# Patient Record
Sex: FEMALE | Race: WHITE | Employment: OTHER
[De-identification: names, ages, dates, MRNs, and addresses within clinical notes are randomized per-mention and may not be internally consistent; named-entity substitution may affect disease eponyms.]

---

## 2020-05-11 ENCOUNTER — NURSE TRIAGE (OUTPATIENT)
Dept: OTHER | Facility: CLINIC | Age: 65
End: 2020-05-11

## 2021-01-21 ENCOUNTER — NURSE TRIAGE (OUTPATIENT)
Dept: OTHER | Facility: CLINIC | Age: 66
End: 2021-01-21

## 2021-01-21 NOTE — TELEPHONE ENCOUNTER
cities for community spread in the [address]. Note: Travel becomes less relevant if there is widespread community transmission where the patient lives. no    Protocols used: CORONAVIRUS (COVID-19 ) EXPOSURE-ADULT-[place]    Brief description of triage: pt was exposed to covid positive co-worker who hadn't worn mask while working-- last exposure 2 days ago. 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 Triage indicates for patient to call PCP. Care advice provided, patient verbalizes understanding; denies any other questions or concerns; instructed to call back for any new or worsening symptoms. This triage is a result of a call to [address] [place] [address]. Please do not respond to the triage nurse through this encounter. Any subsequent communication should be directly with the patient.

## 2021-10-21 ENCOUNTER — NURSE TRIAGE (OUTPATIENT)
Dept: OTHER | Facility: CLINIC | Age: 66
End: 2021-10-21

## 2021-10-21 NOTE — TELEPHONE ENCOUNTER
Brief description of triage: is currently on vacation and not feeling good. Took a home covid test from [facility] and it showed positive. Asking if it is accurate. Does have a deep cough and is taking Mucinex. Also has a call in to her PCP. No known covid exposure and has been vaccinated. Triage indicates for patient to call back as needed. Care advice provided, patient verbalizes understanding; denies any other questions or concerns; instructed to call back for any new or worsening symptoms. This triage is a result of a call to [address] [place] [address]. Please do not respond to the triage nurse through this encounter. Any subsequent communication should be directly with the patient.         Reason for Disposition   General information question, no triage required and triager able to answer question    Protocols used: INFORMATION ONLY CALL - NO TRIAGE-ADULT-

## 2021-10-24 ENCOUNTER — NURSE TRIAGE (OUTPATIENT)
Dept: OTHER | Facility: CLINIC | Age: 66
End: 2021-10-24

## 2021-10-24 NOTE — TELEPHONE ENCOUNTER
taste, muscle pain, sore throat; new loss of smell or taste especially support the diagnosis of COVID-19)            Fatigue and cough, body aches a little bit    Protocols used: CORONAVIRUS (COVID-19) DIAGNOSED OR SUSPECTED-ADULT-    Brief description of triage: see above     Triage indicates for patient to see provider within the next 24 hrs for persistent cough with congestion and positive for COVID     Care advice provided, patient verbalizes understanding; denies any other questions or concerns; instructed to call back for any new or worsening symptoms. This triage is a result of a call to [address] [place] [address]. Please do not respond to the triage nurse through this encounter. Any subsequent communication should be directly with the patient.

## 2023-02-06 PROBLEM — E77.8 HYPOPROTEINEMIA (MULTI): Status: ACTIVE | Noted: 2023-02-06

## 2023-02-06 PROBLEM — R71.8 MICROCYTOSIS: Status: ACTIVE | Noted: 2023-02-06

## 2023-02-06 PROBLEM — R51.9 HEADACHE: Status: ACTIVE | Noted: 2023-02-06

## 2023-02-06 PROBLEM — F41.1 ANXIETY STATE: Status: ACTIVE | Noted: 2023-02-06

## 2023-02-06 PROBLEM — M19.071 PRIMARY OSTEOARTHRITIS OF BOTH ANKLES: Status: ACTIVE | Noted: 2023-02-06

## 2023-02-06 PROBLEM — G89.29 CHRONIC RIGHT FLANK PAIN: Status: ACTIVE | Noted: 2023-02-06

## 2023-02-06 PROBLEM — M65.331 TRIGGER MIDDLE FINGER OF RIGHT HAND: Status: ACTIVE | Noted: 2023-02-06

## 2023-02-06 PROBLEM — K52.9 CHRONIC DIARRHEA: Status: ACTIVE | Noted: 2023-02-06

## 2023-02-06 PROBLEM — M19.072 PRIMARY OSTEOARTHRITIS OF BOTH ANKLES: Status: ACTIVE | Noted: 2023-02-06

## 2023-02-06 PROBLEM — R14.0 ABDOMINAL BLOATING: Status: ACTIVE | Noted: 2023-02-06

## 2023-02-06 PROBLEM — R10.9 CHRONIC RIGHT FLANK PAIN: Status: ACTIVE | Noted: 2023-02-06

## 2023-02-06 PROBLEM — M25.641 STIFFNESS OF FINGER JOINT OF RIGHT HAND: Status: ACTIVE | Noted: 2023-02-06

## 2023-02-06 PROBLEM — Z90.49 S/P APPENDECTOMY: Status: ACTIVE | Noted: 2023-02-06

## 2023-02-06 PROBLEM — R20.0 NUMBNESS IN FEET: Status: ACTIVE | Noted: 2023-02-06

## 2023-02-06 PROBLEM — K44.9 HIATAL HERNIA: Status: ACTIVE | Noted: 2023-02-06

## 2023-02-06 PROBLEM — D50.9 IRON DEFICIENCY ANEMIA: Status: ACTIVE | Noted: 2023-02-06

## 2023-02-06 PROBLEM — K76.0 FATTY LIVER: Status: ACTIVE | Noted: 2023-02-06

## 2023-02-06 PROBLEM — K58.0 IRRITABLE BOWEL SYNDROME WITH DIARRHEA: Status: ACTIVE | Noted: 2023-02-06

## 2023-02-06 PROBLEM — I10 BENIGN ESSENTIAL HTN: Status: ACTIVE | Noted: 2023-02-06

## 2023-02-06 PROBLEM — G57.81 OTHER SPECIFIED MONONEUROPATHIES OF RIGHT LOWER LIMB: Status: ACTIVE | Noted: 2023-02-06

## 2023-02-06 PROBLEM — K29.50 CHRONIC GASTRITIS: Status: ACTIVE | Noted: 2023-02-06

## 2023-02-06 PROBLEM — Z86.16 HISTORY OF COVID-19: Status: ACTIVE | Noted: 2023-02-06

## 2023-02-06 PROBLEM — J45.909 ASTHMA (HHS-HCC): Status: ACTIVE | Noted: 2023-02-06

## 2023-02-06 PROBLEM — R60.0 LEG EDEMA: Status: ACTIVE | Noted: 2023-02-06

## 2023-02-06 PROBLEM — E04.1 THYROID NODULE: Status: ACTIVE | Noted: 2023-02-06

## 2023-02-06 PROBLEM — M79.671 PAIN IN BOTH FEET: Status: ACTIVE | Noted: 2023-02-06

## 2023-02-06 PROBLEM — K21.9 GERD (GASTROESOPHAGEAL REFLUX DISEASE): Status: ACTIVE | Noted: 2023-02-06

## 2023-02-06 PROBLEM — K92.1: Status: ACTIVE | Noted: 2023-02-06

## 2023-02-06 PROBLEM — Z98.84 BARIATRIC SURGERY STATUS: Status: ACTIVE | Noted: 2023-02-06

## 2023-02-06 PROBLEM — E74.39 GLUCOSE INTOLERANCE: Status: ACTIVE | Noted: 2023-02-06

## 2023-02-06 PROBLEM — D64.9 ANEMIA: Status: ACTIVE | Noted: 2023-02-06

## 2023-02-06 PROBLEM — R53.83 FATIGUE: Status: ACTIVE | Noted: 2023-02-06

## 2023-02-06 PROBLEM — Z86.39 H/O HYPOGLYCEMIA: Status: ACTIVE | Noted: 2023-02-06

## 2023-02-06 PROBLEM — H04.209 EYE TEARING: Status: ACTIVE | Noted: 2023-02-06

## 2023-02-06 PROBLEM — R63.4 WEIGHT LOSS: Status: ACTIVE | Noted: 2023-02-06

## 2023-02-06 PROBLEM — E78.5 HYPERLIPIDEMIA: Status: ACTIVE | Noted: 2023-02-06

## 2023-02-06 PROBLEM — M79.672 PAIN IN BOTH FEET: Status: ACTIVE | Noted: 2023-02-06

## 2023-02-06 RX ORDER — OMEPRAZOLE 40 MG/1
40 CAPSULE, DELAYED RELEASE ORAL
COMMUNITY
Start: 2021-08-03 | End: 2023-12-07 | Stop reason: SDUPTHER

## 2023-02-06 RX ORDER — FAMOTIDINE 20 MG/1
20 TABLET, FILM COATED ORAL 2 TIMES DAILY PRN
COMMUNITY
Start: 2018-09-05 | End: 2023-12-07 | Stop reason: SDUPTHER

## 2023-02-06 RX ORDER — BLOOD-GLUCOSE METER
EACH MISCELLANEOUS
COMMUNITY
Start: 2019-12-02

## 2023-02-06 RX ORDER — SUCRALFATE 1 G/1
1 TABLET ORAL
COMMUNITY
Start: 2022-09-01 | End: 2023-03-13

## 2023-02-06 RX ORDER — UBIDECARENONE 75 MG
500 CAPSULE ORAL DAILY
COMMUNITY

## 2023-02-06 RX ORDER — METOPROLOL TARTRATE 25 MG/1
25 TABLET, FILM COATED ORAL 2 TIMES DAILY
COMMUNITY
Start: 2018-02-16 | End: 2023-09-18

## 2023-02-06 RX ORDER — FEXOFENADINE HCL 60 MG
30 TABLET ORAL
COMMUNITY
End: 2023-03-13

## 2023-02-06 RX ORDER — MULTIVITAMIN
1 TABLET ORAL DAILY
COMMUNITY

## 2023-02-06 RX ORDER — OLOPATADINE HYDROCHLORIDE 2 MG/ML
1 SOLUTION/ DROPS OPHTHALMIC
COMMUNITY
Start: 2022-02-10 | End: 2023-10-05

## 2023-03-13 ENCOUNTER — OFFICE VISIT (OUTPATIENT)
Dept: PRIMARY CARE | Facility: CLINIC | Age: 68
End: 2023-03-13
Payer: MEDICARE

## 2023-03-13 VITALS
WEIGHT: 176 LBS | DIASTOLIC BLOOD PRESSURE: 76 MMHG | HEART RATE: 70 BPM | OXYGEN SATURATION: 97 % | HEIGHT: 63 IN | SYSTOLIC BLOOD PRESSURE: 118 MMHG | BODY MASS INDEX: 31.18 KG/M2

## 2023-03-13 DIAGNOSIS — R20.0 NUMBNESS AND TINGLING OF BOTH FEET: ICD-10-CM

## 2023-03-13 DIAGNOSIS — K92.1: ICD-10-CM

## 2023-03-13 DIAGNOSIS — R20.2 NUMBNESS AND TINGLING OF BOTH FEET: ICD-10-CM

## 2023-03-13 DIAGNOSIS — K59.00 CONSTIPATION, UNSPECIFIED CONSTIPATION TYPE: ICD-10-CM

## 2023-03-13 DIAGNOSIS — I10 BENIGN ESSENTIAL HTN: ICD-10-CM

## 2023-03-13 DIAGNOSIS — K29.50 OTHER CHRONIC GASTRITIS WITHOUT HEMORRHAGE: ICD-10-CM

## 2023-03-13 DIAGNOSIS — R14.0 ABDOMINAL BLOATING: Primary | ICD-10-CM

## 2023-03-13 DIAGNOSIS — K64.8 INTERNAL HEMORRHOID: ICD-10-CM

## 2023-03-13 DIAGNOSIS — K58.2 IRRITABLE BOWEL SYNDROME WITH BOTH CONSTIPATION AND DIARRHEA: ICD-10-CM

## 2023-03-13 PROBLEM — K52.9 CHRONIC DIARRHEA: Status: RESOLVED | Noted: 2023-02-06 | Resolved: 2023-03-13

## 2023-03-13 PROBLEM — E77.8 HYPOPROTEINEMIA (MULTI): Status: RESOLVED | Noted: 2023-02-06 | Resolved: 2023-03-13

## 2023-03-13 PROBLEM — K58.0 IRRITABLE BOWEL SYNDROME WITH DIARRHEA: Status: RESOLVED | Noted: 2023-02-06 | Resolved: 2023-03-13

## 2023-03-13 PROCEDURE — 99214 OFFICE O/P EST MOD 30 MIN: CPT | Performed by: INTERNAL MEDICINE

## 2023-03-13 PROCEDURE — 3074F SYST BP LT 130 MM HG: CPT | Performed by: INTERNAL MEDICINE

## 2023-03-13 PROCEDURE — 1159F MED LIST DOCD IN RCRD: CPT | Performed by: INTERNAL MEDICINE

## 2023-03-13 PROCEDURE — 1160F RVW MEDS BY RX/DR IN RCRD: CPT | Performed by: INTERNAL MEDICINE

## 2023-03-13 PROCEDURE — 3078F DIAST BP <80 MM HG: CPT | Performed by: INTERNAL MEDICINE

## 2023-03-13 PROCEDURE — 1036F TOBACCO NON-USER: CPT | Performed by: INTERNAL MEDICINE

## 2023-03-13 RX ORDER — ASPIRIN 81 MG/1
1 TABLET ORAL DAILY
COMMUNITY
End: 2023-03-13

## 2023-03-13 RX ORDER — DOCUSATE SODIUM 100 MG/1
100 CAPSULE, LIQUID FILLED ORAL 2 TIMES DAILY PRN
Qty: 60 CAPSULE | Refills: 5 | Status: SHIPPED
Start: 2023-03-13 | End: 2023-03-13 | Stop reason: SDUPTHER

## 2023-03-13 RX ORDER — TRIAMCINOLONE ACETONIDE 1 MG/G
CREAM TOPICAL 2 TIMES DAILY
COMMUNITY
Start: 2023-02-09 | End: 2023-10-05

## 2023-03-13 RX ORDER — DOCUSATE SODIUM 100 MG/1
100 CAPSULE, LIQUID FILLED ORAL ONCE
Status: SHIPPED | OUTPATIENT
Start: 2023-03-13

## 2023-03-13 RX ORDER — AMLODIPINE BESYLATE 5 MG/1
5 TABLET ORAL DAILY
COMMUNITY
Start: 2021-01-05 | End: 2023-12-07 | Stop reason: SDUPTHER

## 2023-03-13 RX ORDER — HYDROCORTISONE ACETATE 25 MG/1
25 SUPPOSITORY RECTAL 2 TIMES DAILY
COMMUNITY
Start: 2023-03-11 | End: 2023-10-05

## 2023-03-13 RX ORDER — MINERAL OIL
180 ENEMA (ML) RECTAL DAILY
COMMUNITY
End: 2023-12-07 | Stop reason: SDUPTHER

## 2023-03-13 ASSESSMENT — ENCOUNTER SYMPTOMS
ABDOMINAL DISTENTION: 0
MUSCULOSKELETAL NEGATIVE: 1
NAUSEA: 0
CHILLS: 0
DIZZINESS: 0
RHINORRHEA: 0
DIARRHEA: 0
LIGHT-HEADEDNESS: 0
PSYCHIATRIC NEGATIVE: 1
DYSURIA: 0
EYES NEGATIVE: 1
HEADACHES: 0
VOMITING: 0
BLOOD IN STOOL: 1
AGITATION: 0
WEAKNESS: 0
WHEEZING: 0
ENDOCRINE NEGATIVE: 1
FEVER: 0
NUMBNESS: 1
SHORTNESS OF BREATH: 0
CARDIOVASCULAR NEGATIVE: 1
CONSTIPATION: 1
COUGH: 0
ABDOMINAL PAIN: 0
BACK PAIN: 0
PALPITATIONS: 0

## 2023-03-13 ASSESSMENT — PATIENT HEALTH QUESTIONNAIRE - PHQ9
2. FEELING DOWN, DEPRESSED OR HOPELESS: NOT AT ALL
1. LITTLE INTEREST OR PLEASURE IN DOING THINGS: NOT AT ALL
SUM OF ALL RESPONSES TO PHQ9 QUESTIONS 1 AND 2: 0

## 2023-03-13 NOTE — PROGRESS NOTES
Subjective   Patient ID: Yolette Morrow is a 67 y.o. female who presents for Follow-up (ER FOLLOW UP FOR RECTAL BLEEDING (HAS INTERNAL HEMORRHOIDS) PT WAS SEEN AT [facility] IN [place]  HPI  F/U AFTER ER VISIT AT [place] (HAPPENED DURING TRIP) FOR ABDOMINAL PAIN AND ALECIA BRIGHT RED BLOOD IN STOOL, CONSTIPATION. HAD LABS AND CT OF ABDOMEN AND PELVIS DONE. CURRENTLY IS USING RECTAL STEROID WHICH HELPS WITH HAVING INTERNAL HEMORRHOID. STILL HAS BLOOD IN STOOL, BUT IS SPOTTING. ABDOMINAL PAIN IS RESOLVED. ABDOMINAL BLOATING. WORSENING CHRONIC TINGLING AND NUMBNESS IF FEET . S/P B/L CARPAL TUNNEL SURGERY AROUND 3 YEARS AGO.  Review of Systems   Constitutional:  Negative for chills and fever.   HENT: Negative.  Negative for congestion, postnasal drip and rhinorrhea.    Eyes: Negative.  Negative for visual disturbance.   Respiratory:  Negative for cough, shortness of breath and wheezing.    Cardiovascular: Negative.  Negative for chest pain, palpitations and leg swelling.   Gastrointestinal:  Positive for blood in stool and constipation. Negative for abdominal distention, abdominal pain, diarrhea, nausea and vomiting.   Endocrine: Negative.    Genitourinary:  Negative for dysuria and urgency.   Musculoskeletal: Negative.  Negative for back pain.   Skin: Negative.  Negative for rash.   Allergic/Immunologic: Negative for immunocompromised state.   Neurological:  Positive for numbness. Negative for dizziness, weakness, light-headedness and headaches.        TINGLING OF FEET   Psychiatric/Behavioral: Negative.  Negative for agitation.        Objective   Physical Exam  Constitutional:       General: She is not in acute distress.  HENT:      Head: Normocephalic.      Nose: Nose normal.      Mouth/Throat:      Mouth: Mucous membranes are moist.   Eyes:      Conjunctiva/sclera: Conjunctivae normal.      Pupils: Pupils are equal, round, and reactive to light.   Cardiovascular:      Rate and Rhythm: Normal rate  and regular rhythm.      Pulses: Normal pulses.      Heart sounds: Normal heart sounds.   Pulmonary:      Effort: No respiratory distress.      Breath sounds: No wheezing.   Chest:      Chest wall: No tenderness.   Abdominal:      General: Abdomen is flat. Bowel sounds are normal.      Palpations: Abdomen is soft.      Tenderness: There is no abdominal tenderness.   Musculoskeletal:         General: No tenderness. Normal range of motion.      Cervical back: Normal range of motion.   Lymphadenopathy:      Cervical: No cervical adenopathy.   Skin:     General: Skin is warm and dry.      Findings: No rash.   Neurological:      General: No focal deficit present.      Mental Status: She is alert. Mental status is at baseline.   Psychiatric:         Mood and Affect: Mood normal.         Behavior: Behavior normal.         Assessment/Plan      Problem List Items Addressed This Visit          Circulatory    Benign essential HTN       Digestive    Chronic gastritis       Other    Abdominal bloating - Primary    Blood in stool, mehul     Other Visit Diagnoses       Constipation, unspecified constipation type        Relevant Medications    docusate sodium (Colace) capsule 100 mg    docusate sodium (Colace) 100 mg capsule    Numbness and tingling of both feet        Relevant Orders    EMG & nerve conduction    Internal hemorrhoid        Irritable bowel syndrome with both constipation and diarrhea            ALL ASSESSED CHRONIC CONDITIONS ARE STABLE OR ADDRESSED.  TEST RESULTS WERE DISCUSSED.  Stop smoking and chewing tobacco.  Lose weight.  Do not overeat. Eat small portions at meals and snacks.  Avoid tight clothing and tight-fitting belts. Do not lie down or bend over within the first 15-30 minutes after eating.  Do not chew gum or suck on hard candy. Swallowing air with chewing gum and sucking on hard candy can cause belching and reflux.  Raise the head of your bed 6-8 inches.  Do not eat/drink: chocolate, tomatoes, tomato  sauces, oranges, pineapple, grapefruit, mints, coffee, alcohol, carbonated beverages, and black pepper.  Eat a low fat diet. Fatty and greasy foods cause your stomach to produce more acid.  ADVISED TO ADD MORE FIBER TO DIET.    MDM    1) COMPLEXITY: 1 OR MORE CHRONIC CONDITION WITH EXACERBATION, OR PROGRESSION OR SIDE EFFECT OF TREATMENT ADDRESSED  2)DATA: TESTS INTERPRETED AND OR ORDERED, TOOK INDEPENDENT HISTORY OR RECORDS REVIEWED  3)RISK: MODERATE RISK DUE TO NATURE OF MEDICAL CONDITIONS/COMORBIDITY OR MEDICATIONS ORDERED OR SURGICAL OR PROCEDURE REFERRAL, .

## 2023-03-14 RX ORDER — DOCUSATE SODIUM 100 MG/1
100 CAPSULE, LIQUID FILLED ORAL 2 TIMES DAILY PRN
Qty: 60 CAPSULE | Refills: 5 | Status: SHIPPED | OUTPATIENT
Start: 2023-03-14 | End: 2023-04-06 | Stop reason: ALTCHOICE

## 2023-03-30 ENCOUNTER — APPOINTMENT (OUTPATIENT)
Dept: PRIMARY CARE | Facility: CLINIC | Age: 68
End: 2023-03-30
Payer: MEDICARE

## 2023-04-06 ENCOUNTER — OFFICE VISIT (OUTPATIENT)
Dept: PRIMARY CARE | Facility: CLINIC | Age: 68
End: 2023-04-06
Payer: MEDICARE

## 2023-04-06 VITALS
OXYGEN SATURATION: 98 % | BODY MASS INDEX: 31.18 KG/M2 | WEIGHT: 176 LBS | SYSTOLIC BLOOD PRESSURE: 112 MMHG | HEART RATE: 73 BPM | HEIGHT: 63 IN | DIASTOLIC BLOOD PRESSURE: 68 MMHG

## 2023-04-06 DIAGNOSIS — R14.0 ABDOMINAL BLOATING: ICD-10-CM

## 2023-04-06 DIAGNOSIS — K29.50 CHRONIC GASTRITIS WITHOUT BLEEDING, UNSPECIFIED GASTRITIS TYPE: ICD-10-CM

## 2023-04-06 DIAGNOSIS — I10 BENIGN ESSENTIAL HTN: ICD-10-CM

## 2023-04-06 DIAGNOSIS — R53.82 CHRONIC FATIGUE: ICD-10-CM

## 2023-04-06 DIAGNOSIS — Z98.84 BARIATRIC SURGERY STATUS: ICD-10-CM

## 2023-04-06 DIAGNOSIS — K52.9 CHRONIC DIARRHEA: Primary | ICD-10-CM

## 2023-04-06 PROBLEM — H04.209 EYE TEARING: Status: RESOLVED | Noted: 2023-02-06 | Resolved: 2023-04-06

## 2023-04-06 PROBLEM — K92.1: Status: RESOLVED | Noted: 2023-02-06 | Resolved: 2023-04-06

## 2023-04-06 PROCEDURE — 1159F MED LIST DOCD IN RCRD: CPT | Performed by: INTERNAL MEDICINE

## 2023-04-06 PROCEDURE — 1160F RVW MEDS BY RX/DR IN RCRD: CPT | Performed by: INTERNAL MEDICINE

## 2023-04-06 PROCEDURE — 1036F TOBACCO NON-USER: CPT | Performed by: INTERNAL MEDICINE

## 2023-04-06 PROCEDURE — 3074F SYST BP LT 130 MM HG: CPT | Performed by: INTERNAL MEDICINE

## 2023-04-06 PROCEDURE — 3078F DIAST BP <80 MM HG: CPT | Performed by: INTERNAL MEDICINE

## 2023-04-06 PROCEDURE — 99214 OFFICE O/P EST MOD 30 MIN: CPT | Performed by: INTERNAL MEDICINE

## 2023-04-06 ASSESSMENT — ENCOUNTER SYMPTOMS
WHEEZING: 0
BACK PAIN: 0
FEVER: 0
CHILLS: 0
PSYCHIATRIC NEGATIVE: 1
SHORTNESS OF BREATH: 0
NEUROLOGICAL NEGATIVE: 1
ABDOMINAL PAIN: 0
DIARRHEA: 1
ENDOCRINE NEGATIVE: 1
ABDOMINAL DISTENTION: 0
MUSCULOSKELETAL NEGATIVE: 1
DYSURIA: 0
HEADACHES: 0
CARDIOVASCULAR NEGATIVE: 1
NAUSEA: 0
CONSTIPATION: 0
EYES NEGATIVE: 1
AGITATION: 0
DIZZINESS: 0
VOMITING: 0
RHINORRHEA: 0
WEAKNESS: 0
PALPITATIONS: 0
COUGH: 0
LIGHT-HEADEDNESS: 0

## 2023-04-06 NOTE — PROGRESS NOTES
Subjective   Patient ID: Yolette Morrow is a 67 y.o. female who presents for Follow-up (3 WEEK FOLLOW UP- PREDNISONE COMPLETE. STILL HAVING DIARRHEA AFTER EATING. SEES DIGESTIVE DR NEXT WEEK.).  HPI  CHRONIC DIARRHEA ,F/U ON HTN . ALTERNATIONS FROM NORMAL STOOL TO LIQUID) ON A DAILY BASIS. FATIGUE, ABDOMINAL BLOATING .HAS BEEN FOLLOWING DAIRY FREE ,GLUTEN FREE DIET. DENIES HAVING ABDOMINAL PAIN ,EXCEPT MILD PAIN 2/10 OF RUQ AND LUQ . PRN ANTICHOLINERGIC CLASS DIDN'T HELP. PENDING LABS FOR UPCOMING GI VISIT. PENDING FOOD ALLERGY TEST IN 1 MONTH.  Review of Systems   Constitutional:  Negative for chills and fever.   HENT: Negative.  Negative for congestion, postnasal drip and rhinorrhea.    Eyes: Negative.  Negative for visual disturbance.   Respiratory:  Negative for cough, shortness of breath and wheezing.    Cardiovascular: Negative.  Negative for chest pain, palpitations and leg swelling.   Gastrointestinal:  Positive for diarrhea. Negative for abdominal distention, abdominal pain, constipation, nausea and vomiting.   Endocrine: Negative.    Genitourinary:  Negative for dysuria and urgency.   Musculoskeletal: Negative.  Negative for back pain.   Skin: Negative.  Negative for rash.   Allergic/Immunologic: Negative for immunocompromised state.   Neurological: Negative.  Negative for dizziness, weakness, light-headedness and headaches.   Psychiatric/Behavioral: Negative.  Negative for agitation.        Objective   Physical Exam  Constitutional:       General: She is not in acute distress.  HENT:      Head: Normocephalic.      Nose: Nose normal.      Mouth/Throat:      Mouth: Mucous membranes are moist.   Eyes:      Conjunctiva/sclera: Conjunctivae normal.      Pupils: Pupils are equal, round, and reactive to light.   Cardiovascular:      Rate and Rhythm: Normal rate and regular rhythm.      Pulses: Normal pulses.      Heart sounds: Normal heart sounds.   Pulmonary:      Effort: No respiratory distress.      Breath  sounds: No wheezing.   Chest:      Chest wall: No tenderness.   Abdominal:      General: Abdomen is flat. Bowel sounds are normal.      Palpations: Abdomen is soft.      Tenderness: There is abdominal tenderness.      Comments: RUQ AND LUQ TENDERNESS (AT THE FAR CORNERS OF ABDOMEN, ON BOTH SIDES) .   Musculoskeletal:         General: No tenderness. Normal range of motion.      Cervical back: Normal range of motion.   Lymphadenopathy:      Cervical: No cervical adenopathy.   Skin:     General: Skin is warm and dry.      Findings: No rash.   Neurological:      General: No focal deficit present.      Mental Status: She is alert. Mental status is at baseline.   Psychiatric:         Mood and Affect: Mood normal.         Behavior: Behavior normal.         Assessment/Plan   Problem List Items Addressed This Visit          Circulatory    Benign essential HTN       Digestive    Bariatric surgery status    Chronic diarrhea - Primary    Chronic gastritis       Other    Abdominal bloating     ADVISED FOR SMALLER MEAL PORTIONS MORE FREQUENT SERVINGS AND TO HAVE MORE GATORADE ,AND TO HAVE LOW FAT DIET. CUT BACK ON CAFFEINE.  Lose weight.  Do not overeat. Eat small portions at meals and snacks.  Avoid tight clothing and tight-fitting belts. Do not lie down or bend over within the first 15-30 minutes after eating.  Do not chew gum or suck on hard candy. Swallowing air with chewing gum and sucking on hard candy can cause belching and reflux.  Raise the head of your bed 6-8 inches.  Do not eat/drink: chocolate, tomatoes, tomato sauces, oranges, pineapple, grapefruit, mints, coffee, alcohol, carbonated beverages, and black pepper.  Eat a low fat diet. Fatty and greasy foods cause your stomach to produce more acid.    MDM    1) COMPLEXITY: MORE THAN 1 STABLE CHRONIC CONDITION ADDRESSED  2)DATA: TESTS INTERPRETED AND OR ORDERED, TOOK INDEPENDENT HISTORY OR RECORDS REVIEWED  3)RISK: MODERATE RISK DUE TO NATURE OF MEDICAL  CONDITIONS/COMORBIDITY OR MEDICATIONS ORDERED OR SURGICAL OR PROCEDURE REFERRAL, .       3 weeks

## 2023-04-07 LAB
CALCIDIOL (25 OH VITAMIN D3) (NG/ML) IN SER/PLAS: 39 NG/ML
COBALAMIN (VITAMIN B12) (PG/ML) IN SER/PLAS: 3044 PG/ML (ref 211–911)
FOLATE (NG/ML) IN SER/PLAS: >22.3 NG/ML
PARATHYRIN INTACT (PG/ML) IN SER/PLAS: 51 PG/ML (ref 18.5–88)

## 2023-04-12 LAB — VITAMIN B1, WHOLE BLOOD: 118 NMOL/L (ref 70–180)

## 2023-04-13 LAB
ALANINE AMINOTRANSFERASE (SGPT) (U/L) IN SER/PLAS: 14 U/L (ref 7–45)
ALBUMIN (G/DL) IN SER/PLAS: 4.4 G/DL (ref 3.4–5)
ALKALINE PHOSPHATASE (U/L) IN SER/PLAS: 68 U/L (ref 33–136)
ANION GAP IN SER/PLAS: 11 MMOL/L (ref 10–20)
ASPARTATE AMINOTRANSFERASE (SGOT) (U/L) IN SER/PLAS: 20 U/L (ref 9–39)
BASOPHILS (10*3/UL) IN BLOOD BY AUTOMATED COUNT: 0.07 X10E9/L (ref 0–0.1)
BASOPHILS/100 LEUKOCYTES IN BLOOD BY AUTOMATED COUNT: 1 % (ref 0–2)
BILIRUBIN TOTAL (MG/DL) IN SER/PLAS: 0.5 MG/DL (ref 0–1.2)
CALCIDIOL (25 OH VITAMIN D3) (NG/ML) IN SER/PLAS: 39 NG/ML
CALCIUM (MG/DL) IN SER/PLAS: 9.3 MG/DL (ref 8.6–10.3)
CARBON DIOXIDE, TOTAL (MMOL/L) IN SER/PLAS: 26 MMOL/L (ref 21–32)
CHLORIDE (MMOL/L) IN SER/PLAS: 105 MMOL/L (ref 98–107)
CHOLESTEROL (MG/DL) IN SER/PLAS: 188 MG/DL (ref 0–199)
CHOLESTEROL IN HDL (MG/DL) IN SER/PLAS: 47 MG/DL
CHOLESTEROL/HDL RATIO: 4
COBALAMIN (VITAMIN B12) (PG/ML) IN SER/PLAS: 2740 PG/ML (ref 211–911)
CREATININE (MG/DL) IN SER/PLAS: 0.65 MG/DL (ref 0.5–1.05)
EOSINOPHILS (10*3/UL) IN BLOOD BY AUTOMATED COUNT: 0.19 X10E9/L (ref 0–0.7)
EOSINOPHILS/100 LEUKOCYTES IN BLOOD BY AUTOMATED COUNT: 2.7 % (ref 0–6)
ERYTHROCYTE DISTRIBUTION WIDTH (RATIO) BY AUTOMATED COUNT: 12.9 % (ref 11.5–14.5)
ERYTHROCYTE MEAN CORPUSCULAR HEMOGLOBIN CONCENTRATION (G/DL) BY AUTOMATED: 32.7 G/DL (ref 32–36)
ERYTHROCYTE MEAN CORPUSCULAR VOLUME (FL) BY AUTOMATED COUNT: 91 FL (ref 80–100)
ERYTHROCYTES (10*6/UL) IN BLOOD BY AUTOMATED COUNT: 4.53 X10E12/L (ref 4–5.2)
ESTIMATED AVERAGE GLUCOSE FOR HBA1C: 120 MG/DL
FERRITIN (UG/LL) IN SER/PLAS: 21 UG/L (ref 8–150)
FOLATE (NG/ML) IN SER/PLAS: >22.3 NG/ML
GFR FEMALE: >90 ML/MIN/1.73M2
GLUCOSE (MG/DL) IN SER/PLAS: 83 MG/DL (ref 74–99)
HEMATOCRIT (%) IN BLOOD BY AUTOMATED COUNT: 41.3 % (ref 36–46)
HEMOGLOBIN (G/DL) IN BLOOD: 13.5 G/DL (ref 12–16)
HEMOGLOBIN A1C/HEMOGLOBIN TOTAL IN BLOOD: 5.8 %
IMMATURE GRANULOCYTES/100 LEUKOCYTES IN BLOOD BY AUTOMATED COUNT: 0.6 % (ref 0–0.9)
IRON (UG/DL) IN SER/PLAS: 120 UG/DL (ref 35–150)
IRON BINDING CAPACITY (UG/DL) IN SER/PLAS: 380 UG/DL (ref 240–445)
IRON SATURATION (%) IN SER/PLAS: 32 % (ref 25–45)
LDL: 110 MG/DL (ref 0–99)
LEUKOCYTES (10*3/UL) IN BLOOD BY AUTOMATED COUNT: 7 X10E9/L (ref 4.4–11.3)
LYMPHOCYTES (10*3/UL) IN BLOOD BY AUTOMATED COUNT: 2.61 X10E9/L (ref 1.2–4.8)
LYMPHOCYTES/100 LEUKOCYTES IN BLOOD BY AUTOMATED COUNT: 37.3 % (ref 13–44)
MONOCYTES (10*3/UL) IN BLOOD BY AUTOMATED COUNT: 0.7 X10E9/L (ref 0.1–1)
MONOCYTES/100 LEUKOCYTES IN BLOOD BY AUTOMATED COUNT: 10 % (ref 2–10)
NEUTROPHILS (10*3/UL) IN BLOOD BY AUTOMATED COUNT: 3.38 X10E9/L (ref 1.2–7.7)
NEUTROPHILS/100 LEUKOCYTES IN BLOOD BY AUTOMATED COUNT: 48.4 % (ref 40–80)
PLATELETS (10*3/UL) IN BLOOD AUTOMATED COUNT: 257 X10E9/L (ref 150–450)
POTASSIUM (MMOL/L) IN SER/PLAS: 3.9 MMOL/L (ref 3.5–5.3)
PROTEIN TOTAL: 6.9 G/DL (ref 6.4–8.2)
SODIUM (MMOL/L) IN SER/PLAS: 138 MMOL/L (ref 136–145)
TRIGLYCERIDE (MG/DL) IN SER/PLAS: 157 MG/DL (ref 0–149)
UREA NITROGEN (MG/DL) IN SER/PLAS: 16 MG/DL (ref 6–23)
VLDL: 31 MG/DL (ref 0–40)

## 2023-04-14 LAB
ALLERGEN FOOD: CLAM (RUDITAPES SPP.) IGE (KU/L): <0.1 KU/L
ALLERGEN FOOD: EGG WHITE IGE (KU/L): <0.1 KU/L
ALLERGEN FOOD: FISH (COD) GADUS MORHUA) IGE (KU/L): <0.1 KU/L
ALLERGEN FOOD: MAIZE, CORN (ZEA MAYS) IGE (KU/L): <0.1 KU/L
ALLERGEN FOOD: MILK IGE (KU/L): <0.1 KU/L
ALLERGEN FOOD: PEANUT (ARACHIS HYPOGAEA) IGE (KU/L): <0.1 KU/L
ALLERGEN FOOD: SCALLOP (PECTEN SPP.) IGE (KU/L): <0.1 KU/L
ALLERGEN FOOD: SESAME SEED (SESAMUM INDICUM) IGE (KU/L): <0.1 KU/L
ALLERGEN FOOD: SHRIMP (P. BOREALIS/MONODON, M. BARBATA/JOYNERI) IGE (KU/L): <0.1 KU/L
ALLERGEN FOOD: SOYBEAN (GLYCINE MAX) IGE (KU/L): <0.1 KU/L
ALLERGEN FOOD: WALNUT (JUGLANS SPP.) IGE (KU/L): <0.1 KU/L
ALLERGEN FOOD: WHEAT (TRITICUM AESTIVUM) IGE (KU/L): <0.1 KU/L
IMMUNOCAP INTERPRETATION: NORMAL
PARATHYRIN INTACT (PG/ML) IN SER/PLAS: 38.7 PG/ML (ref 18.5–88)

## 2023-04-17 LAB — COPPER: 145.7 UG/DL (ref 80–155)

## 2023-04-18 LAB — VITAMIN B1, WHOLE BLOOD: 136 NMOL/L (ref 70–180)

## 2023-04-20 ENCOUNTER — APPOINTMENT (OUTPATIENT)
Dept: PRIMARY CARE | Facility: CLINIC | Age: 68
End: 2023-04-20
Payer: MEDICARE

## 2023-04-24 ENCOUNTER — PATIENT OUTREACH (OUTPATIENT)
Dept: CARE COORDINATION | Facility: CLINIC | Age: 68
End: 2023-04-24
Payer: MEDICARE

## 2023-04-24 ENCOUNTER — DOCUMENTATION (OUTPATIENT)
Dept: PRIMARY CARE | Facility: CLINIC | Age: 68
End: 2023-04-24
Payer: MEDICARE

## 2023-04-24 RX ORDER — DOCUSATE SODIUM 100 MG/1
100 CAPSULE, LIQUID FILLED ORAL DAILY
COMMUNITY
End: 2023-10-05

## 2023-04-24 RX ORDER — OXYCODONE HYDROCHLORIDE 5 MG/1
5 CAPSULE ORAL EVERY 4 HOURS PRN
COMMUNITY
End: 2023-10-05

## 2023-04-24 NOTE — PROGRESS NOTES
Discharge Facility: [facility]  Discharge Diagnosis: Chronic cholecysitis  Admission Date: 4/20/2023  Discharge Date: 4/21/2023    PCP Appointment Date: Pt states she has scheduled within 2 weeks, unable to verify in book it. Will reach out to office pool to follow up.  Specialist Appointment Date: Not scheduled at this time  Hospital Encounter and Summary: Linked   See discharge assessment below for further details    Engagement  Call Start Time: 1012 (4/24/2023 10:13 AM)    Medications  Medications reviewed with patient/caregiver?: Yes (new prescriptions reviewed) (4/24/2023 10:13 AM)  Is the patient having any side effects they believe may be caused by any medication additions or changes?: No (pt did report some constipation, pt states she has issues with constipation to begin with so she did skip some pain medication doses and took ducolax and was able to have a BM.) (4/24/2023 10:13 AM)  Does the patient have all medications ordered at discharge?: Yes (4/24/2023 10:13 AM)  Care Management Interventions: No intervention needed (4/24/2023 10:13 AM)  Is the patient taking all medications as directed (includes completed medication regime)?: Yes (4/24/2023 10:13 AM)  Care Management Interventions: Provided patient education (4/24/2023 10:13 AM)    Appointments  Does the patient have a primary care provider?: Yes (4/24/2023 10:13 AM)  Care Management Interventions: Educated patient on importance of making appointment (4/24/2023 10:13 AM)  Has the patient kept scheduled appointments due by today?: Yes (4/24/2023 10:13 AM)  Care Management Interventions: Advised patient to keep appointment (pt states she already has appt scheduled within 2 weeks, unable to verify in book it will follow up with office pool) (4/24/2023 10:13 AM)    Self Management  Has home health visited the patient within 72 hours of discharge?: Not applicable (4/24/2023 10:13 AM)    Patient Teaching  Does the patient have access to their discharge  instructions?: Yes (4/24/2023 10:13 AM)  What is the patient's perception of their health status since discharge?: Improving (4/24/2023 10:13 AM)  Is the patient/caregiver able to teach back the hierarchy of who to call/visit for symptoms/problems? PCP, Specialist, Home Health nurse, Urgent Care, ED, 911: Yes (4/24/2023 10:13 AM)    Wrap Up  Call End Time: 1020 (4/24/2023 10:13 AM)

## 2023-04-27 ENCOUNTER — APPOINTMENT (OUTPATIENT)
Dept: PRIMARY CARE | Facility: CLINIC | Age: 68
End: 2023-04-27
Payer: MEDICARE

## 2023-05-04 ENCOUNTER — PATIENT OUTREACH (OUTPATIENT)
Dept: CARE COORDINATION | Facility: CLINIC | Age: 68
End: 2023-05-04
Payer: MEDICARE

## 2023-05-04 NOTE — PROGRESS NOTES
14 day call back complete.  At time of outreach call the patient feels as if their condition has improved since last visit.  Verified appt with pcp 5/11 0815. Virtual Wednesday Dr. Singh.   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 Pt reports taking tylenol when needed. Pt states she has been sticking with soft food and is having a better time eating. Pt does report some diarrhea. Pt reports no needs or concerns at this time.

## 2023-05-11 ENCOUNTER — OFFICE VISIT (OUTPATIENT)
Dept: PRIMARY CARE | Facility: CLINIC | Age: 68
End: 2023-05-11
Payer: MEDICARE

## 2023-05-11 VITALS
WEIGHT: 171 LBS | DIASTOLIC BLOOD PRESSURE: 80 MMHG | HEART RATE: 64 BPM | BODY MASS INDEX: 30.3 KG/M2 | HEIGHT: 63 IN | SYSTOLIC BLOOD PRESSURE: 122 MMHG

## 2023-05-11 DIAGNOSIS — Z90.49 S/P LAPAROSCOPIC CHOLECYSTECTOMY: ICD-10-CM

## 2023-05-11 DIAGNOSIS — R68.89 COLD SENSITIVITY: ICD-10-CM

## 2023-05-11 DIAGNOSIS — R73.01 IMPAIRED FASTING GLUCOSE: ICD-10-CM

## 2023-05-11 DIAGNOSIS — I10 BENIGN ESSENTIAL HTN: Primary | ICD-10-CM

## 2023-05-11 PROBLEM — R14.0 ABDOMINAL BLOATING: Status: RESOLVED | Noted: 2023-02-06 | Resolved: 2023-05-11

## 2023-05-11 PROCEDURE — 99214 OFFICE O/P EST MOD 30 MIN: CPT | Performed by: INTERNAL MEDICINE

## 2023-05-11 PROCEDURE — 3079F DIAST BP 80-89 MM HG: CPT | Performed by: INTERNAL MEDICINE

## 2023-05-11 PROCEDURE — 1159F MED LIST DOCD IN RCRD: CPT | Performed by: INTERNAL MEDICINE

## 2023-05-11 PROCEDURE — 1160F RVW MEDS BY RX/DR IN RCRD: CPT | Performed by: INTERNAL MEDICINE

## 2023-05-11 PROCEDURE — 3008F BODY MASS INDEX DOCD: CPT | Performed by: INTERNAL MEDICINE

## 2023-05-11 PROCEDURE — 3074F SYST BP LT 130 MM HG: CPT | Performed by: INTERNAL MEDICINE

## 2023-05-11 PROCEDURE — 1036F TOBACCO NON-USER: CPT | Performed by: INTERNAL MEDICINE

## 2023-05-11 ASSESSMENT — ENCOUNTER SYMPTOMS
HEADACHES: 0
WHEEZING: 0
CARDIOVASCULAR NEGATIVE: 1
DIZZINESS: 0
VOMITING: 0
FEVER: 0
NEUROLOGICAL NEGATIVE: 1
NAUSEA: 0
DIARRHEA: 1
BACK PAIN: 0
LIGHT-HEADEDNESS: 0
RHINORRHEA: 0
SHORTNESS OF BREATH: 0
CONSTIPATION: 0
COUGH: 0
PALPITATIONS: 0
WEAKNESS: 0
CHILLS: 0
DYSURIA: 0
AGITATION: 0
MUSCULOSKELETAL NEGATIVE: 1
EYES NEGATIVE: 1
ABDOMINAL DISTENTION: 0
PSYCHIATRIC NEGATIVE: 1
ABDOMINAL PAIN: 0

## 2023-05-11 NOTE — PROGRESS NOTES
Subjective   Patient ID: Yolette Morrow is a 67 y.o. female who presents for Follow-up (3 WEEK F/U. PATIENT HAD CHOLECYSTECTOMY 4/20/23. STILL HAS).  HPI  F/U ON ABDOMINAL / BACK PAIN which IS RESOLVED AFTER CHOLECYSTECTOMY 2 WEEKS AGO. STILL HAS DIARRHEA ON AND OFF ,BUT TOLERATES THE FOOD BETTER. WORSENING CHRONIC COLD INTOLERANCE .  Review of Systems   Constitutional:  Negative for chills and fever.   HENT: Negative.  Negative for congestion, postnasal drip and rhinorrhea.    Eyes: Negative.  Negative for visual disturbance.   Respiratory:  Negative for cough, shortness of breath and wheezing.    Cardiovascular: Negative.  Negative for chest pain, palpitations and leg swelling.   Gastrointestinal:  Positive for diarrhea. Negative for abdominal distention, abdominal pain, constipation, nausea and vomiting.   Endocrine: Positive for cold intolerance.   Genitourinary:  Negative for dysuria and urgency.   Musculoskeletal: Negative.  Negative for back pain.   Skin: Negative.  Negative for rash.   Allergic/Immunologic: Negative for immunocompromised state.   Neurological: Negative.  Negative for dizziness, weakness, light-headedness and headaches.   Psychiatric/Behavioral: Negative.  Negative for agitation.        Objective   Physical Exam  Constitutional:       General: She is not in acute distress.  HENT:      Head: Normocephalic.      Nose: Nose normal.      Mouth/Throat:      Mouth: Mucous membranes are moist.   Eyes:      Conjunctiva/sclera: Conjunctivae normal.      Pupils: Pupils are equal, round, and reactive to light.   Cardiovascular:      Rate and Rhythm: Normal rate and regular rhythm.      Pulses: Normal pulses.      Heart sounds: Normal heart sounds.   Pulmonary:      Effort: No respiratory distress.      Breath sounds: No wheezing.   Chest:      Chest wall: No tenderness.   Abdominal:      General: Abdomen is flat. Bowel sounds are normal.      Palpations: Abdomen is soft.      Tenderness: There is no  abdominal tenderness.      Comments: HEALED SURGICAL SCARS OF ABDOMEN.   Musculoskeletal:         General: No tenderness. Normal range of motion.      Cervical back: Normal range of motion.   Lymphadenopathy:      Cervical: No cervical adenopathy.   Skin:     General: Skin is warm and dry.      Findings: No rash.   Neurological:      General: No focal deficit present.      Mental Status: She is alert. Mental status is at baseline.   Psychiatric:         Mood and Affect: Mood normal.         Behavior: Behavior normal.         Assessment/Plan   1. Benign essential HTN  Comprehensive Metabolic Panel      2. Cold sensitivity  TSH with reflex to Free T4 if abnormal    Magnesium      3. Impaired fasting glucose  Hemoglobin A1C      4. S/P laparoscopic cholecystectomy        ADVISED TO HAVE LOW FAT AND LOW CALORIE DIET AND TO LOOSE WEIGHT, DAILY EXERCISE.  ADVISED TO TAKE OTC MULTIVITAMIN DAILY AND TO DRESS UP IN LAYERS.  MDM    1) COMPLEXITY: 1 UNDIAGNOSED NEW PROBLEM WITH UNCERTAIN PROGNOSIS  2)DATA: TESTS INTERPRETED AND OR ORDERED, TOOK INDEPENDENT HISTORY OR RECORDS REVIEWED  3)RISK: MODERATE RISK DUE TO NATURE OF MEDICAL CONDITIONS/COMORBIDITY OR MEDICATIONS ORDERED OR SURGICAL OR PROCEDURE REFERRAL, .       4 MON

## 2023-06-05 ENCOUNTER — PATIENT OUTREACH (OUTPATIENT)
Dept: CARE COORDINATION | Facility: CLINIC | Age: 68
End: 2023-06-05
Payer: MEDICARE

## 2023-07-07 ENCOUNTER — PATIENT OUTREACH (OUTPATIENT)
Dept: CARE COORDINATION | Facility: CLINIC | Age: 68
End: 2023-07-07
Payer: MEDICARE

## 2023-07-07 NOTE — PROGRESS NOTES
Unable to reach patient for 60 day post discharge follow up call.   Left voicemail with call back number for patient to call if needed   If no voicemail available call attempts x 2 were made to contact the patient to assist with any questions or concerns patient may have.

## 2023-09-18 DIAGNOSIS — I10 BENIGN ESSENTIAL HTN: Primary | ICD-10-CM

## 2023-09-18 RX ORDER — METOPROLOL TARTRATE 25 MG/1
25 TABLET, FILM COATED ORAL 2 TIMES DAILY
Qty: 180 TABLET | Refills: 3 | Status: SHIPPED | OUTPATIENT
Start: 2023-09-18

## 2023-09-28 ENCOUNTER — LAB (OUTPATIENT)
Dept: LAB | Facility: LAB | Age: 68
End: 2023-09-28
Payer: MEDICARE

## 2023-09-28 DIAGNOSIS — R68.89 COLD SENSITIVITY: ICD-10-CM

## 2023-09-28 DIAGNOSIS — R73.01 IMPAIRED FASTING GLUCOSE: ICD-10-CM

## 2023-09-28 DIAGNOSIS — I10 BENIGN ESSENTIAL HTN: ICD-10-CM

## 2023-09-28 LAB
ALANINE AMINOTRANSFERASE (SGPT) (U/L) IN SER/PLAS: 20 U/L (ref 7–45)
ALBUMIN (G/DL) IN SER/PLAS: 4.4 G/DL (ref 3.4–5)
ALKALINE PHOSPHATASE (U/L) IN SER/PLAS: 70 U/L (ref 33–136)
ANION GAP IN SER/PLAS: 12 MMOL/L (ref 10–20)
ASPARTATE AMINOTRANSFERASE (SGOT) (U/L) IN SER/PLAS: 24 U/L (ref 9–39)
BILIRUBIN TOTAL (MG/DL) IN SER/PLAS: 0.5 MG/DL (ref 0–1.2)
CALCIUM (MG/DL) IN SER/PLAS: 9.5 MG/DL (ref 8.6–10.3)
CARBON DIOXIDE, TOTAL (MMOL/L) IN SER/PLAS: 28 MMOL/L (ref 21–32)
CHLORIDE (MMOL/L) IN SER/PLAS: 105 MMOL/L (ref 98–107)
CREATININE (MG/DL) IN SER/PLAS: 0.68 MG/DL (ref 0.5–1.05)
ESTIMATED AVERAGE GLUCOSE FOR HBA1C: 120 MG/DL
GFR FEMALE: >90 ML/MIN/1.73M2
GLUCOSE (MG/DL) IN SER/PLAS: 90 MG/DL (ref 74–99)
HEMOGLOBIN A1C/HEMOGLOBIN TOTAL IN BLOOD: 5.8 %
MAGNESIUM (MG/DL) IN SER/PLAS: 2.04 MG/DL (ref 1.6–2.4)
POTASSIUM (MMOL/L) IN SER/PLAS: 4.6 MMOL/L (ref 3.5–5.3)
PROTEIN TOTAL: 6.8 G/DL (ref 6.4–8.2)
SODIUM (MMOL/L) IN SER/PLAS: 140 MMOL/L (ref 136–145)
THYROTROPIN (MIU/L) IN SER/PLAS BY DETECTION LIMIT <= 0.05 MIU/L: 0.94 MIU/L (ref 0.44–3.98)
UREA NITROGEN (MG/DL) IN SER/PLAS: 12 MG/DL (ref 6–23)

## 2023-09-28 PROCEDURE — 84443 ASSAY THYROID STIM HORMONE: CPT

## 2023-09-28 PROCEDURE — 83036 HEMOGLOBIN GLYCOSYLATED A1C: CPT

## 2023-09-28 PROCEDURE — 36415 COLL VENOUS BLD VENIPUNCTURE: CPT

## 2023-09-28 PROCEDURE — 83735 ASSAY OF MAGNESIUM: CPT

## 2023-09-28 PROCEDURE — 80053 COMPREHEN METABOLIC PANEL: CPT

## 2023-10-05 ENCOUNTER — OFFICE VISIT (OUTPATIENT)
Dept: PRIMARY CARE | Facility: CLINIC | Age: 68
End: 2023-10-05
Payer: MEDICARE

## 2023-10-05 VITALS
HEART RATE: 66 BPM | OXYGEN SATURATION: 97 % | WEIGHT: 166 LBS | HEIGHT: 63 IN | SYSTOLIC BLOOD PRESSURE: 120 MMHG | DIASTOLIC BLOOD PRESSURE: 80 MMHG | BODY MASS INDEX: 29.41 KG/M2

## 2023-10-05 DIAGNOSIS — Z00.00 ROUTINE GENERAL MEDICAL EXAMINATION AT HEALTH CARE FACILITY: Primary | ICD-10-CM

## 2023-10-05 DIAGNOSIS — I10 BENIGN ESSENTIAL HTN: ICD-10-CM

## 2023-10-05 DIAGNOSIS — E74.39 GLUCOSE INTOLERANCE: ICD-10-CM

## 2023-10-05 DIAGNOSIS — K52.9 CHRONIC DIARRHEA: ICD-10-CM

## 2023-10-05 DIAGNOSIS — Z12.31 ENCOUNTER FOR SCREENING MAMMOGRAM FOR BREAST CANCER: ICD-10-CM

## 2023-10-05 DIAGNOSIS — Z87.828 HISTORY OF TRAUMA: ICD-10-CM

## 2023-10-05 DIAGNOSIS — M25.522 LEFT ELBOW PAIN: ICD-10-CM

## 2023-10-05 DIAGNOSIS — Z78.0 POSTMENOPAUSAL: ICD-10-CM

## 2023-10-05 DIAGNOSIS — Z90.49 S/P LAPAROSCOPIC CHOLECYSTECTOMY: ICD-10-CM

## 2023-10-05 PROBLEM — F41.1 ANXIETY STATE: Status: RESOLVED | Noted: 2023-02-06 | Resolved: 2023-10-05

## 2023-10-05 PROCEDURE — 1159F MED LIST DOCD IN RCRD: CPT | Performed by: INTERNAL MEDICINE

## 2023-10-05 PROCEDURE — 1160F RVW MEDS BY RX/DR IN RCRD: CPT | Performed by: INTERNAL MEDICINE

## 2023-10-05 PROCEDURE — 3079F DIAST BP 80-89 MM HG: CPT | Performed by: INTERNAL MEDICINE

## 2023-10-05 PROCEDURE — 3008F BODY MASS INDEX DOCD: CPT | Performed by: INTERNAL MEDICINE

## 2023-10-05 PROCEDURE — 1170F FXNL STATUS ASSESSED: CPT | Performed by: INTERNAL MEDICINE

## 2023-10-05 PROCEDURE — G0439 PPPS, SUBSEQ VISIT: HCPCS | Performed by: INTERNAL MEDICINE

## 2023-10-05 PROCEDURE — 99497 ADVNCD CARE PLAN 30 MIN: CPT | Performed by: INTERNAL MEDICINE

## 2023-10-05 PROCEDURE — 99214 OFFICE O/P EST MOD 30 MIN: CPT | Performed by: INTERNAL MEDICINE

## 2023-10-05 PROCEDURE — 1036F TOBACCO NON-USER: CPT | Performed by: INTERNAL MEDICINE

## 2023-10-05 PROCEDURE — 3074F SYST BP LT 130 MM HG: CPT | Performed by: INTERNAL MEDICINE

## 2023-10-05 RX ORDER — GABAPENTIN 100 MG/1
200 CAPSULE ORAL NIGHTLY
COMMUNITY

## 2023-10-05 RX ORDER — CHOLESTYRAMINE 4 G/4.8G
1 POWDER, FOR SUSPENSION ORAL
COMMUNITY
End: 2023-12-07 | Stop reason: SDUPTHER

## 2023-10-05 ASSESSMENT — ENCOUNTER SYMPTOMS
COUGH: 0
NAUSEA: 0
ARTHRALGIAS: 1
DIZZINESS: 0
NEUROLOGICAL NEGATIVE: 1
CHILLS: 0
VOMITING: 0
DIARRHEA: 1
AGITATION: 0
LIGHT-HEADEDNESS: 0
CONSTIPATION: 0
ENDOCRINE NEGATIVE: 1
FEVER: 0
PSYCHIATRIC NEGATIVE: 1
HEADACHES: 0
CARDIOVASCULAR NEGATIVE: 1
LOSS OF SENSATION IN FEET: 0
EYES NEGATIVE: 1
OCCASIONAL FEELINGS OF UNSTEADINESS: 0
WHEEZING: 0
PALPITATIONS: 0
BACK PAIN: 0
ABDOMINAL DISTENTION: 0
WEAKNESS: 0
SHORTNESS OF BREATH: 0
ABDOMINAL PAIN: 0
RHINORRHEA: 0
DYSURIA: 0

## 2023-10-05 ASSESSMENT — PATIENT HEALTH QUESTIONNAIRE - PHQ9
SUM OF ALL RESPONSES TO PHQ9 QUESTIONS 1 AND 2: 0
SUM OF ALL RESPONSES TO PHQ9 QUESTIONS 1 AND 2: 0
1. LITTLE INTEREST OR PLEASURE IN DOING THINGS: NOT AT ALL
2. FEELING DOWN, DEPRESSED OR HOPELESS: NOT AT ALL
1. LITTLE INTEREST OR PLEASURE IN DOING THINGS: NOT AT ALL
2. FEELING DOWN, DEPRESSED OR HOPELESS: NOT AT ALL

## 2023-10-05 ASSESSMENT — ACTIVITIES OF DAILY LIVING (ADL)
TAKING_MEDICATION: INDEPENDENT
GROCERY_SHOPPING: INDEPENDENT
MANAGING_FINANCES: INDEPENDENT
DOING_HOUSEWORK: INDEPENDENT
BATHING: INDEPENDENT
DRESSING: INDEPENDENT

## 2023-10-05 NOTE — PROGRESS NOTES
"Subjective   Reason for Visit: Yolette Morrow is an 68 y.o. female here for a Medicare Wellness visit.     Past Medical, Surgical, and Family History reviewed and updated in chart.    Reviewed all medications by prescribing practitioner or clinical pharmacist (such as prescriptions, OTCs, herbal therapies and supplements) and documented in the medical record.    HPI  LAB F/U. S/P ACCIDENTAL FALL 2 MONTHS AGO (SLIPPED ON WET FLOOR ) WITH TRAUMA TO L ELBOW.  HAS BEEN HAVING MILD L ELBOW PAIN 2-3/10 ON AND OFF SINCE THEN . NO CHANGE IN RANGE OF MOTION. CHRONIC DIARRHEA ,S/P CHOLECYSTECTOMY. CHOLESTYRAMINE GIVEN BY GI HELPS BUT PT STOPPED TAKING IT (CAUSES BLOATING). MEDICARE WELLNESS EXAM.  Patient Care Team:  Renee Oh MD as PCP - General  Renee Oh [facility] P[facility]ributed [facility]     Review of Systems   Constitutional:  Negative for chills and fever.   HENT: Negative.  Negative for congestion, postnasal drip and rhinorrhea.    Eyes: Negative.  Negative for visual disturbance.   Respiratory:  Negative for cough, shortness of breath and wheezing.    Cardiovascular: Negative.  Negative for chest pain, palpitations and leg swelling.   Gastrointestinal:  Positive for diarrhea. Negative for abdominal distention, abdominal pain, constipation, nausea and vomiting.   Endocrine: Negative.    Genitourinary:  Negative for dysuria and urgency.   Musculoskeletal:  Positive for arthralgias. Negative for back pain.   Skin: Negative.  Negative for rash.   Allergic/Immunologic: Negative for immunocompromised state.   Neurological: Negative.  Negative for dizziness, weakness, light-headedness and headaches.   Psychiatric/Behavioral: Negative.  Negative for agitation.        Objective   Vitals:  /80   Pulse 66   Ht 1.6 m (5' 3\")   Wt 75.3 kg (166 lb)   SpO2 97%   BMI 29.41 kg/m²       Physical Exam  Constitutional:       General: She is not in acute distress.  HENT:      Head: Normocephalic.      " Nose: Nose normal.      Mouth/Throat:      Mouth: Mucous membranes are moist.   Eyes:      Conjunctiva/sclera: Conjunctivae normal.      Pupils: Pupils are equal, round, and reactive to light.   Cardiovascular:      Rate and Rhythm: Normal rate and regular rhythm.      Pulses: Normal pulses.      Heart sounds: Normal heart sounds.   Pulmonary:      Effort: No respiratory distress.      Breath sounds: No wheezing.   Chest:      Chest wall: No tenderness.   Abdominal:      General: Abdomen is flat. Bowel sounds are normal.      Palpations: Abdomen is soft.      Tenderness: There is no abdominal tenderness.   Musculoskeletal:         General: No tenderness. Normal range of motion.      Cervical back: Normal range of motion.      Comments: NORMAL RANGE OF MOTION OF L ELBOW W/O TENDERNESS , NO INFLAMMATION.   Lymphadenopathy:      Cervical: No cervical adenopathy.   Skin:     General: Skin is warm and dry.      Findings: No rash.   Neurological:      General: No focal deficit present.      Mental Status: She is alert. Mental status is at baseline.   Psychiatric:         Mood and Affect: Mood normal.         Behavior: Behavior normal.         Assessment/Plan   1. Routine general medical examination at health care facility        2. Encounter for screening mammogram for breast cancer  BI mammo bilateral screening tomosynthesis      3. Postmenopausal  XR DEXA bone density      4. Left elbow pain  XR elbow left 3+ views      5. History of trauma  XR elbow left 3+ views      6. Chronic diarrhea        7. S/P laparoscopic cholecystectomy        8. Benign essential HTN        9. Glucose intolerance          TEST RESULTS WERE DISCUSSED.  ADVISED TO HAVE LOW FAT AND LOW CALORIE DIET AND TO LOOSE WEIGHT, DAILY EXERCISE.  I HIGHLY ADVISED TO RESTART THE CHOLESTYRAMINE. EXPLAINED CAN REDUCE IT TO DAILY AND GRADUALLY INCREASE IT TO TID FOR DIARRHEA.  ADVISED TO APPLY WARM COMPRESSION AND OTC PAIN CREAM PRN FOR PAIN.  ADVISED FOR FALL  PRECAUTION.  Advanced Care Planing discussed, diagnosis , treatment and prognosis discussed with pt,pt has capacity to make own decision, pt has a living will, to bring a copy for the chart.    MDM    1) COMPLEXITY: 1 UNDIAGNOSED NEW PROBLEM WITH UNCERTAIN PROGNOSIS  2)DATA: TESTS INTERPRETED AND OR ORDERED, TOOK INDEPENDENT HISTORY OR RECORDS REVIEWED  3)RISK: MODERATE RISK DUE TO NATURE OF MEDICAL CONDITIONS/COMORBIDITY OR MEDICATIONS ORDERED OR SURGICAL OR PROCEDURE REFERRAL, .      2 mon

## 2023-10-17 ENCOUNTER — HOSPITAL ENCOUNTER (OUTPATIENT)
Dept: RADIOLOGY | Facility: HOSPITAL | Age: 68
Discharge: HOME | End: 2023-10-17
Payer: MEDICARE

## 2023-10-17 DIAGNOSIS — M25.522 LEFT ELBOW PAIN: ICD-10-CM

## 2023-10-17 DIAGNOSIS — Z87.828 HISTORY OF TRAUMA: ICD-10-CM

## 2023-10-17 PROCEDURE — 73080 X-RAY EXAM OF ELBOW: CPT | Mod: LEFT SIDE | Performed by: STUDENT IN AN ORGANIZED HEALTH CARE EDUCATION/TRAINING PROGRAM

## 2023-10-17 PROCEDURE — 73080 X-RAY EXAM OF ELBOW: CPT | Mod: LT

## 2023-11-03 ENCOUNTER — ANCILLARY PROCEDURE (OUTPATIENT)
Dept: RADIOLOGY | Facility: CLINIC | Age: 68
End: 2023-11-03
Payer: MEDICARE

## 2023-11-03 ENCOUNTER — APPOINTMENT (OUTPATIENT)
Dept: RADIOLOGY | Facility: CLINIC | Age: 68
End: 2023-11-03
Payer: MEDICARE

## 2023-11-03 DIAGNOSIS — Z12.31 ENCOUNTER FOR SCREENING MAMMOGRAM FOR BREAST CANCER: ICD-10-CM

## 2023-11-03 PROCEDURE — 77063 BREAST TOMOSYNTHESIS BI: CPT

## 2023-11-03 PROCEDURE — 77067 SCR MAMMO BI INCL CAD: CPT | Mod: BILATERAL PROCEDURE | Performed by: RADIOLOGY

## 2023-11-03 PROCEDURE — 77063 BREAST TOMOSYNTHESIS BI: CPT | Mod: BILATERAL PROCEDURE | Performed by: RADIOLOGY

## 2023-11-10 ENCOUNTER — ANCILLARY PROCEDURE (OUTPATIENT)
Dept: RADIOLOGY | Facility: CLINIC | Age: 68
End: 2023-11-10
Payer: MEDICARE

## 2023-11-10 DIAGNOSIS — Z78.0 POSTMENOPAUSAL: ICD-10-CM

## 2023-11-10 PROCEDURE — 77085 DXA BONE DENSITY AXL VRT FX: CPT | Performed by: RADIOLOGY

## 2023-11-10 PROCEDURE — 77080 DXA BONE DENSITY AXIAL: CPT

## 2023-12-07 ENCOUNTER — TELEPHONE (OUTPATIENT)
Dept: PRIMARY CARE | Facility: CLINIC | Age: 68
End: 2023-12-07

## 2023-12-07 ENCOUNTER — OFFICE VISIT (OUTPATIENT)
Dept: PRIMARY CARE | Facility: CLINIC | Age: 68
End: 2023-12-07
Payer: MEDICARE

## 2023-12-07 VITALS
WEIGHT: 165 LBS | SYSTOLIC BLOOD PRESSURE: 124 MMHG | BODY MASS INDEX: 29.23 KG/M2 | HEART RATE: 60 BPM | DIASTOLIC BLOOD PRESSURE: 72 MMHG | HEIGHT: 63 IN

## 2023-12-07 DIAGNOSIS — R63.0 POOR APPETITE: ICD-10-CM

## 2023-12-07 DIAGNOSIS — K52.9 CHRONIC DIARRHEA: Primary | ICD-10-CM

## 2023-12-07 DIAGNOSIS — J30.9 ALLERGIC RHINITIS, UNSPECIFIED SEASONALITY, UNSPECIFIED TRIGGER: ICD-10-CM

## 2023-12-07 DIAGNOSIS — R53.82 CHRONIC FATIGUE: ICD-10-CM

## 2023-12-07 DIAGNOSIS — Z90.49 S/P LAPAROSCOPIC CHOLECYSTECTOMY: ICD-10-CM

## 2023-12-07 DIAGNOSIS — I10 BENIGN ESSENTIAL HTN: ICD-10-CM

## 2023-12-07 DIAGNOSIS — Z98.84 BARIATRIC SURGERY STATUS: ICD-10-CM

## 2023-12-07 DIAGNOSIS — R63.4 WEIGHT LOSS: ICD-10-CM

## 2023-12-07 DIAGNOSIS — K21.9 GASTROESOPHAGEAL REFLUX DISEASE WITHOUT ESOPHAGITIS: ICD-10-CM

## 2023-12-07 PROCEDURE — 1160F RVW MEDS BY RX/DR IN RCRD: CPT | Performed by: INTERNAL MEDICINE

## 2023-12-07 PROCEDURE — 3008F BODY MASS INDEX DOCD: CPT | Performed by: INTERNAL MEDICINE

## 2023-12-07 PROCEDURE — 1036F TOBACCO NON-USER: CPT | Performed by: INTERNAL MEDICINE

## 2023-12-07 PROCEDURE — 3074F SYST BP LT 130 MM HG: CPT | Performed by: INTERNAL MEDICINE

## 2023-12-07 PROCEDURE — 3078F DIAST BP <80 MM HG: CPT | Performed by: INTERNAL MEDICINE

## 2023-12-07 PROCEDURE — 99214 OFFICE O/P EST MOD 30 MIN: CPT | Performed by: INTERNAL MEDICINE

## 2023-12-07 PROCEDURE — 1159F MED LIST DOCD IN RCRD: CPT | Performed by: INTERNAL MEDICINE

## 2023-12-07 RX ORDER — FAMOTIDINE 20 MG/1
20 TABLET, FILM COATED ORAL 2 TIMES DAILY PRN
Qty: 180 TABLET | Refills: 3 | Status: SHIPPED | OUTPATIENT
Start: 2023-12-07

## 2023-12-07 RX ORDER — OMEPRAZOLE 40 MG/1
40 CAPSULE, DELAYED RELEASE ORAL
Qty: 90 CAPSULE | Refills: 3 | Status: SHIPPED | OUTPATIENT
Start: 2023-12-07

## 2023-12-07 RX ORDER — MINERAL OIL
180 ENEMA (ML) RECTAL DAILY
Qty: 90 TABLET | Refills: 3 | Status: SHIPPED | OUTPATIENT
Start: 2023-12-07

## 2023-12-07 RX ORDER — AMLODIPINE BESYLATE 5 MG/1
5 TABLET ORAL DAILY
Qty: 90 TABLET | Refills: 3 | Status: SHIPPED | OUTPATIENT
Start: 2023-12-07 | End: 2023-12-20

## 2023-12-07 RX ORDER — CHOLESTYRAMINE 4 G/4.8G
1 POWDER, FOR SUSPENSION ORAL 2 TIMES DAILY
Qty: 60 PACKET | Refills: 11 | Status: SHIPPED | OUTPATIENT
Start: 2023-12-07 | End: 2024-01-15 | Stop reason: SINTOL

## 2023-12-07 ASSESSMENT — ENCOUNTER SYMPTOMS
CARDIOVASCULAR NEGATIVE: 1
FEVER: 0
COUGH: 0
ENDOCRINE NEGATIVE: 1
ABDOMINAL DISTENTION: 0
WEAKNESS: 0
NEUROLOGICAL NEGATIVE: 1
BACK PAIN: 0
DIZZINESS: 0
PSYCHIATRIC NEGATIVE: 1
HEADACHES: 0
PALPITATIONS: 0
ABDOMINAL PAIN: 0
NAUSEA: 0
RHINORRHEA: 0
DYSURIA: 0
MUSCULOSKELETAL NEGATIVE: 1
LIGHT-HEADEDNESS: 0
DIARRHEA: 1
SHORTNESS OF BREATH: 0
VOMITING: 0
FATIGUE: 1
EYES NEGATIVE: 1
CONSTIPATION: 0
WHEEZING: 0
CHILLS: 0
AGITATION: 0

## 2023-12-07 NOTE — PROGRESS NOTES
Subjective   Patient ID: Yolette Morrow is a 68 y.o. female who presents for Follow-up (2 MONTH F/U XRAY LEFT ELBOW, MAMMOGRAM, DEXA BONE DENSITY. C/O BACK PAIN S/P FALL IN AUGUST 2023 - SCHEDULED FOR MRI SPINE IN JANUARY. C/O CHRONIC DIARRHEA SINCE CHOLECYSTECTOMY AND DECREASED ).  HPI  F/U ON XR, MAMMO AND BONE DENSITY . MED REFILL. WORSENING Chronic diarrhea ,S/P CHOLECYSTECTOMY A WHILE AGO. CHOLESTYRAMINE DOESN'T HELP MUCH. POOR APPETITE , INDIGESTION ,WEIGHT LOSS.,FATIGUE.  Review of Systems   Constitutional:  Positive for fatigue. Negative for chills and fever.        POOR APPETITE,WEIGHT LOSS.   HENT: Negative.  Negative for congestion, postnasal drip and rhinorrhea.    Eyes: Negative.  Negative for visual disturbance.   Respiratory:  Negative for cough, shortness of breath and wheezing.    Cardiovascular: Negative.  Negative for chest pain, palpitations and leg swelling.   Gastrointestinal:  Positive for diarrhea. Negative for abdominal distention, abdominal pain, constipation, nausea and vomiting.   Endocrine: Negative.    Genitourinary:  Negative for dysuria and urgency.   Musculoskeletal: Negative.  Negative for back pain.   Skin: Negative.  Negative for rash.   Allergic/Immunologic: Negative for immunocompromised state.   Neurological: Negative.  Negative for dizziness, weakness, light-headedness and headaches.   Psychiatric/Behavioral: Negative.  Negative for agitation.        Objective   Physical Exam  Constitutional:       General: She is not in acute distress.  HENT:      Head: Normocephalic.      Nose: Nose normal.      Mouth/Throat:      Mouth: Mucous membranes are moist.   Eyes:      Conjunctiva/sclera: Conjunctivae normal.      Pupils: Pupils are equal, round, and reactive to light.   Cardiovascular:      Rate and Rhythm: Normal rate and regular rhythm.      Pulses: Normal pulses.      Heart sounds: Normal heart sounds.   Pulmonary:      Effort: No respiratory distress.      Breath sounds: No  wheezing.   Chest:      Chest wall: No tenderness.   Abdominal:      General: Abdomen is flat. Bowel sounds are normal.      Palpations: Abdomen is soft.      Tenderness: There is no abdominal tenderness.   Musculoskeletal:         General: No tenderness. Normal range of motion.      Cervical back: Normal range of motion.   Lymphadenopathy:      Cervical: No cervical adenopathy.   Skin:     General: Skin is warm and dry.      Findings: No rash.   Neurological:      General: No focal deficit present.      Mental Status: She is alert. Mental status is at baseline.   Psychiatric:         Mood and Affect: Mood normal.         Behavior: Behavior normal.         Assessment/Plan   1. Chronic diarrhea  Colonoscopy Screening; Average Risk Patient    EGD    cholestyramine light (Prevalite) 4 gram packet      2. Gastroesophageal reflux disease without esophagitis  EGD    famotidine (Pepcid) 20 mg tablet    omeprazole (PriLOSEC) 40 mg DR capsule      3. S/P laparoscopic cholecystectomy  Colonoscopy Screening; Average Risk Patient    EGD      4. Bariatric surgery status  EGD      5. Poor appetite  EGD      6. Benign essential HTN  amLODIPine (Norvasc) 5 mg tablet    metoprolol succinate XL (Kapspargo Sprinkle) 25 mg 24 hr capsule      7. Allergic rhinitis, unspecified seasonality, unspecified trigger  fexofenadine (Allegra) 180 mg tablet      8. Chronic fatigue        9. Weight loss        TEST RESULTS WERE DISCUSSED.  ADVISED TO HAVE LOW FAT AND LOW CALORIE DIET , DAILY EXERCISE. WILL INCREASE THE CHOLESTYRAMINE TO BID.  MONITOR BP   GOAL BP LOWER THAN 130/80  LOW SALT  EXERCISE DAILY  Stop smoking and chewing tobacco.  Lose weight.  Do not overeat. Eat small portions at meals and snacks.  Avoid tight clothing and tight-fitting belts. Do not lie down or bend over within the first 15-30 minutes after eating.  Do not chew gum or suck on hard candy. Swallowing air with chewing gum and sucking on hard candy can cause belching and  reflux.  Raise the head of your bed 6-8 inches.  Do not eat/drink: chocolate, tomatoes, tomato sauces, oranges, pineapple, grapefruit, mints, coffee, alcohol, carbonated beverages, and black pepper.  Eat a low fat diet. Fatty and greasy foods cause your stomach to produce more acid.  ADVISED FOR GLUTEN FREE DIET.    MDM    1) COMPLEXITY: 1 UNDIAGNOSED NEW PROBLEM WITH UNCERTAIN PROGNOSIS  2)DATA: TESTS INTERPRETED AND OR ORDERED, TOOK INDEPENDENT HISTORY OR RECORDS REVIEWED  3)RISK: MODERATE RISK DUE TO NATURE OF MEDICAL CONDITIONS/COMORBIDITY OR MEDICATIONS ORDERED OR SURGICAL OR PROCEDURE REFERRAL, .       4 weeks (depend on scope schedule).

## 2023-12-07 NOTE — TELEPHONE ENCOUNTER
PATIENT CALLED BACK - SHE CAN'T DO SCOPES ON 1/18/23 BECAUSE SHE IS SCHEDULED TO HAVE ANOTHER SURGERY THE DAY BEFORE. PLEASE CALL HER BACK TO RESCHEDULE.

## 2023-12-08 DIAGNOSIS — I10 BENIGN ESSENTIAL HTN: ICD-10-CM

## 2023-12-08 NOTE — TELEPHONE ENCOUNTER
RECEIVED FAX FROM [employer] STATING KAPSPARGO SPRINKLE 25MG CAP IS NOT COVERED BY INSURANCE. SUGGEST CHANGE TO ALTERNATIVE PREFERRED MEDICATION METOPROLOL SUCC ER 25MG TAB.

## 2023-12-14 RX ORDER — METOPROLOL SUCCINATE 25 MG/1
25 TABLET, EXTENDED RELEASE ORAL DAILY
Qty: 90 TABLET | Refills: 3 | Status: SHIPPED | OUTPATIENT
Start: 2023-12-14 | End: 2024-01-15 | Stop reason: SDUPTHER

## 2023-12-18 DIAGNOSIS — I10 BENIGN ESSENTIAL HTN: ICD-10-CM

## 2023-12-20 RX ORDER — AMLODIPINE BESYLATE 5 MG/1
5 TABLET ORAL DAILY
Qty: 90 TABLET | Refills: 3 | Status: SHIPPED | OUTPATIENT
Start: 2023-12-20

## 2024-01-17 ENCOUNTER — PREP FOR PROCEDURE (OUTPATIENT)
Dept: PRIMARY CARE | Facility: CLINIC | Age: 69
End: 2024-01-17
Payer: MEDICARE

## 2024-01-17 RX ORDER — SODIUM CHLORIDE, SODIUM LACTATE, POTASSIUM CHLORIDE, CALCIUM CHLORIDE 600; 310; 30; 20 MG/100ML; MG/100ML; MG/100ML; MG/100ML
20 INJECTION, SOLUTION INTRAVENOUS CONTINUOUS
Status: CANCELLED | OUTPATIENT
Start: 2024-01-17

## 2024-01-18 ENCOUNTER — TELEPHONE (OUTPATIENT)
Dept: GASTROENTEROLOGY | Facility: CLINIC | Age: 69
End: 2024-01-18

## 2024-01-18 ENCOUNTER — HOSPITAL ENCOUNTER (OUTPATIENT)
Dept: GASTROENTEROLOGY | Facility: CLINIC | Age: 69
Setting detail: OUTPATIENT SURGERY
Discharge: HOME | End: 2024-01-18
Payer: MEDICARE

## 2024-01-18 VITALS
DIASTOLIC BLOOD PRESSURE: 72 MMHG | TEMPERATURE: 97.5 F | BODY MASS INDEX: 27.48 KG/M2 | HEART RATE: 62 BPM | OXYGEN SATURATION: 96 % | SYSTOLIC BLOOD PRESSURE: 116 MMHG | RESPIRATION RATE: 16 BRPM | HEIGHT: 64 IN | WEIGHT: 160.94 LBS

## 2024-01-18 DIAGNOSIS — K21.9 GASTROESOPHAGEAL REFLUX DISEASE WITHOUT ESOPHAGITIS: ICD-10-CM

## 2024-01-18 DIAGNOSIS — Z98.84 BARIATRIC SURGERY STATUS: ICD-10-CM

## 2024-01-18 DIAGNOSIS — K52.9 CHRONIC DIARRHEA: ICD-10-CM

## 2024-01-18 DIAGNOSIS — Z90.49 S/P LAPAROSCOPIC CHOLECYSTECTOMY: ICD-10-CM

## 2024-01-18 DIAGNOSIS — R63.0 POOR APPETITE: ICD-10-CM

## 2024-01-18 PROCEDURE — 2500000001 HC RX 250 WO HCPCS SELF ADMINISTERED DRUGS (ALT 637 FOR MEDICARE OP): Performed by: INTERNAL MEDICINE

## 2024-01-18 PROCEDURE — 7100000010 HC PHASE TWO TIME - EACH INCREMENTAL 1 MINUTE

## 2024-01-18 PROCEDURE — 45380 COLONOSCOPY AND BIOPSY: CPT | Performed by: INTERNAL MEDICINE

## 2024-01-18 PROCEDURE — 99153 MOD SED SAME PHYS/QHP EA: CPT | Performed by: INTERNAL MEDICINE

## 2024-01-18 PROCEDURE — 43235 EGD DIAGNOSTIC BRUSH WASH: CPT | Performed by: INTERNAL MEDICINE

## 2024-01-18 PROCEDURE — 2500000004 HC RX 250 GENERAL PHARMACY W/ HCPCS (ALT 636 FOR OP/ED): Performed by: INTERNAL MEDICINE

## 2024-01-18 PROCEDURE — 3700000013 HC SEDATION LEVEL 5+ TIME - EACH ADDITIONAL 15 MINUTES

## 2024-01-18 PROCEDURE — G0500 MOD SEDAT ENDO SERVICE >5YRS: HCPCS | Performed by: INTERNAL MEDICINE

## 2024-01-18 PROCEDURE — 3700000012 HC SEDATION LEVEL 5+ TIME - INITIAL 15 MINUTES 5/> YEARS

## 2024-01-18 PROCEDURE — 0753T DGTZ GLS MCRSCP SLD LEVEL IV: CPT | Mod: TC,SUR,SAMLAB,WESLAB | Performed by: INTERNAL MEDICINE

## 2024-01-18 PROCEDURE — 7100000009 HC PHASE TWO TIME - INITIAL BASE CHARGE

## 2024-01-18 PROCEDURE — 88305 TISSUE EXAM BY PATHOLOGIST: CPT | Performed by: PATHOLOGY

## 2024-01-18 RX ORDER — MEPERIDINE HYDROCHLORIDE 25 MG/ML
INJECTION INTRAMUSCULAR; INTRAVENOUS; SUBCUTANEOUS AS NEEDED
Status: COMPLETED | OUTPATIENT
Start: 2024-01-18 | End: 2024-01-18

## 2024-01-18 RX ORDER — SODIUM CHLORIDE, SODIUM LACTATE, POTASSIUM CHLORIDE, CALCIUM CHLORIDE 600; 310; 30; 20 MG/100ML; MG/100ML; MG/100ML; MG/100ML
20 INJECTION, SOLUTION INTRAVENOUS CONTINUOUS
Status: DISCONTINUED | OUTPATIENT
Start: 2024-01-18 | End: 2024-01-19 | Stop reason: HOSPADM

## 2024-01-18 RX ORDER — MIDAZOLAM HYDROCHLORIDE 5 MG/ML
INJECTION, SOLUTION INTRAMUSCULAR; INTRAVENOUS AS NEEDED
Status: COMPLETED | OUTPATIENT
Start: 2024-01-18 | End: 2024-01-18

## 2024-01-18 RX ORDER — LIDOCAINE HYDROCHLORIDE 20 MG/ML
SOLUTION OROPHARYNGEAL AS NEEDED
Status: COMPLETED | OUTPATIENT
Start: 2024-01-18 | End: 2024-01-18

## 2024-01-18 RX ADMIN — LIDOCAINE HYDROCHLORIDE 15 ML: 20 SOLUTION ORAL; TOPICAL at 11:39

## 2024-01-18 RX ADMIN — MIDAZOLAM HYDROCHLORIDE 2.5 MG: 5 INJECTION, SOLUTION INTRAMUSCULAR; INTRAVENOUS at 11:41

## 2024-01-18 RX ADMIN — MIDAZOLAM HYDROCHLORIDE 2.5 MG: 5 INJECTION, SOLUTION INTRAMUSCULAR; INTRAVENOUS at 11:43

## 2024-01-18 RX ADMIN — MEPERIDINE HYDROCHLORIDE 25 MG: 25 INJECTION INTRAMUSCULAR; INTRAVENOUS; SUBCUTANEOUS at 11:42

## 2024-01-18 RX ADMIN — MEPERIDINE HYDROCHLORIDE 25 MG: 25 INJECTION INTRAMUSCULAR; INTRAVENOUS; SUBCUTANEOUS at 11:55

## 2024-01-18 RX ADMIN — SODIUM CHLORIDE, POTASSIUM CHLORIDE, SODIUM LACTATE AND CALCIUM CHLORIDE 20 ML/HR: 600; 310; 30; 20 INJECTION, SOLUTION INTRAVENOUS at 10:48

## 2024-01-18 RX ADMIN — MIDAZOLAM HYDROCHLORIDE 2.5 MG: 5 INJECTION, SOLUTION INTRAMUSCULAR; INTRAVENOUS at 11:56

## 2024-01-18 ASSESSMENT — PAIN - FUNCTIONAL ASSESSMENT
PAIN_FUNCTIONAL_ASSESSMENT: 0-10

## 2024-01-18 ASSESSMENT — PAIN SCALES - GENERAL
PAINLEVEL_OUTOF10: 0 - NO PAIN
PAINLEVEL_OUTOF10: 0 - NO PAIN
PAINLEVEL_OUTOF10: 4
PAINLEVEL_OUTOF10: 0 - NO PAIN
PAINLEVEL_OUTOF10: 0 - NO PAIN
PAINLEVEL_OUTOF10: 4
PAINLEVEL_OUTOF10: 0 - NO PAIN

## 2024-01-18 ASSESSMENT — COLUMBIA-SUICIDE SEVERITY RATING SCALE - C-SSRS
2. HAVE YOU ACTUALLY HAD ANY THOUGHTS OF KILLING YOURSELF?: NO
1. IN THE PAST MONTH, HAVE YOU WISHED YOU WERE DEAD OR WISHED YOU COULD GO TO SLEEP AND NOT WAKE UP?: NO
6. HAVE YOU EVER DONE ANYTHING, STARTED TO DO ANYTHING, OR PREPARED TO DO ANYTHING TO END YOUR LIFE?: NO

## 2024-01-18 NOTE — DISCHARGE INSTRUCTIONS
Patient Instructions after a Colonoscopy      The anesthetics, sedatives or narcotics which were given to you today will be acting in your body for the next 24 hours, so you might feel a little sleepy or groggy.  This feeling should slowly wear off. Carefully read and follow the instructions.     You received sedation today:  - Do not drive or operate any machinery or power tools of any kind.   - No alcoholic beverages today, not even beer or wine.  - Do not make any important decisions or sign any legal documents.  - No over the counter medications that contain alcohol or that may cause drowsiness.  - Do not make any important decisions or sign any legal documents.    While it is common to experience mild to moderate abdominal distention, gas, or belching after your procedure, if any of these symptoms occur following discharge from the GI Lab or within one week of having your procedure, call the [facility] to be advised whether a visit to your nearest Urgent Care or Emergency Department is indicated.  Take this paper with you if you go.     - If you develop an allergic reaction to the medications that were given during your procedure such as difficulty breathing, rash, hives, severe nausea, vomiting or lightheadedness.  - If you experience chest pain, shortness of breath, severe abdominal pain, fevers and chills.  -If you develop signs and symptoms of bleeding such as blood in your spit, if your stools turn black, tarry, or bloody  - If you have not urinated within 8 hours following your procedure.  - If your IV site becomes painful, red, inflamed, or looks infected.    If you received a biopsy/polypectomy/sphincterotomy the following instructions apply below:    __ Do not use Aspirin containing products, non-steroidal medications or anti-coagulants for one week following your procedure. (Examples of these types of medications are: Advil, Arthrotec, Aleve, Coumadin, Ecotrin, Heparin, Ibuprofen,  Indocin, Motrin, Naprosyn, Nuprin, Plavix, Vioxx, and Voltarin, or their generic forms.  This list is not all-inclusive.  Check with your physician or pharmacist before resuming medications.)   __ Eat a soft diet today.  Avoid foods that are poorly digested for the next 24 hours.  These foods would include: nuts, beans, lettuce, red meats, and fried foods. Start with liquids and advance your diet as tolerated, gradually work up to eating solids.   __ Do not have a Barium Study or Enema for one week.    Your physician recommends the additional following instructions:    -You have a contact number available for emergencies. The signs and symptoms of potential delayed complications were discussed with you. You may return to normal activities tomorrow.  -Resume your previous diet.  -Continue your present medications.   -We are waiting for your pathology results.  -Your physician has recommended a repeat colonoscopy (date to be determined after pending pathology results are reviewed) for surveillance based on pathology results.  -The findings and recommendations have been discussed with you.  -The findings and recommendations were discussed with your family.  - Please see Medication Reconciliation Form for new medication/medications prescribed.       If you experience any problems or have any questions following discharge from the GI Lab, please call: Dr. Solitario's office.

## 2024-01-18 NOTE — Clinical Note
AFTER MULTIPLE ATTEMPTS BY DR. ARMIJO TO INSERT EGD SCOPE-UNABLE TO ADVANCE SCOPE PAST TONGUE/BACK OF THROAT-PATIENT GAGGING AND SITTING UP IN BED.  UNABLE TO COMPLETE EGD

## 2024-01-18 NOTE — H&P
Pre procedure H&P  Pt feels fine , no complaint  General appearance: Comfortable, no distress  ROS: No SOB  Medications reviewed  Head: Normal  Neck: Soft  Heart: Regular  Lungs: Clear  Abdomen: soft    Impression: clinically doing fine, proceed with procedure    Problem List Items Addressed This Visit       Bariatric surgery status    Relevant Orders    EGD    Chronic diarrhea    Relevant Orders    Colonoscopy Screening; Average Risk Patient    EGD    GERD (gastroesophageal reflux disease)    Relevant Orders    EGD    S/P laparoscopic cholecystectomy    Relevant Orders    Colonoscopy Screening; Average Risk Patient    EGD     Other Visit Diagnoses       Poor appetite        Relevant Orders    EGD

## 2024-01-19 ENCOUNTER — HOSPITAL ENCOUNTER (OUTPATIENT)
Dept: OPERATING ROOM | Facility: HOSPITAL | Age: 69
Setting detail: OUTPATIENT SURGERY
Discharge: HOME | End: 2024-01-19
Payer: MEDICARE

## 2024-01-19 ENCOUNTER — ANESTHESIA EVENT (OUTPATIENT)
Dept: OPERATING ROOM | Facility: HOSPITAL | Age: 69
End: 2024-01-19
Payer: MEDICARE

## 2024-01-19 ENCOUNTER — ANESTHESIA (OUTPATIENT)
Dept: OPERATING ROOM | Facility: HOSPITAL | Age: 69
End: 2024-01-19
Payer: MEDICARE

## 2024-01-19 VITALS
DIASTOLIC BLOOD PRESSURE: 76 MMHG | HEART RATE: 62 BPM | OXYGEN SATURATION: 100 % | SYSTOLIC BLOOD PRESSURE: 124 MMHG | HEIGHT: 64 IN | TEMPERATURE: 97.6 F | WEIGHT: 164.68 LBS | BODY MASS INDEX: 28.12 KG/M2 | RESPIRATION RATE: 20 BRPM

## 2024-01-19 DIAGNOSIS — K21.9 GERD (GASTROESOPHAGEAL REFLUX DISEASE): ICD-10-CM

## 2024-01-19 DIAGNOSIS — K21.9 GASTROESOPHAGEAL REFLUX DISEASE, UNSPECIFIED WHETHER ESOPHAGITIS PRESENT: Primary | ICD-10-CM

## 2024-01-19 PROCEDURE — 88305 TISSUE EXAM BY PATHOLOGIST: CPT | Mod: TC,SUR,SAMLAB | Performed by: INTERNAL MEDICINE

## 2024-01-19 PROCEDURE — 2500000005 HC RX 250 GENERAL PHARMACY W/O HCPCS: Performed by: ANESTHESIOLOGY

## 2024-01-19 PROCEDURE — 2500000004 HC RX 250 GENERAL PHARMACY W/ HCPCS (ALT 636 FOR OP/ED): Performed by: ANESTHESIOLOGY

## 2024-01-19 PROCEDURE — 7100000010 HC PHASE TWO TIME - EACH INCREMENTAL 1 MINUTE: Performed by: ANESTHESIOLOGY

## 2024-01-19 PROCEDURE — 3600000002 HC OR TIME - INITIAL BASE CHARGE - PROCEDURE LEVEL TWO: Performed by: ANESTHESIOLOGY

## 2024-01-19 PROCEDURE — 3700000001 HC GENERAL ANESTHESIA TIME - INITIAL BASE CHARGE: Performed by: ANESTHESIOLOGY

## 2024-01-19 PROCEDURE — 43239 EGD BIOPSY SINGLE/MULTIPLE: CPT | Performed by: INTERNAL MEDICINE

## 2024-01-19 PROCEDURE — 3700000002 HC GENERAL ANESTHESIA TIME - EACH INCREMENTAL 1 MINUTE: Performed by: ANESTHESIOLOGY

## 2024-01-19 PROCEDURE — 7100000009 HC PHASE TWO TIME - INITIAL BASE CHARGE: Performed by: ANESTHESIOLOGY

## 2024-01-19 PROCEDURE — 88305 TISSUE EXAM BY PATHOLOGIST: CPT | Performed by: SPECIALIST

## 2024-01-19 PROCEDURE — 3600000007 HC OR TIME - EACH INCREMENTAL 1 MINUTE - PROCEDURE LEVEL TWO: Performed by: ANESTHESIOLOGY

## 2024-01-19 RX ORDER — MIDAZOLAM HYDROCHLORIDE 1 MG/ML
INJECTION INTRAMUSCULAR; INTRAVENOUS AS NEEDED
Status: DISCONTINUED | OUTPATIENT
Start: 2024-01-19 | End: 2024-01-19

## 2024-01-19 RX ORDER — SODIUM CHLORIDE, SODIUM LACTATE, POTASSIUM CHLORIDE, CALCIUM CHLORIDE 600; 310; 30; 20 MG/100ML; MG/100ML; MG/100ML; MG/100ML
100 INJECTION, SOLUTION INTRAVENOUS CONTINUOUS
Status: CANCELLED | OUTPATIENT
Start: 2024-01-19

## 2024-01-19 RX ORDER — SODIUM CHLORIDE, SODIUM LACTATE, POTASSIUM CHLORIDE, CALCIUM CHLORIDE 600; 310; 30; 20 MG/100ML; MG/100ML; MG/100ML; MG/100ML
100 INJECTION, SOLUTION INTRAVENOUS CONTINUOUS
Status: DISCONTINUED | OUTPATIENT
Start: 2024-01-19 | End: 2024-01-20 | Stop reason: HOSPADM

## 2024-01-19 RX ORDER — ONDANSETRON HYDROCHLORIDE 2 MG/ML
4 INJECTION, SOLUTION INTRAVENOUS ONCE
Status: COMPLETED | OUTPATIENT
Start: 2024-01-19 | End: 2024-01-19

## 2024-01-19 RX ORDER — PROPOFOL 10 MG/ML
INJECTION, EMULSION INTRAVENOUS AS NEEDED
Status: DISCONTINUED | OUTPATIENT
Start: 2024-01-19 | End: 2024-01-19

## 2024-01-19 RX ORDER — SODIUM CHLORIDE, SODIUM LACTATE, POTASSIUM CHLORIDE, CALCIUM CHLORIDE 600; 310; 30; 20 MG/100ML; MG/100ML; MG/100ML; MG/100ML
20 INJECTION, SOLUTION INTRAVENOUS CONTINUOUS
OUTPATIENT
Start: 2024-01-19

## 2024-01-19 RX ORDER — ONDANSETRON HYDROCHLORIDE 2 MG/ML
4 INJECTION, SOLUTION INTRAVENOUS ONCE
Status: CANCELLED | OUTPATIENT
Start: 2024-01-19 | End: 2024-01-19

## 2024-01-19 RX ORDER — MIDAZOLAM HYDROCHLORIDE 1 MG/ML
2 INJECTION INTRAMUSCULAR; INTRAVENOUS ONCE AS NEEDED
Status: CANCELLED | OUTPATIENT
Start: 2024-01-19

## 2024-01-19 RX ORDER — MIDAZOLAM HYDROCHLORIDE 1 MG/ML
2 INJECTION INTRAMUSCULAR; INTRAVENOUS ONCE AS NEEDED
Status: COMPLETED | OUTPATIENT
Start: 2024-01-19 | End: 2024-01-19

## 2024-01-19 RX ADMIN — SODIUM CHLORIDE, POTASSIUM CHLORIDE, SODIUM LACTATE AND CALCIUM CHLORIDE 100 ML/HR: 600; 310; 30; 20 INJECTION, SOLUTION INTRAVENOUS at 09:53

## 2024-01-19 RX ADMIN — MIDAZOLAM HYDROCHLORIDE 2 MG: 1 INJECTION, SOLUTION INTRAMUSCULAR; INTRAVENOUS at 09:55

## 2024-01-19 RX ADMIN — ONDANSETRON 4 MG: 2 INJECTION INTRAMUSCULAR; INTRAVENOUS at 09:53

## 2024-01-19 RX ADMIN — BENZOCAINE, BUTAMBEN, AND TETRACAINE HYDROCHLORIDE 2 SPRAY: .028; .004; .004 AEROSOL, SPRAY TOPICAL at 10:01

## 2024-01-19 RX ADMIN — MIDAZOLAM HYDROCHLORIDE 2 MG: 1 INJECTION, SOLUTION INTRAMUSCULAR; INTRAVENOUS at 10:02

## 2024-01-19 RX ADMIN — PROPOFOL 40 MG: 10 INJECTION, EMULSION INTRAVENOUS at 10:08

## 2024-01-19 RX ADMIN — PROPOFOL 40 MG: 10 INJECTION, EMULSION INTRAVENOUS at 10:02

## 2024-01-19 SDOH — HEALTH STABILITY: MENTAL HEALTH: CURRENT SMOKER: 0

## 2024-01-19 ASSESSMENT — PAIN - FUNCTIONAL ASSESSMENT
PAIN_FUNCTIONAL_ASSESSMENT: 0-10

## 2024-01-19 ASSESSMENT — PAIN SCALES - GENERAL
PAINLEVEL_OUTOF10: 0 - NO PAIN
PAINLEVEL_OUTOF10: 6
PAINLEVEL_OUTOF10: 0 - NO PAIN
PAINLEVEL_OUTOF10: 0 - NO PAIN
PAIN_LEVEL: 0
PAINLEVEL_OUTOF10: 0 - NO PAIN
PAINLEVEL_OUTOF10: 0 - NO PAIN

## 2024-01-19 ASSESSMENT — COLUMBIA-SUICIDE SEVERITY RATING SCALE - C-SSRS
1. IN THE PAST MONTH, HAVE YOU WISHED YOU WERE DEAD OR WISHED YOU COULD GO TO SLEEP AND NOT WAKE UP?: NO
6. HAVE YOU EVER DONE ANYTHING, STARTED TO DO ANYTHING, OR PREPARED TO DO ANYTHING TO END YOUR LIFE?: NO
2. HAVE YOU ACTUALLY HAD ANY THOUGHTS OF KILLING YOURSELF?: NO

## 2024-01-19 NOTE — ANESTHESIA POSTPROCEDURE EVALUATION
Patient: Yolette Morrow    Procedure Summary       Date: 01/19/24 Room / Location: [facility] OR    Anesthesia Start: 1000 Anesthesia Stop: 1018    Procedure: EGD Diagnosis: GERD (gastroesophageal reflux disease)    Scheduled Providers: Brian Solitario MD; Logan Dozier MD Responsible Provider: Logan Dozier MD    Anesthesia Type: MAC ASA Status: 2            Anesthesia Type: MAC    Vitals Value Taken Time   BP  01/19/24 1026   Temp  01/19/24 1026   Pulse  01/19/24 1026   Resp  01/19/24 1026   SpO2  01/19/24 1026       Anesthesia Post Evaluation    Patient location during evaluation: PACU  Patient participation: complete - patient participated  Level of consciousness: awake and alert  Pain score: 0  Pain management: adequate  Multimodal analgesia pain management approach  Airway patency: patent  Cardiovascular status: acceptable  Respiratory status: acceptable  Hydration status: acceptable  Postoperative Nausea and Vomiting: none        No notable events documented.

## 2024-01-19 NOTE — ANESTHESIA PREPROCEDURE EVALUATION
Patient: Yolette Morrow    Procedure Information       Date/Time: 01/19/24 1130    Scheduled providers: Brian Solitario MD; Logan Dozier MD    Procedure: EGD    Location: [facility] OR            Relevant Problems   Cardiovascular   (+) Benign essential HTN   (+) Hyperlipidemia      GI   (+) Chronic diarrhea   (+) GERD (gastroesophageal reflux disease)   (+) Hiatal hernia   (+) Irritable bowel syndrome with both constipation and diarrhea      /Renal   (+) Fatty liver      Neuro/Psych   (+) Other specified mononeuropathies of right lower limb      Pulmonary   (+) Asthma      GI/Hepatic   (+) Fatty liver      Hematology   (+) Anemia   (+) Iron deficiency anemia      Musculoskeletal   (+) Primary osteoarthritis of both ankles       Clinical information reviewed:   Tobacco  Allergies  Meds  Problems  Med Hx  Surg Hx  OB Status    Fam Hx  Soc Hx        NPO Detail:  NPO/Void Status  Carbohydrate Drink Given Prior to Surgery? : N  Date of Last Liquid: 01/18/24  Time of Last Liquid: 2200  Date of Last Solid: 01/19/24  Time of Last Solid: 1630  Last Intake Type: Clear fluids  Time of Last Void: 0800         Physical Exam    Airway  Mallampati: II  TM distance: >3 FB  Neck ROM: full     Cardiovascular   Rhythm: regular  Rate: normal     Dental - normal exam     Pulmonary   Breath sounds clear to auscultation     Abdominal            Anesthesia Plan    History of general anesthesia?: yes  History of complications of general anesthesia?: no    ASA 2     MAC     The patient is not a current smoker.  Patient was not previously instructed to abstain from smoking on day of procedure.  Patient did not smoke on day of procedure.    intravenous induction   Anesthetic plan and risks discussed with patient.

## 2024-01-24 LAB
LABORATORY COMMENT REPORT: NORMAL
PATH REPORT.FINAL DX SPEC: NORMAL
PATH REPORT.GROSS SPEC: NORMAL
PATH REPORT.TOTAL CANCER: NORMAL

## 2024-02-05 ENCOUNTER — TELEPHONE (OUTPATIENT)
Dept: PRIMARY CARE | Facility: CLINIC | Age: 69
End: 2024-02-05
Payer: MEDICARE

## 2024-02-05 NOTE — TELEPHONE ENCOUNTER
FYI - PATIENT SAW NEUROSURGEON AT [facility] AND THEY ORDERED A SPINE MRI WHICH SHE HAD DONE. THE SURGEON CALLED HER AND SCHEDULED F/U APPOINTMENT THIS THURSDAY TO FURTHER DISCUSS RESULTS AND TALK ABOUT DOING SURGERY. HE BELIEVES THAT HER CHRONIC DIARRHEA MAY BE RELATED TO HER SPINE ABNORMALITIES.

## 2024-02-15 ENCOUNTER — TELEPHONE (OUTPATIENT)
Dept: PRIMARY CARE | Facility: CLINIC | Age: 69
End: 2024-02-15
Payer: MEDICARE

## 2024-02-15 NOTE — TELEPHONE ENCOUNTER
SHE IS SUPPOSED TO HAVE SPINE SURGERY IN APRIL. NEEDS OC FOR CLEARANCE . LET ME KNOW TO OLACE THE ORDER FOR LABS AND CXR WHEN SHE MAKES TH OC.

## 2024-02-15 NOTE — TELEPHONE ENCOUNTER
Scheduled medications  docusate sodium, 100 mg, oral, Once      Continuous medications     PRN medications

## 2024-02-21 DIAGNOSIS — Z01.811 PREOP RESPIRATORY EXAM: ICD-10-CM

## 2024-02-21 DIAGNOSIS — I10 BENIGN ESSENTIAL HTN: Primary | ICD-10-CM

## 2024-02-21 DIAGNOSIS — D64.9 ANEMIA, UNSPECIFIED TYPE: ICD-10-CM

## 2024-03-04 ENCOUNTER — HOSPITAL ENCOUNTER (OUTPATIENT)
Dept: HOSPITAL 100 - MRI | Age: 69
Discharge: HOME | End: 2024-03-04
Payer: MEDICARE

## 2024-03-04 DIAGNOSIS — G95.9: Primary | ICD-10-CM

## 2024-03-04 PROCEDURE — 72141 MRI NECK SPINE W/O DYE: CPT

## 2024-03-04 NOTE — XMS RPT_ITS
Comprehensive CCD (C-CDA v2.1)  
  
                            Created on: 2024  
  
  
SHANICE DAHL  
External Reference #: [id number]  
: 1955  
Sex: Female  
  
Demographics  
  
  
                                        Address              [address] [place]  [address]  
   
                                        Home Phone          [phone]  
   
                                        Home Phone          [phone]  
   
                                        Work Phone          [phone]  
   
                                        Work Phone          [phone]  
   
                                        Preferred Language  en  
   
                                        Marital Status        
   
                                        Yazdanism Affiliation Unknown  
   
                                        Race                White  
   
                                        Ethnic Group        Not  or Lati  
no  
  
  
Author  
  
  
                                        Name                Unknown  
   
                                        Address             [address]  
[address]  
[place]  [address]  
   
                                        Phone               [phone]  
   
                                        Organization        [facility]  
  
  
Care Team Providers  
  
  
                                Care Team Member Name Role            Phone  
   
                                Juan Adler Unavailable     Unavailable  
   
                                PROVIDER, UNKNOWN Unavailable     Unavailable  
   
                                Zarrabi, Yumiko Unavailable     Unavailable  
   
                                Cyrus Smartanak Primary Care Provider [phone]  
   
                                Alfredito Smartk Primary Care Provider [phone]  
   
                                Khaitan, Nia  Unavailable     Unavailable  
   
                                Zarrabi, Yumiko Unavailable     Unavailable  
   
                                Zarrabi, Yumiko Unavailable     Unavailable  
   
                                Khaitan, Nia  Unavailable     Unavailable  
   
                                Farrier, Estelle L Unavailable     Unavailable  
   
                                Farrier, Estelle  Unavailable     Unavailable  
   
                                Zarrabi, Yumiko Unavailable     Unavailable  
   
                                Tate, Debra Unavailable     Unavailable  
   
                                Zarrabi, Yumiko Unavailable     Unavailable  
   
                                Krysten Martines   Primary Care Provider [phone]  
   
                                Cyrus Smartanak Primary Care Provider [phone]  
   
                                Cyrus Smartanak Primary Care Provider [phone]  
   
                                Marcyrrabi Yumiko Unavailable     [phone]  
   
                                Unavailable     Unavailable     [phone]  
   
                                Unavailable     Unavailable     [phone]  
   
                                Omar Alamo MD Primary Care Provider [phone]  
[phone]  
   
                                Yumiko Smart MD Primary Care Provider [phone]  
[phone]  
   
                                Yumiko Smart MD Unavailable     [phone]  
   
                                Vivek AVILA, Padmaja Unavailable     Unavailable  
   
                                MD YUMIKO SMART Primary Care    Unavailable  
   
                                MD YUMIKO SMART Attending       Unavailable  
   
                                MD YUMIKO SMART Referring       Unavailable  
   
                                MD YUMIKO SMART Primary Care    Unavailable  
   
                                MD YUMIKO SMART Attending       Unavailable  
   
                                ZARRABI, MD CALDERON Referring       Unavailable  
   
                                ZARRABI, MD CALDERON Primary Care    Unavailable  
   
                                ZARRABI, MD CALDERON Attending       Unavailable  
   
                                ZARRABI, MD CALDERON Referring       Unavailable  
   
                                ZARRABI, MD CALDERON Primary Care    Unavailable  
   
                                ZARRABI, MD CALDERON Attending       Unavailable  
   
                                ZARRABI, MD CALDERON Referring       Unavailable  
   
                                ZARRABI, MD CALDERON Primary Care    Unavailable  
   
                                ZARRABI, MD CALDERON Attending       Unavailable  
   
                                ZARRABI, MD CALDERON Referring       Unavailable  
   
                                ZARRABI, MD CALDERON Attending       Unavailable  
   
                                ZARRABI, MD CALDERON Referring       Unavailable  
   
                                ZARRABI, MD CALDERON Primary Care    Unavailable  
   
                                ZARRABI, MD CALDERON Attending       Unavailable  
   
                                ZARRABI, MD CALDERON Referring       Unavailable  
   
                                ZARRABI, MD CALDERON Primary Care    Unavailable  
   
                                ZARRABI, MD CALDERON Attending       Unavailable  
   
                                ZARRABI, MD CALDERON Referring       Unavailable  
   
                                ZARRABI, MD CALDERON Primary Care    Unavailable  
   
                                KHAITAN, MD KRAMER Attending       Unavailable  
   
                                ZARRABI, MD CALDERON Primary Care    Unavailable  
   
                                KHAITAN, MD KRAMER Attending       Unavailable  
   
                                ZARRABI, MD CALDERON Primary Care    Unavailable  
   
                                ZARRABI, MD CALDERON Primary Care    Unavailable  
   
                                ZARRABI, MD CLADERON Attending       Unavailable  
   
                                ZARRABI, MD CALDERON Referring       Unavailable  
   
                                Zarrabi, Dr. Calderon Primary Care    Unavailable  
   
                                Khaitan, Dr. Kramer Attending       Unavailable  
   
                                ZARRABI, YUMIKO Primary Care    Unavailable  
   
                                Zarrabi, Dr. Calderon Referring       Unavailable  
   
                                Zarrabi, Dr. Calderon Primary Care    Unavailable  
   
                                Padmini Valiente Admitting       Unavailable  
   
                                Padmini Valiente Attending       Unavailable  
   
                                Zarrabi, Dr. Calderon Attending       Unavailable  
   
                                Zarrabi, Dr. Calderon Primary Care    Unavailable  
   
                                Zarrabi, Dr. Calderon Primary Care    Unavailable  
   
                                Zarrabi, Dr. Calderon Attending       Unavailable  
   
                                Zarrabi, Dr. Calderon Attending       Unavailable  
   
                                Zarrabi, Dr. Calderon Referring       Unavailable  
   
                                Zarrabi, Dr. Calderon Primary Care    Unavailable  
   
                                OMAR ALAMO Primary Care    Unavailable  
   
                                JOSE MARIA GONSALEZ Referring       Unavaila  
ble  
   
                                JOSE MARIA GONSALEZ Attending       Unavaila  
ble  
   
                                JOSE MARIA GONSALEZ Referring       Unavaila  
OMAR Sherman Primary Care    Unavailable  
   
                                JOSE MARIA GONSALEZ Attending       Unavaila  
ble  
   
                                JOSE MARIA GONSALEZ Admitting       Unavaila  
ble  
   
                                JOSSY, OMAR MILENA Primary Care    Unavailable  
   
                                ZARRABI, YUMIKO Attending       Unavailable  
   
                                ZARRABI, YUMIKO Primary Care    Unavailable  
   
                                ZARRABI, YUMIKO Attending       Unavailable  
   
                                ZARRABI, YUMIKO Primary Care    Unavailable  
   
                                ZARRABI, YUMIKO Attending       Unavailable  
   
                                ZARRABI, YUMIKO Referring       Unavailable  
   
                                ZARRABI, YUMIKO Primary Care    Unavailable  
   
                                ZARRABI, YUMIKO Attending       Unavailable  
   
                                ZARRABI, YUMIKO Primary Care    Unavailable  
   
                                ZARRABI, YUMIKO Attending       Unavailable  
   
                                ZARRABI, YUMIKO Primary Care    Unavailable  
   
                                JOSSY, OMAR MILENA Primary Care    Unavailable  
   
                                JOSE MARIA GONSALEZ Admitting       Unavaila  
ble  
   
                                JOSE MARIA GONSALEZ Referring       Unavaila  
ble  
   
                                JOSSY, OMAR MILENA Primary Care    Unavailable  
   
                                JOSE MARIA GONSALEZ Attending       Unavaila  
ble  
   
                                JOSSY, OMAR MILENA Primary Care    Unavailable  
   
                                JOSE MARIA GONSALEZ Attending       Unavaila  
ble  
   
                                AMILCAR ELIAS Attending       Unavailable  
   
                                JOSSY, OMAR MILENA Primary Care    Unavailable  
   
                                ZARRABI, YUMIKO Admitting       Unavailable  
   
                                ZARRABI, YUMIKO Referring       Unavailable  
   
                                JOSSY, OMAR MILENA Primary Care    Unavailable  
   
                                JOSE MARIA GONSALEZ Attending       Unavaila  
ble  
   
                                JOSSY, OMAR MILENA Primary Care    Unavailable  
   
                                JOSE MARIA GONSALEZ Attending       Unavaila  
ble  
   
                                TAVALLJERSONE, PADMINI M Attending       Unavailable  
   
                                ZARRABI, YUMIKO Referring       Unavailable  
   
                                ZARRABI, YUMIKO Primary Care    Unavailable  
   
                                TAVALLAEE PADMINI M Attending       Unavailable  
   
                                TAVALLAEE, PADMINI M Referring       Unavailable  
   
                                ZARRABI, YUMIKO Primary Care    Unavailable  
   
                                ZARRABI, YUMIKO Referring       Unavailable  
   
                                ZARRABI, YUMIKO Primary Care    Unavailable  
   
                                ZARRABI, YUMIKO Referring       Unavailable  
   
                                ZARRABI, YUMIKO Primary Care    Unavailable  
   
                                ZARRABI, YUMIKO Referring       Unavailable  
   
                                ZARRABI, YUMIKO Primary Care    Unavailable  
  
  
  
Allergies  
  
  
                                                    Allergy   
Classification                          Reported   
Allergen(s)               Allergy Type              Date of   
Onset                     Reaction(s)               Facility  
   
                                                    Dairy (not   
specified as   
lactose   
intolerance)  
(2 sources)  cow milk     Food Allergy              Nausea       [facility]   
Internal   
Medicine  
Work Phone:   
[phone]  
3  
   
                                                    Mold Extract  
(2 sources)  Mold Extract Drug Allergy              Other        [facility]   
Internal   
Medicine  
Work Phone:   
[phone]  
3  
   
                                                    Whey Proteins  
(2 sources)  Whey Proteins Drug Allergy              Hives        [facility]   
Internal   
Medicine  
Work Phone:   
[phone]  
3  
   
                                                      
(5 sources)                             Lactase /   
Lactobacillus   
acidophilus               Drug Allergy                
9                                                   [place]  
   
                                                      
(5 sources)               Seasonal allergy          Propensity to   
adverse   
reactions to   
substance                                 
9                                                   [place]  
   
                                                      
(20 sources)                            Whey Proteins;   
Translations:   
[WHEY]                    Drug Allergy                
9                         Rash, Hives               [place]  
   
                                                      
(20 sources)                            Mold Extract;   
Translations:   
[MOLD]                    Drug Allergy                
3                                       Runny nose,   
Other (See   
Comments)                               [facility] DO  
Work Phone:   
[phone]  
9  
   
                                                      
(20 sources)              Whey                      allergy to   
substance                               Hives               [facility] DO  
Work Phone:   
[phone]  
9  
   
                                                      
(20 sources)                            cow milk;   
Translations:   
[MILK]                                  Propensity to   
adverse   
reactions to   
food   
(disorder)                                
3                         Nausea                    [facility]  
   
                                                      
(3 sources)               Seasonal allergy          Propensity to   
adverse   
reactions                               10-  
3                         Itching                   [facility]  
   
                                                      
(2 sources)               Whey                      Propensity to   
adverse   
reactions to   
drug                                      
9                         Rash                      [place]  
   
                                                      
(2 sources)                             Lactase;   
Translations:   
[Dairy Ease]        Drug Allergy                            Abdominal   
pain, Diarrhea                          [facility]   
Internal   
Medicine  
Work Phone:   
[phone]  
3  
   
                                                      
(12 sources)                            cow milk   
allergenic   
extract                   Drug Allergy                
3                                       Nausea Only,   
Nausea and   
vomiting                                [facility]  
   
                                                      
(10 sources)                            Pollen;   
Translations:   
[POLLEN EXTRACTS]                       Propensity to   
adverse   
reactions to   
drug                                    04-  
3                                       Other (See   
Comments)                               [facility]  
  
  
  
Medications  
Current Medications  
  
  
  
                      Medication Drug Class(es) Dates      Sig (Normalized) Sig   
(Original)  
   
                                                    amLODIPine 5 mg   
oral tablet  
(20 sources)                            Dihydropyridine   
Calcium Channel   
Blocker                                 Start:   
2023                              take 1 tablet by   
mouth once daily                        amLODIPine   
(Norvasc) 5 mg   
tablet   
Indications:   
Benign essential   
HTN Take 1   
tablet by mouth   
once daily 90   
tablet 3   
2023   
Active  
  
  
  
                                          
   
                                          
   
                                          
   
                                          
   
                                          
   
                                          
   
                                          
   
                                          
   
                                          
   
                                          
   
                                          
   
                                          
   
                                          
   
                                          
   
                                          
   
                                          
   
                                          
   
                                          
   
                                          
   
                                          
   
                                          
   
                                          
   
                                          
   
                                          
   
                                          
   
                                          
   
                                          
   
                                          
  
  
  
Completed/Discontinued Medications  
  
  
  
                      Medication Drug Class(es) Dates      Sig (Normalized) Sig   
(Original)  
   
                                                    aspirin 81 mg   
delayed release   
oral tablet  
(20 sources)                            Platelet   
Aggregation   
Inhibitor,   
Nonsteroidal   
Anti-inflammatory   
Drug                                      
End:   
12-                              take 1 tablet by   
mouth once daily                        Aspirin EC Low   
Dose 81 MG Oral   
Tablet Delayed   
Release TAKE 1   
TABLET DAILY.   
Quantity: 0   
Refills: 0   
Ordered:   
15-Dec-2022 DO   
End : 15-Dec-2022   
Complete  
  
  
  
                                          
   
                                          
   
                                          
   
                                          
   
                                          
   
                                          
   
                                          
   
                                          
   
                                          
   
                                          
   
                                          
   
                                          
   
                                          
   
                                          
   
                                          
   
                                          
   
                                          
   
                                          
   
                                          
   
                                          
   
                                          
   
                                          
   
                                          
   
                                          
   
                                          
   
                                          
   
                                          
   
                                          
   
                                          
   
                                          
   
                                          
   
                                          
   
                                          
   
                                          
   
                                          
   
                                          
   
                                          
   
                                          
   
                                          
   
                                          
   
                                          
   
                                          
   
                                          
   
                                          
   
                                          
   
                                          
   
                                          
   
                                          
   
                                          
  
  
  
Problems  
Active Problems  
  
  
                      Problem Classification Problem    Date       Documented Da  
te Episodic/Chronic  
   
                                                    Acquired foot   
deformities  
(18 sources)                            Hammer toe;   
Translations: [Other   
hammer toe(s)   
(acquired), right   
foot]                                   Onset:   
  
3                         2023                Chronic  
   
                                                    Acquired foot   
deformities  
(5 sources)                             Bunion; Translations:   
[Bunion of right foot]                  Onset:   
  
3                         2023                Episodic  
   
                                                    Administrative/social   
admission  
(14 sources)                            Advance directive   
discussed with   
patient; Translations:   
[Other specified   
counseling]                                                 Episodic  
   
                                                    Allergic reactions  
(2 sources)                             Food allergy;   
Translations: [Allergy   
to other foods]                                             Episodic  
   
                                                    Anal and rectal   
conditions  
(2 sources)                             Rectal pain;   
Translations: [Anal or   
rectal pain]                                                Episodic  
   
                                                    Asthma  
(20 sources)                            Asthma; Translations:   
[Asthma, unspecified   
type, unspecified]                      Onset:   
  
3                         2023                Chronic  
   
                                                    Biliary tract disease  
(4 sources)                             Calculus of   
gallbladder without   
cholecystitis without   
obstruction;   
Translations:   
[Calculus of   
gallbladder with   
chronic cholecystitis   
without obstruction]                    Onset:   
  
3                                                   Episodic  
   
                                                    Cancer; other and   
unspecified primary  
(20 sources)                            Personal history of   
malignant neoplasm of   
other organs and   
systems; Translations:   
[History of squamous   
cell carcinoma]                                             Episodic  
   
                                                    Complications of   
surgical procedures or   
medical care  
(1 source)                              Post-surgical   
malabsorption;   
Translations: [Other   
and unspecified   
postsurgical   
nonabsorption]                                              Chronic  
   
                                                    Disorders of lipid   
metabolism  
(20 sources)                            Hyperlipidemia;   
Translations: [Other   
and unspecified   
hyperlipidemia]                         Onset:   
  
3                         2023                Chronic  
   
                                                    Esophageal disorders  
(20 sources)                            Gastro-esophageal   
reflux disease with   
esophagitis;   
Translations:   
[Gastroesophageal   
reflux disease]                         Onset:   
  
2  
Resolved:   
  
0                         10-                Chronic  
   
                                                    Esophageal disorders  
(10 sources)                            Esophagitis;   
Translations:   
[Esophagitis,   
unspecified]                                                Episodic  
   
                                                    Essential hypertension  
(20 sources)                            Hypertensive disorder;   
Translations: [Benign   
essential   
hypertension]                           Onset:   
  
2                         2023                Chronic  
   
                                                    Gastritis and   
duodenitis  
(20 sources)                            Chronic gastritis;   
Translations:   
[Atrophic gastritis,   
without mention of   
hemorrhage]                             Onset:   
  
3                         2023                Chronic  
   
                                                    Gastritis and   
duodenitis  
(17 sources)                            Gastritis;   
Translations:   
[Unspecified gastritis   
and gastroduodenitis,   
without mention of   
hemorrhage]                                                 Episodic  
   
                                                    Genitourinary symptoms   
and ill-defined   
conditions  
(2 sources)                             Increased frequency of   
urination;   
Translations: [Urinary   
frequency]                                                  Episodic  
   
                                                    Immunizations and   
screening for   
infectious disease  
(1 source)                              Encounter for   
immunization;   
Translations:   
[Encounter for   
immunization]                           Onset:   
  
3                                                   Episodic  
   
                                                    Malaise and fatigue  
(2 sources)                             Fatigue; Translations:   
[Chronic fatigue,   
unspecified]                            2023          Chronic  
   
                                                    Mycoses  
(1 source)                              Candidiasis of mouth;   
Translations: [Thrush,   
oral]                                                       Episodic  
   
                                                    Noninfectious   
gastroenteritis  
(20 sources)                            Chronic diarrhea;   
Translations:   
[Diarrhea]                              Onset:   
  
3  
Resolved:   
  
3                         2023                Episodic  
   
                                                    Nutritional   
deficiencies  
(10 sources)                            Iron deficiency;   
Translations: [Iron   
deficiency anemia,   
unspecified]                                                Episodic  
   
                                                    Osteoarthritis  
(20 sources)                            Primary osteoarthritis   
of ankle;   
Translations:   
[Osteoarthrosis,   
localized, primary,   
ankle and foot]                         Onset:   
  
3                         2023                Chronic  
   
                                                    Other acquired   
deformities  
(1 source)                              Spondylolisthesis,   
grade 1; Translations:   
[Acquired   
spondylolisthesis]                                          Episodic  
   
                                                    Other aftercare  
(1 source)                              Other long term   
(current) drug   
therapy; Translations:   
[Other long term   
(current) drug   
therapy]                                Onset:   
  
3                                                   Episodic  
   
                                                    Other connective   
tissue disease  
(14 sources)                            Spasm; Translations:   
[Spasm of muscle]                                           Episodic  
   
                                                    Other connective   
tissue disease  
(15 sources)                            Foot pain;   
Translations: [Pain in   
limb]                                                       Episodic  
   
                                                    Other connective   
tissue disease  
(1 source)                              Trigger finger,   
unspecified finger;   
Translations: [Trigger   
finger, unspecified   
finger]                                 Onset:   
  
3                                                   Episodic  
   
                                                    Other connective   
tissue disease  
(1 source)                              Trigger finger, right   
middle finger;   
Translations: [Trigger   
finger, right middle   
finger]                                 Onset:   
  
3                                                   Episodic  
   
                                                    Other ear and sense   
organ disorders  
(1 source)                              Ear pressure   
sensation;   
Translations:   
[Pressure sensation in   
both ears]                                                  Episodic  
   
                                                    Other endocrine   
disorders  
(1 source)                              Reactive hypoglycemia;   
Translations:   
[Reactive   
hypoglycemia]                                               Chronic  
   
                                                    Other endocrine   
disorders  
(5 sources)                             Hypoglycemia;   
Translations:   
[Hypoglycemia]                                              Chronic  
   
                                                    Other gastrointestinal   
disorders  
(7 sources)                             Irritable bowel   
syndrome;   
Translations: [Mixed   
irritable bowel   
syndrome]                               Onset:   
  
3                         2023                Chronic  
   
                                                    Other gastrointestinal   
disorders  
(20 sources)                            History of bariatric   
surgical procedure;   
Translations:   
[Bariatric surgery   
status]                                 Onset:   
  
3                         2023                Episodic  
   
                                                    Other gastrointestinal   
disorders  
(4 sources)                             Dysphagia;   
Translations:   
[Dysphagia]                                                 Episodic  
   
                                                    Other gastrointestinal   
disorders  
(3 sources)                             H/O: gastrointestinal   
disease; Translations:   
[History of dysphagia]                                         Episodic  
   
                                                    Other gastrointestinal   
disorders  
(20 sources)                            Chronic constipation;   
Translations:   
[Constipation,   
unspecified]                                                Episodic  
   
                                                    Other gastrointestinal   
disorders  
(6 sources)                             History of lower   
gastrointestinal   
bleed; Translations:   
[Personal history of   
other diseases of   
digestive system]                                           Episodic  
   
                                                    Other gastrointestinal   
disorders  
(1 source)                              Dysphagia,   
unspecified;   
Translations:   
[Dysphagia,   
unspecified]                            Onset:   
  
3                                                   Episodic  
   
                                                    Other hereditary and   
degenerative nervous   
system conditions  
(6 sources)                             Idiopathic peripheral   
autonomic neuropathy;   
Translations: [Other   
idiopathic peripheral   
autonomic neuropathy]                   Onset:   
  
3                         2023                Chronic  
   
                                                    Other hereditary and   
degenerative nervous   
system conditions  
(3 sources)                             Other idiopathic   
peripheral autonomic   
neuropathy;   
Translations: [Other   
idiopathic peripheral   
autonomic neuropathy]                   Onset:   
  
3                                                   Chronic  
   
                                                    Other inflammatory   
condition of skin  
(2 sources)                             Pruritus ani;   
Translations:   
[Pruritus ani]                                              Episodic  
   
                                                    Other injuries and   
conditions due to   
external causes  
(1 source)                              H/O: injury;   
Translations:   
[Personal history of   
other (healed)   
physical injury and   
trauma]                                 10-          Episodic  
   
                                                    Other liver diseases  
(20 sources)                            Steatosis of liver;   
Translations: [Other   
chronic nonalcoholic   
liver disease]                          Onset:   
  
3                         2023                Chronic  
   
                                                    Other liver diseases  
(1 source)                              Fatty (change of)   
liver, not elsewhere   
classified;   
Translations: [Fatty   
(change of) liver, not   
elsewhere classified]                   Onset:   
  
3                                                   Chronic  
   
                                                    Other lower   
respiratory disease  
(5 sources)                             Cough; Translations:   
[Cough]                                                     Episodic  
   
                                                    Other lower   
respiratory disease  
(3 sources)                             Dyspnea on exertion;   
Translations:   
[Shortness of breath]                                         Episodic  
   
                                                    Other nervous system   
disorders  
(2 sources)                             Polyneuropathy,   
unspecified;   
Translations:   
[Polyneuropathy,   
unspecified]                            Onset:   
  
8                                                   Chronic  
   
                                                    Other nervous system   
disorders  
(20 sources)                            Mononeuropathy of   
lower limb;   
Translations: [Other   
mononeuritis of lower   
limb]                                   Onset:   
  
3                         2023                Chronic  
   
                                                    Other nervous system   
disorders  
(20 sources)                            Numbness;   
Translations:   
[Disturbance of skin   
sensation]                              04-          Episodic  
   
                                                    Other nervous system   
disorders  
(15 sources)                            Numbness of foot ;   
Translations:   
[Disturbance of skin   
sensation]                                                  Episodic  
   
                                                    Other nutritional;   
endocrine; and   
metabolic disorders  
(20 sources)                            Disorder of   
carbohydrate   
metabolism;   
Translations:   
[Intestinal   
disaccharidase   
deficiencies and   
disaccharide   
malabsorption]                          Onset:   
  
3                         2023                Chronic  
   
                                                    Other nutritional;   
endocrine; and   
metabolic disorders  
(20 sources)                            Obesity; Translations:   
[Obesity, unspecified]                                         Chronic  
   
                                                    Other nutritional;   
endocrine; and   
metabolic disorders  
(1 source)                              Obesity, unspecified;   
Translations:   
[Obesity, unspecified]                  Onset:   
  
3                                                   Chronic  
   
                                                    Other nutritional;   
endocrine; and   
metabolic disorders  
(1 source)                              Body mass index (BMI)   
30.0-30.9, adult;   
Translations: [Body   
mass index [BMI]   
30.0-30.9, adult]                       Onset:   
  
3                                                   Chronic  
   
                                                    Other nutritional;   
endocrine; and   
metabolic disorders  
(2 sources)                             Other disorders of   
intestinal   
carbohydrate   
absorption;   
Translations: [Other   
disorders of   
intestinal   
carbohydrate   
absorption]                             Onset:   
  
3                                                   Chronic  
   
                                                    Other nutritional;   
endocrine; and   
metabolic disorders  
(1 source)                              Difficulty chewing;   
Translations: [Chewing   
difficulty]                                                 Episodic  
   
                                                    Other nutritional;   
endocrine; and   
metabolic disorders  
(2 sources)                             Decrease in appetite;   
Translations:   
[Anorexia]                              2023          Episodic  
   
                                                    Other nutritional;   
endocrine; and   
metabolic disorders  
(4 sources)                             Anorexia;   
Translations:   
[Anorexia]                              Onset:   
  
3                                                   Episodic  
   
                                                    Other upper   
respiratory disease  
(4 sources)                             Allergic rhinitis;   
Translations:   
[Allergic rhinitis,   
unspecified]                            10-          Chronic  
   
                                                    Other upper   
respiratory disease  
(3 sources)                             Rhinitis;   
Translations: [Chronic   
rhinitis]                                                   Chronic  
   
                                                    Other upper   
respiratory disease  
(8 sources)                             Nasal congestion;   
Translations: [Other   
disease of nasal   
cavity and sinuses]                                         Episodic  
   
                                                    Residual codes;   
unclassified  
(5 sources)                             H/O: surgery;   
Translations: [S/P   
laparoscopic sleeve   
gastrectomy]                                                Episodic  
   
                                                    Residual codes;   
unclassified  
(20 sources)                            Postmenopausal state;   
Translations:   
[Asymptomatic   
postmenopausal status   
(age-related)   
(natural)]                              10-          Episodic  
   
                                                    Residual codes;   
unclassified  
(5 sources)                             Acquired absence of   
other specified parts   
of digestive tract;   
Translations:   
[Acquired absence of   
other specified parts   
of digestive tract]                     Onset:   
  
2                                                   Episodic  
   
                                                    Residual codes;   
unclassified  
(5 sources)                             Asymptomatic   
menopausal state;   
Translations:   
[Asymptomatic   
menopausal state]                       Onset:   
10-  
2                                                   Episodic  
   
                                                    Screening and history   
of mental health and   
substance abuse codes  
(20 sources)                            Screening - NAD;   
Translations:   
[Screening for   
depression]                             Onset:   
  
3                                                   Episodic  
   
                                                    Spondylosis;   
intervertebral disc   
disorders; other back   
problems  
(15 sources)                            Radiculopathy, lumbar   
region; Translations:   
[Radiculopathy,   
lumbosacral region]                     Onset:   
  
8                         2023                Episodic  
   
                                                    Thyroid disorders  
(20 sources)                            Non-toxic multinodular   
goiter; Translations:   
[Thyroid nodule]                        Onset:   
  
3                         2023                Chronic  
   
                                                    Unclassified  
(2 sources)                             History of COVID-19;   
Translations: [History   
of COVID-19]                            Onset:   
  
3                         2023                  
   
                                                    Unclassified  
(1 source)                              Contact with and   
(suspected) exposure   
to COVID-19;   
Translations: [Contact   
with and (suspected)   
exposure to COVID-19]                   Onset:   
  
3                                                     
   
                                                    Unclassified  
(1 source)                              Gastro-esophageal   
reflux dis with   
esophagitis, without   
bleed; Translations:   
[Gastro-esophageal   
reflux dis with   
esophagitis, without   
bleed]                                  Onset:   
  
3                                                     
   
                                                    Urinary tract   
infections  
(5 sources)                             Acute cystitis;   
Translations: [Acute   
cystitis]                                                   Episodic  
   
                                                    Viral infection  
(3 sources)                             Disease caused by   
2019-nCoV;   
Translations: [Other   
specified viral   
infection]                                                  Episodic  
  
  
Past or Other Problems  
  
  
                      Problem Classification Problem    Date       Documented Da  
te Episodic/Chronic  
   
                                                    Abdominal hernia  
(20 sources)                            Hiatal hernia;   
Translations:   
[Diaphragmatic   
hernia without   
mention of   
obstruction or   
gangrene]                               Onset:   
2023                Episodic  
   
                                                    Abdominal pain  
(20 sources)                            Left lower   
quadrant pain;   
Translations:   
[Abdominal pain,   
left lower   
quadrant]                               Onset:   
10-                02-                Episodic  
   
                                                    Administrative/social   
admission  
(7 sources)                             Patient entered   
into trial;   
Translations:   
[Research study   
patient]                                                      
   
                                                    Anxiety disorders  
(20 sources)                            Anxiety state;   
Translations:   
[Anxiety state,   
unspecified]                            Onset:   
2023  
Resolved:   
10-                02-                Chronic  
   
                                                    Deficiency and other   
anemia  
(20 sources)                            Anemia;   
Translations:   
[Anemia,   
unspecified]                            Onset:   
2023                Episodic  
   
                                                    Deficiency and other   
anemia  
(19 sources)                            Iron deficiency   
anemia;   
Translations:   
[Iron deficiency   
anemia,   
unspecified]                            Onset:   
2023                Episodic  
   
                                                    Diabetes mellitus   
without complication  
(10 sources)                            Impaired fasting   
glycemia;   
Translations:   
[Impaired fasting   
glucose]                                Onset:   
2023                Episodic  
   
                                                    Gastrointestinal   
hemorrhage  
(14 sources)                            Hematochezia;   
Translations:   
[Blood in stool]                        Onset:   
2022  
Resolved:   
2023                Episodic  
   
                                                    Headache; including   
migraine  
(20 sources)                            Headache;   
Translations:   
[Headache]                              Onset:   
2023                Episodic  
   
                                                    Hemorrhoids  
(13 sources)                            Internal   
hemorrhoids;   
Translations:   
[Internal   
hemorrhoids   
without mention of   
complication]                           Onset:   
2022                Episodic  
   
                                                    Malaise and fatigue  
(20 sources)                            Fatigue;   
Translations:   
[Other malaise and   
fatigue]                                Onset:   
2023                Episodic  
   
                                                    Other connective tissue   
disease  
(20 sources)                            Triggering of   
digit;   
Translations:   
[Trigger finger   
(acquired)]                             Onset:   
2023                Episodic  
   
                                                    Other connective tissue   
disease  
(6 sources)                             Pain in both feet;   
Translations:   
[Pain in right   
foot]                                   Onset:   
2023                Episodic  
   
                                                    Other connective tissue   
disease  
(2 sources)                             Pain in right   
foot;   
Translations:   
[Pain in right   
foot]                                   Onset:   
2023                                          Episodic  
   
                                                    Other connective tissue   
disease  
(2 sources)                             Pain in left foot;   
Translations:   
[Pain in left   
foot]                                   Onset:   
2023                                          Episodic  
   
                                                    Other eye disorders  
(20 sources)                            Excessive tear   
production;   
Translations:   
[Epiphora,   
unspecified as to   
cause]                                  Onset:   
2023  
Resolved:   
2023                Episodic  
   
                                                    Other gastrointestinal   
disorders  
(20 sources)                            Irritable bowel   
syndrome with   
diarrhea;   
Translations:   
[Irritable bowel   
syndrome]                               Onset:   
2023  
Resolved:   
2023                Chronic  
   
                                                    Other gastrointestinal   
disorders  
(20 sources)                            Abdominal   
bloating;   
Translations:   
[Flatulence,   
eructation, and   
gas pain]                               Onset:   
2023  
Resolved:   
2023                Episodic  
   
                                                    Other gastrointestinal   
disorders  
(20 sources)                            Personal history   
of other diseases   
of the digestive   
system;   
Translations:   
[History of   
dysphagia]                                
Resolved:   
2020                                          Episodic  
   
                                                    Other gastrointestinal   
disorders  
(5 sources)                             Bariatric surgery   
status;   
Translations:   
[Bariatric surgery   
status]                                 Onset:   
2022                                          Episodic  
   
                                                    Other gastrointestinal   
disorders  
(2 sources)                             Diarrhea,   
unspecified;   
Translations:   
[Diarrhea,   
unspecified]                            Onset:   
2022                                          Episodic  
   
                                                    Other gastrointestinal   
disorders  
(2 sources)                             Abdominal   
distension   
(gaseous);   
Translations:   
[Abdominal   
distension   
(gaseous)]                              Onset:   
2023                                          Episodic  
   
                                                    Other gastrointestinal   
disorders  
(2 sources)                             Constipation,   
unspecified;   
Translations:   
[Constipation,   
unspecified]                            Onset:   
2023                                          Episodic  
   
                                                    Other hematologic   
conditions  
(20 sources)                            Microcytosis;   
Translations:   
[Other abnormality   
of red blood   
cells]                                  Onset:   
2023                Episodic  
   
                                                    Other infections;   
including parasitic  
(20 sources)                            Personal history   
of other   
infectious and   
parasitic   
diseases;   
Translations:   
[History of   
COVID-19]                               Onset:   
2023                Episodic  
   
                                                    Other injuries and   
conditions due to   
external causes  
(4 sources)                             Personal history   
of other (healed)   
physical injury   
and trauma;   
Translations:   
[Personal history   
of other (healed)   
physical injury   
and trauma]                             Onset:   
10-                                          Episodic  
   
                                                    Other nervous system   
disorders  
(20 sources)                            Carpal tunnel   
syndrome;   
Translations:   
[Carpal tunnel   
syndrome]                                 
Resolved:   
2020                                          Chronic  
   
                                                    Other nervous system   
disorders  
(7 sources)                             Paresthesia of   
foot ;   
Translations:   
[Anesthesia of   
skin]                                   Onset:   
2023                                          Episodic  
   
                                                    Other nervous system   
disorders  
(4 sources)                             Anesthesia of   
skin;   
Translations:   
[Anesthesia of   
skin]                                   Onset:   
2023                                          Episodic  
   
                                                    Other nervous system   
disorders  
(2 sources)                             Paresthesia of   
skin;   
Translations:   
[Paresthesia of   
skin]                                   Onset:   
2023                                          Episodic  
   
                                                    Other non-traumatic   
joint disorders  
(20 sources)                            Finger joint   
stiff;   
Translations:   
[Stiffness of   
joint, not   
elsewhere   
classified, hand]                       Onset:   
2023                Episodic  
   
                                                    Other non-traumatic   
joint disorders  
(5 sources)                             Pain in elbow;   
Translations:   
[Pain in left   
elbow]                                  Onset:   
10-                10-                Episodic  
   
                                                    Other non-traumatic   
joint disorders  
(4 sources)                             Pain in left   
elbow;   
Translations:   
[Pain in left   
elbow]                                  Onset:   
10-                                          Episodic  
   
                                                    Other nutritional;   
endocrine; and   
metabolic disorders  
(20 sources)                            Hypoproteinemia;   
Translations:   
[Other disorders   
of plasma protein   
metabolism]                             Onset:   
2023  
Resolved:   
2023                Chronic  
   
                                                    Other nutritional;   
endocrine; and   
metabolic disorders  
(20 sources)                            H/O: endocrine   
disorder;   
Translations:   
[Personal history   
of other   
endocrine,   
metabolic, and   
immunity   
disorders]                              Onset:   
2023  
Resolved:   
2020                Episodic  
   
                                                    Other nutritional;   
endocrine; and   
metabolic disorders  
(20 sources)                            Weight loss;   
Translations:   
[Loss of weight]                        Onset:   
2023                Episodic  
   
                                                    Other screening for   
suspected conditions   
(not mental disorders   
or infectious disease)  
(20 sources)                            Patient encounter   
status;   
Translations:   
[Breast screening,   
unspecified]                            Onset:   
10-                10-                Episodic  
   
                                                    Residual codes;   
unclassified  
(20 sources)                            Past history of   
procedure;   
Translations:   
[Other specified   
personal history   
presenting hazards   
to health]                              Onset:   
2018                                          Episodic  
  
  
  
                                          
   
                                          
   
                                          
   
                                          
   
                                          
   
                                          
   
                                          
   
                                          
   
                                          
   
                                          
  
  
  
Results  
  
  
                      Test Name  Value      Interpretation Reference Range Facil  
ity  
  
  
  
                                          
   
                                          
   
                                          
   
                                          
   
                                          
   
                                          
   
                                          
   
                                          
   
                                          
   
                                          
   
                                          
   
                                          
   
                                          
   
                                          
   
                                          
   
                                          
   
                                          
   
                                          
   
                                          
   
                                          
   
                                          
   
                                          
   
                                          
   
                                          
   
                                          
   
                                          
   
                                          
   
                                          
   
                                          
   
                                          
   
                                          
   
                                          
   
                                          
   
                                          
   
                                          
   
                                          
   
                                          
   
                                          
   
                                          
   
                                          
   
                                          
   
                                          
   
                                          
   
                                          
   
                                          
   
                                          
   
                                          
   
                                          
   
                                          
   
                                          
   
                                          
   
                                          
   
                                          
   
                                          
   
                                          
   
                                          
   
                                          
   
                                          
   
                                          
   
                                          
   
                                          
   
                                          
   
                                          
   
                                          
   
                                          
   
                                          
   
                                          
   
                                          
   
                                          
   
                                          
   
                                          
   
                                          
   
                                          
   
                                          
   
                                          
   
                                          
   
                                          
   
                                          
   
                                          
   
                                          
   
                                          
   
                                          
   
                                          
   
                                          
   
                                          
   
                                          
   
                                          
   
                                          
   
                                          
   
                                          
   
                                          
   
                                          
   
                                          
   
                                          
   
                                          
   
                                          
   
                                          
   
                                          
   
                                          
   
                                          
   
                                          
   
                                          
   
                                          
   
                                          
   
                                          
   
                                          
   
                                          
   
                                          
   
                                          
   
                                          
   
                                          
   
                                          
   
                                          
   
                                          
   
                                          
   
                                          
   
                                          
   
                                          
   
                                          
   
                                          
   
                                          
   
                                          
   
                                          
   
                                          
   
                                          
   
                                          
   
                                          
   
                                          
   
                                          
   
                                          
   
                                          
   
                                          
   
                                          
   
                                          
   
                                          
   
                                          
   
                                          
   
                                          
   
                                          
   
                                          
   
                                          
   
                                          
   
                                          
   
                                          
   
                                          
   
                                          
   
                                          
   
                                          
   
                                          
   
                                          
   
                                          
   
                                          
   
                                          
   
                                          
   
                                          
   
                                          
   
                                          
   
                                          
   
                                          
   
                                          
   
                                          
   
                                          
   
                                          
   
                                          
   
                                          
   
                                          
   
                                          
   
                                          
   
                                          
   
                                          
   
                                          
   
                                          
   
                                          
   
                                          
   
                                          
   
                                          
   
                                          
   
                                          
   
                                          
   
                                          
   
                                          
   
                                          
   
                                          
   
                                          
   
                                          
   
                                          
   
                                          
   
                                          
   
                                          
   
                                          
   
                                          
   
                                          
   
                                          
   
                                          
   
                                          
   
                                          
   
                                          
   
                                          
   
                                          
   
                                          
   
                                          
   
                                          
   
                                          
   
                                          
   
                                          
   
                                          
   
                                          
   
                                          
   
                                          
   
                                          
   
                                          
   
                                          
   
                                          
   
                                          
   
                                          
   
                                          
   
                                          
   
                                          
   
                                          
   
                                          
   
                                          
   
                                          
   
                                          
   
                                          
   
                                          
   
                                          
   
                                          
   
                                          
   
                                          
   
                                          
   
                                          
   
                                          
   
                                          
   
                                          
   
                                          
   
                                          
   
                                          
   
                                          
   
                                          
   
                                          
   
                                          
   
                                          
   
                                          
   
                                          
   
                                          
   
                                          
   
                                          
   
                                          
   
                                          
   
                                          
   
                                          
   
                                          
   
                                          
   
                                          
   
                                          
   
                                          
   
                                          
   
                                          
   
                                          
   
                                          
   
                                          
   
                                          
   
                                          
   
                                          
   
                                          
   
                                          
   
                                          
   
                                          
   
                                          
   
                                          
   
                                          
   
                                          
   
                                          
   
                                          
   
                                          
   
                                          
   
                                          
   
                                          
   
                                          
   
                                          
   
                                          
   
                                          
   
                                          
   
                                          
   
                                          
   
                                          
   
                                          
   
                                          
   
                                          
   
                                          
   
                                          
   
                                          
   
                                          
   
                                          
   
                                          
   
                                          
   
                                          
   
                                          
   
                                          
   
                                          
   
                                          
   
                                          
   
                                          
   
                                          
   
                                          
   
                                          
   
                                          
   
                                          
   
                                          
   
                                          
   
                                          
   
                                          
   
                                          
   
                                          
   
                                          
   
                                          
   
                                          
   
                                          
   
                                          
   
                                          
   
                                          
   
                                          
   
                                          
   
                                          
   
                                          
   
                                          
   
                                          
   
                                          
   
                                          
   
                                          
   
                                          
   
                                          
   
                                          
   
                                          
   
                                          
   
                                          
   
                                          
   
                                          
   
                                          
   
                                          
   
                                          
   
                                          
   
                                          
   
                                          
   
                                          
   
                                          
   
                                          
   
                                          
   
                                          
   
                                          
   
                                          
   
                                          
   
                                          
   
                                          
   
                                          
   
                                          
   
                                          
   
                                          
   
                                          
   
                                          
   
                                          
   
                                          
   
                                          
   
                                          
   
                                          
   
                                          
   
                                          
   
                                          
   
                                          
   
                                          
   
                                          
   
                                          
   
                                          
   
                                          
   
                                          
   
                                          
   
                                          
   
                                          
   
                                          
   
                                          
   
                                          
   
                                          
   
                                          
   
                                          
   
                                          
   
                                          
   
                                          
   
                                          
   
                                          
   
                                          
   
                                          
   
                                          
   
                                          
   
                                          
   
                                          
   
                                          
   
                                          
   
                                          
   
                                          
   
                                          
   
                                          
   
                                          
   
                                          
   
                                          
   
                                          
   
                                          
   
                                          
   
                                          
   
                                          
   
                                          
   
                                          
   
                                          
   
                                          
   
                                          
   
                                          
   
                                          
   
                                          
   
                                          
   
                                          
   
                                          
   
                                          
   
                                          
   
                                          
   
                                          
   
                                          
   
                                          
   
                                          
   
                                          
   
                                          
   
                                          
   
                                          
   
                                          
   
                                          
   
                                          
   
                                          
   
                                          
   
                                          
   
                                          
   
                                          
   
                                          
   
                                          
   
                                          
   
                                          
   
                                          
   
                                          
   
                                          
   
                                          
   
                                          
   
                                          
   
                                          
   
                                          
   
                                          
   
                                          
   
                                          
   
                                          
   
                                          
   
                                          
   
                                          
   
                                          
   
                                          
   
                                          
   
                                          
   
                                          
   
                                          
   
                                          
   
                                          
   
                                          
   
                                          
   
                                          
   
                                          
   
                                          
   
                                          
   
                                          
   
                                          
   
                                          
   
                                          
   
                                          
   
                                          
   
                                          
   
                                          
   
                                          
   
                                          
   
                                          
   
                                          
   
                                          
   
                                          
   
                                          
   
                                          
   
                                          
   
                                          
   
                                          
   
                                          
   
                                          
   
                                          
   
                                          
   
                                          
   
                                          
   
                                          
   
                                          
   
                                          
   
                                          
   
                                          
   
                                          
   
                                          
   
                                          
   
                                          
   
                                          
   
                                          
   
                                          
   
                                          
   
                                          
   
                                          
   
                                          
   
                                          
   
                                          
   
                                          
   
                                          
   
                                          
   
                                          
   
                                          
   
                                          
   
                                          
   
                                          
   
                                          
   
                                          
   
                                          
   
                                          
   
                                          
   
                                          
   
                                          
   
                                          
   
                                          
   
                                          
   
                                          
   
                                          
   
                                          
   
                                          
   
                                          
   
                                          
   
                                          
   
                                          
   
                                          
   
                                          
   
                                          
   
                                          
   
                                          
   
                                          
   
                                          
   
                                          
   
                                          
   
                                          
   
                                          
   
                                          
   
                                          
   
                                          
   
                                          
   
                                          
   
                                          
   
                                          
   
                                          
   
                                          
   
                                          
   
                                          
   
                                          
   
                                          
   
                                          
   
                                          
   
                                          
   
                                          
   
                                          
   
                                          
   
                                          
   
                                          
   
                                          
   
                                          
   
                                          
   
                                          
   
                                          
   
                                          
   
                                          
   
                                          
   
                                          
   
                                          
   
                                          
   
                                          
   
                                          
   
                                          
   
                                          
   
                                          
   
                                          
   
                                          
   
                                          
   
                                          
   
                                          
   
                                          
   
                                          
   
                                          
   
                                          
   
                                          
   
                                          
   
                                          
   
                                          
   
                                          
   
                                          
   
                                          
   
                                          
   
                                          
   
                                          
   
                                          
   
                                          
   
                                          
   
                                          
   
                                          
   
                                          
   
                                          
   
                                          
   
                                          
   
                                          
   
                                          
   
                                          
   
                                          
   
                                          
   
                                          
   
                                          
   
                                          
   
                                          
   
                                          
   
                                          
   
                                          
   
                                          
   
                                          
   
                                          
   
                                          
   
                                          
   
                                          
   
                                          
   
                                          
   
                                          
   
                                          
   
                                          
   
                                          
   
                                          
   
                                          
   
                                          
   
                                          
   
                                          
   
                                          
   
                                          
   
                                          
   
                                          
   
                                          
   
                                          
   
                                          
   
                                          
   
                                          
   
                                          
   
                                          
   
                                          
   
                                          
   
                                          
   
                                          
   
                                          
   
                                          
   
                                          
   
                                          
   
                                          
   
                                          
   
                                          
   
                                          
   
                                          
   
                                          
   
                                          
   
                                          
   
                                          
   
                                          
   
                                          
   
                                          
   
                                          
   
                                          
   
                                          
   
                                          
   
                                          
   
                                          
   
                                          
   
                                          
   
                                          
   
                                          
   
                                          
   
                                          
   
                                          
   
                                          
   
                                          
   
                                          
   
                                          
   
                                          
   
                                          
   
                                          
   
                                          
   
                                          
   
                                          
   
                                          
   
                                          
   
                                          
   
                                          
   
                                          
   
                                          
   
                                          
   
                                          
   
                                          
   
                                          
   
                                          
   
                                          
   
                                          
   
                                          
   
                                          
   
                                          
   
                                          
   
                                          
   
                                          
   
                                          
   
                                          
   
                                          
   
                                          
   
                                          
   
                                          
   
                                          
   
                                          
   
                                          
   
                                          
   
                                          
   
                                          
   
                                          
   
                                          
   
                                          
   
                                          
   
                                          
   
                                          
   
                                          
   
                                          
   
                                          
   
                                          
   
                                          
   
                                          
   
                                          
   
                                          
   
                                          
   
                                          
   
                                          
   
                                          
   
                                          
   
                                          
   
                                          
   
                                          
   
                                          
   
                                          
   
                                          
   
                                          
   
                                          
   
                                          
   
                                          
   
                                          
   
                                          
   
                                          
   
                                          
   
                                          
   
                                          
   
                                          
   
                                          
   
                                          
   
                                          
   
                                          
   
                                          
   
                                          
   
                                          
   
                                          
   
                                          
   
                                          
   
                                          
   
                                          
   
                                          
   
                                          
   
                                          
   
                                          
   
                                          
   
                                          
   
                                          
   
                                          
   
                                          
   
                                          
   
                                          
   
                                          
   
                                          
   
                                          
   
                                          
   
                                          
   
                                          
   
                                          
   
                                          
   
                                          
   
                                          
   
                                          
   
                                          
   
                                          
   
                                          
   
                                          
   
                                          
   
                                          
   
                                          
   
                                          
   
                                          
   
                                          
   
                                          
   
                                          
   
                                          
   
                                          
   
                                          
   
                                          
   
                                          
   
                                          
   
                                          
   
                                          
   
                                          
   
                                          
   
                                          
   
                                          
   
                                          
   
                                          
   
                                          
   
                                          
   
                                          
   
                                          
   
                                          
   
                                          
   
                                          
   
                                          
   
                                          
   
                                          
   
                                          
   
                                          
   
                                          
   
                                          
   
                                          
   
                                          
   
                                          
   
                                          
   
                                          
   
                                          
   
                                          
   
                                          
   
                                          
   
                                          
   
                                          
   
                                          
   
                                          
   
                                          
   
                                          
   
                                          
   
                                          
   
                                          
   
                                          
   
                                          
   
                                          
   
                                          
   
                                          
   
                                          
   
                                          
   
                                          
   
                                          
   
                                          
   
                                          
   
                                          
   
                                          
   
                                          
   
                                          
   
                                          
   
                                          
   
                                          
   
                                          
   
                                          
   
                                          
   
                                          
   
                                          
   
                                          
   
                                          
   
                                          
   
                                          
   
                                          
   
                                          
   
                                          
   
                                          
   
                                          
   
                                          
   
                                          
   
                                          
   
                                          
   
                                          
   
                                          
   
                                          
   
                                          
   
                                          
   
                                          
   
                                          
   
                                          
   
                                          
   
                                          
   
                                          
   
                                          
   
                                          
   
                                          
   
                                          
   
                                          
   
                                          
   
                                          
   
                                          
   
                                          
   
                                          
   
                                          
   
                                          
   
                                          
   
                                          
   
                                          
   
                                          
   
                                          
   
                                          
   
                                          
   
                                          
   
                                          
   
                                          
   
                                          
   
                                          
   
                                          
   
                                          
   
                                          
   
                                          
   
                                          
   
                                          
   
                                          
   
                                          
   
                                          
   
                                          
   
                                          
   
                                          
   
                                          
   
                                          
   
                                          
   
                                          
   
                                          
   
                                          
   
                                          
   
                                          
   
                                          
   
                                          
   
                                          
   
                                          
   
                                          
   
                                          
   
                                          
   
                                          
   
                                          
   
                                          
   
                                          
   
                                          
   
                                          
   
                                          
   
                                          
   
                                          
   
                                          
   
                                          
   
                                          
   
                                          
   
                                          
   
                                          
   
                                          
   
                                          
   
                                          
   
                                          
   
                                          
   
                                          
   
                                          
   
                                          
   
                                          
   
                                          
   
                                          
   
                                          
   
                                          
   
                                          
   
                                          
   
                                          
   
                                          
   
                                          
   
                                          
   
                                          
   
                                          
   
                                          
   
                                          
   
                                          
   
                                          
   
                                          
   
                                          
   
                                          
   
                                          
   
                                          
   
                                          
   
                                          
   
                                          
   
                                          
   
                                          
   
                                          
   
                                          
   
                                          
   
                                          
   
                                          
   
                                          
   
                                          
   
                                          
   
                                          
   
                                          
   
                                          
   
                                          
   
                                          
   
                                          
   
                                          
   
                                          
   
                                          
   
                                          
   
                                          
   
                                          
   
                                          
   
                                          
   
                                          
   
                                          
   
                                          
   
                                          
   
                                          
   
                                          
   
                                          
   
                                          
   
                                          
   
                                          
   
                                          
   
                                          
   
                                          
   
                                          
   
                                          
   
                                          
   
                                          
   
                                          
   
                                          
   
                                          
   
                                          
   
                                          
   
                                          
   
                                          
   
                                          
   
                                          
   
                                          
   
                                          
   
                                          
   
                                          
   
                                          
   
                                          
   
                                          
   
                                          
   
                                          
   
                                          
   
                                          
   
                                          
   
                                          
   
                                          
   
                                          
   
                                          
   
                                          
   
                                          
   
                                          
   
                                          
   
                                          
   
                                          
   
                                          
   
                                          
   
                                          
   
                                          
   
                                          
   
                                          
   
                                          
   
                                          
   
                                          
   
                                          
   
                                          
   
                                          
   
                                          
   
                                          
   
                                          
   
                                          
   
                                          
   
                                          
   
                                          
   
                                          
   
                                          
   
                                          
   
                                          
   
                                          
   
                                          
   
                                          
   
                                          
   
                                          
   
                                          
   
                                          
   
                                          
   
                                          
   
                                          
   
                                          
   
                                          
   
                                          
   
                                          
   
                                          
   
                                          
   
                                          
   
                                          
   
                                          
   
                                          
   
                                          
   
                                          
   
                                          
   
                                          
   
                                          
   
                                          
   
                                          
   
                                          
   
                                          
   
                                          
   
                                          
   
                                          
   
                                          
   
                                          
   
                                          
   
                                          
   
                                          
   
                                          
   
                                          
   
                                          
   
                                          
   
                                          
   
                                          
   
                                          
   
                                          
   
                                          
   
                                          
   
                                          
   
                                          
   
                                          
   
                                          
   
                                          
   
                                          
   
                                          
   
                                          
   
                                          
   
                                          
   
                                          
   
                                          
   
                                          
   
                                          
   
                                          
   
                                          
   
                                          
   
                                          
   
                                          
   
                                          
   
                                          
   
                                          
   
                                          
   
                                          
   
                                          
   
                                          
   
                                          
   
                                          
   
                                          
   
                                          
   
                                          
   
                                          
   
                                          
   
                                          
   
                                          
   
                                          
   
                                          
   
                                          
   
                                          
   
                                          
   
                                          
   
                                          
   
                                          
   
                                          
   
                                          
   
                                          
   
                                          
   
                                          
   
                                          
   
                                          
   
                                          
   
                                          
   
                                          
   
                                          
   
                                          
   
                                          
   
                                          
   
                                          
   
                                          
   
                                          
   
                                          
   
                                          
   
                                          
   
                                          
   
                                          
   
                                          
   
                                          
   
                                          
   
                                          
   
                                          
   
                                          
   
                                          
   
                                          
   
                                          
   
                                          
   
                                          
   
                                          
   
                                          
   
                                          
   
                                          
   
                                          
   
                                          
   
                                          
   
                                          
   
                                          
   
                                          
   
                                          
   
                                          
   
                                          
   
                                          
   
                                          
   
                                          
   
                                          
   
                                          
   
                                          
   
                                          
   
                                          
   
                                          
   
                                          
   
                                          
   
                                          
   
                                          
   
                                          
   
                                          
   
                                          
   
                                          
   
                                          
   
                                          
   
                                          
   
                                          
   
                                          
   
                                          
   
                                          
   
                                          
   
                                          
   
                                          
   
                                          
   
                                          
   
                                          
   
                                          
   
                                          
   
                                          
   
                                          
   
                                          
   
                                          
   
                                          
   
                                          
   
                                          
   
                                          
   
                                          
   
                                          
   
                                          
   
                                          
   
                                          
   
                                          
   
                                          
   
                                          
   
                                          
   
                                          
   
                                          
   
                                          
   
                                          
   
                                          
   
                                          
   
                                          
   
                                          
   
                                          
   
                                          
   
                                          
   
                                          
   
                                          
   
                                          
   
                                          
   
                                          
   
                                          
   
                                          
   
                                          
   
                                          
   
                                          
   
                                          
   
                                          
   
                                          
   
                                          
   
                                          
   
                                          
   
                                          
   
                                          
   
                                          
   
                                          
   
                                          
   
                                          
   
                                          
   
                                          
   
                                          
   
                                          
   
                                          
   
                                          
   
                                          
   
                                          
   
                                          
   
                                          
   
                                          
   
                                          
   
                                          
   
                                          
   
                                          
   
                                          
   
                                          
   
                                          
   
                                          
   
                                          
   
                                          
   
                                          
   
                                          
   
                                          
   
                                          
   
                                          
   
                                          
   
                                          
   
                                          
   
                                          
   
                                          
   
                                          
   
                                          
   
                                          
   
                                          
   
                                          
   
                                          
   
                                          
   
                                          
   
                                          
   
                                          
   
                                          
   
                                          
   
                                          
   
                                          
   
                                          
   
                                          
   
                                          
   
                                          
   
                                          
   
                                          
   
                                          
   
                                          
   
                                          
   
                                          
   
                                          
   
                                          
   
                                          
   
                                          
   
                                          
   
                                          
   
                                          
   
                                          
   
                                          
   
                                          
   
                                          
   
                                          
   
                                          
   
                                          
   
                                          
   
                                          
   
                                          
   
                                          
   
                                          
   
                                          
   
                                          
   
                                          
   
                                          
   
                                          
   
                                          
   
                                          
   
                                          
   
                                          
   
                                          
   
                                          
   
                                          
   
                                          
   
                                          
   
                                          
   
                                          
   
                                          
   
                                          
   
                                          
   
                                          
   
                                          
   
                                          
   
                                          
   
                                          
   
                                          
   
                                          
   
                                          
   
                                          
   
                                          
   
                                          
   
                                          
   
                                          
   
                                          
   
                                          
   
                                          
   
                                          
  
  
  
Vital Signs  
  
  
                          Date Time    Vital Sign   Value        Performing   
Clinician                               Facility  
   
                                                    2024   
11:                              Diastolic blood   
pressure            76 mm[Hg]           [address]  
   
                                                    2024   
11:      Heart rate      62 /min         [address]  
   
                                                    2024   
11:      Respiratory rate 20 /min         [address]  
   
                                                    2024   
11:                              SaO2% (BldA) [Mass   
fraction]           100 %               [address]  
   
                                                    2024   
11:                              Systolic blood   
pressure            124 mm[Hg]          [address]  
   
                                                    2024   
10:      Body temperature 97.59 [degF]    [address]  
   
                                                    2024   
09:      Body height     163 cm          [address]  
   
                                                    2024   
09:                              Body mass index   
(BMI) [Ratio]       28.12 kg/m2         [address]  
   
                                                    2024   
09:      Body weight     74.7 kg         [facility]          [facility]  
   
                                                    2024   
12:                              Diastolic blood   
pressure                  72 mm[Hg]                 Padmini Valiente MD  
Work Phone:   
[phone]                          [facility]  
   
                                                    2024   
12:          Heart rate          62 /min             Padmini Valiente MD  
Work Phone:   
[phone]                          [facility]  
   
                                                    2024   
12:          Respiratory rate    16 /min             Padmini Valiente MD  
Work Phone:   
[phone]                          [facility]  
   
                                                    2024   
12:                              SaO2% (BldA) [Mass   
fraction]                 96 %                      Padmini Valiente MD  
Work Phone:   
[phone]                          [facility]  
   
                                                    2024   
12:                              Systolic blood   
pressure                  116 mm[Hg]                Padmini Valiente MD  
Work Phone:   
[phone]                          [facility]  
   
                                                    2024   
12:          Body temperature    97.5 [degF]         Padmini Valiente MD  
Work Phone:   
[phone]                          [facility]  
   
                                                    2024   
10:          Body height         162.2 cm            Padmini Valiente MD  
Work Phone:   
[phone]                          [facility]  
   
                                                    2024   
10:                              Body mass index   
(BMI) [Ratio]             27.75 kg/m2               Padmini Valiente MD  
Work Phone:   
[phone]                          [facility]  
   
                                                    2024   
10:          Body weight         73 kg               Padmini Valiente MD  
Work Phone:   
[phone]                          [facility]  
   
                                                    2024   
08:          Body temperature    98.2 [degF]         Jose Maria Gonsalez DPM  
Work Phone:   
[phone]                          [facility]  
   
                                                    2024   
08:                              Diastolic blood   
pressure                  83 mm[Hg]                 Jose Maria Gonsalez DPM  
Work Phone:   
[phone]                          [facility]  
   
                                                    2024   
08:          Heart rate          98 /min             Jose Maria Gonsalez DPM  
Work Phone:   
[phone]                          [facility]  
   
                                                    2024   
08:                              Systolic blood   
pressure                  122 mm[Hg]                Jose Maria Gonsalez DPM  
Work Phone:   
[phone]                          [facility]  
   
                                                    2023   
10:          Body temperature    97.9 [degF]         Jose Maria Gonsalez DPM  
Work Phone:   
[phone]                          [facility]  
   
                                                    2023   
10:                              Diastolic blood   
pressure                  78 mm[Hg]                 Jose Maria Gonsalez DPM  
Work Phone:   
[phone]                          [facility]  
   
                                                    2023   
10:          Heart rate          62 /min             Jose Maria Gonsalez DPM  
Work Phone:   
[phone]                          [facility]  
   
                                                    2023   
10:                              Systolic blood   
pressure                  123 mm[Hg]                Jose Maria Gonsalez DPM  
Work Phone:   
[phone]                          [facility]  
   
                                                    2023   
09:          Body height         160 cm              Yumiko Smart MD  
Work Phone:   
[phone]                          [facility]  
   
                                                    2023   
09:                              Body mass index   
(BMI) [Ratio]             29.23 kg/m2               Yumiko Smart MD  
Work Phone:   
[phone]                          [facility]  
   
                                                    2023   
09:          Body weight         74.84 kg            Yumiko Smart MD  
Work Phone:   
[phone]                          [facility]  
   
                                                    2023   
09:                              Diastolic blood   
pressure                  72 mm[Hg]                 Yumiko Smart MD  
Work Phone:   
[phone]                          [facility]  
   
                                                    2023   
09:          Heart rate          60 /min             Yumiko Smart MD  
Work Phone:   
[phone]                          [facility]  
   
                                                    2023   
09:                              Systolic blood   
pressure                  124 mm[Hg]                Yumiko Smart MD  
Work Phone:   
[phone]                          [facility]  
   
                                                    10-   
09:                              Diastolic blood   
pressure                  82 mm[Hg]                 Jose Maria Gonsalez DPM  
Work Phone:   
[phone]                          [facility]  
   
                                                    10-   
09:          Heart rate          59 /min             Jose Maria Gonsalez DPM  
Work Phone:   
[phone]                          [facility]  
   
                                                    10-   
09:                              Systolic blood   
pressure                  132 mm[Hg]                Jose Maria Gonsalez DPM  
Work Phone:   
[phone]                          [facility]  
   
                                                    10-   
09:          Body temperature    98.1 [degF]         Jose Maria Gonsalez DPM  
Work Phone:   
[phone]                          [facility]  
   
                                                    10-   
08:          Body height         160 cm              Yumiko Smart MD  
Work Phone:   
[phone]                          [facility]  
   
                                                    10-   
08:                              Body mass index   
(BMI) [Ratio]             29.41 kg/m2               Yumiko Smart MD  
Work Phone:   
[phone]                          [facility]  
   
                                                    10-   
08:          Body weight         75.3 kg             Yumiko Smart MD  
Work Phone:   
[phone]                          [facility]  
   
                                                    10-   
08:                              Diastolic blood   
pressure                  80 mm[Hg]                 Yumiko Smart MD  
Work Phone:   
[phone]                          [facility]  
   
                                                    10-   
08:          Heart rate          66 /min             Yumiko Smart MD  
Work Phone:   
[phone]                          [facility]  
   
                                                    10-   
08:                              SaO2% (BldA) [Mass   
fraction]                 97 %                      Yumiko Smart MD  
Work Phone:   
[phone]                          [facility]  
   
                                                    10-   
08:                              Systolic blood   
pressure                  120 mm[Hg]                Yumiko Smart MD  
Work Phone:   
[phone]                          [facility]  
   
                                                    2023   
09:          Body temperature    97.7 [degF]         Jose Maria Gonsalez DPM  
Work Phone:   
[phone]                          [facility]  
   
                                                    2023   
09:                              Diastolic blood   
pressure                  88 mm[Hg]                 Jose Maria Gonsalez DPM  
Work Phone:   
[phone]                          [facility]  
   
                                                    2023   
09:          Heart rate          75 /min             Jose Maria Gonsalez DPM  
Work Phone:   
[phone]                          [facility]  
   
                                                    2023   
09:                              Systolic blood   
pressure                  133 mm[Hg]                Jose Maria Gonsalez DPM  
Work Phone:   
[phone]                          [facility]  
   
                                                    2023   
08:          Body height         160 cm              Yumiko Smart MD  
Work Phone:   
[phone]                          [facility]  
   
                                                    2023   
08:                              Body mass index   
(BMI) [Ratio]             30.29 kg/m2               Yumiko Smart MD  
Work Phone:   
[phone]                          [facility]  
   
                                                    2023   
08:          Body weight         77.56 kg            Yumiko Smart MD  
Work Phone:   
[phone]                          [facility]  
   
                                                    2023   
08:                              Diastolic blood   
pressure                  80 mm[Hg]                 Yumiko Smart MD  
Work Phone:   
[phone]                          [facility]  
   
                                                    2023   
08:          Heart rate          64 /min             Yumiko Smart MD  
Work Phone:   
[phone]                          [facility]  
   
                                                    2023   
08:                              Systolic blood   
pressure                  122 mm[Hg]                Yumiko Smart MD  
Work Phone:   
[phone]                          [facility]  
   
                                                    2023   
08:          Body height         160 cm              Yumiko Smart MD  
Work Phone:   
[phone]                          [facility]  
   
                                                    2023   
08:                              Body mass index   
(BMI) [Ratio]             31.18 kg/m2               Yumiko Smart MD  
Work Phone:   
[phone]                          [facility]  
   
                                                    2023   
08:          Body weight         79.83 kg            Yumiko Smart MD  
Work Phone:   
[phone]                          [facility]  
   
                                                    2023   
08:                              Diastolic blood   
pressure                  68 mm[Hg]                 Yumiko Smart MD  
Work Phone:   
[phone]                          [facility]  
   
                                                    2023   
08:          Heart rate          73 /min             Yumiko Smart MD  
Work Phone:   
[phone]                          [facility]  
   
                                                    2023   
08:                              SaO2% (BldA) [Mass   
fraction]                 98 %                      Yumiko Smart MD  
Work Phone:   
[phone]                          [facility]  
   
                                                    2023   
08:                              Systolic blood   
pressure                  112 mm[Hg]                Yumiko Smart MD  
Work Phone:   
[phone]                          [facility]  
   
                                                    2023   
10:          Body height         160.02 cm           Yumiko Smart  
Work Phone:   
[phone]                          [facility]  
Work Phone:   
[phone]  
   
                                                    2023   
10:                              Body mass index   
(BMI) [Ratio]             31.35 kg/m2               Yumiko Smart  
Work Phone:   
[phone]                          [facility]  
Work Phone:   
[phone]  
   
                                                    2023   
10:                              Body surface area   
Derived from formula      1.84 m2                   Yumiko Smart  
Work Phone:   
[phone]                          [facility]  
Work Phone:   
[phone]  
   
                                                    2023   
10:          Body weight         80.29 kg            Yumikomaite Smart  
Work Phone:   
[phone]                          [facility]  
Work Phone:   
[phone]  
   
                                                    2023   
10:                              Diastolic blood   
pressure                  68 mm[Hg]                 Yumiko Smart  
Work Phone:   
[phone]                          [facility]  
Work Phone:   
[phone]  
   
                                                    2023   
10:          Heart rate          60 /min             Yumiko Smart  
Work Phone:   
[phone]                          [facility]   
Internal Medicine  
Work Phone:   
[phone]  
   
                                                    2023   
10:                              SaO2% (BldA) [Mass   
fraction]                 98 %                      Yumiko Smart  
Work Phone:   
[phone]                          [facility]  
Work Phone:   
[phone]  
   
                                                    2023   
10:                              Systolic blood   
pressure                  120 mm[Hg]                Yumiko Smart  
Work Phone:   
[phone]                          [facility]   
Internal Medicine  
Work Phone:   
[phone]  
   
                                                    12-   
08:          Body height         160.02 cm           Yumiko Smart  
Work Phone:   
[phone]                          [facility]  
Work Phone:   
[phone]  
   
                                                    12-   
08:                              Body mass index   
(BMI) [Ratio]             31.89 kg/m2               Yumiko Smart  
Work Phone:   
[phone]                          [facility]  
Work Phone:   
[phone]  
   
                                                    12-   
08:                              Body surface area   
Derived from formula      1.85 m2                   Yumiko Smart  
Work Phone:   
[phone]                          [facility]  
Work Phone:   
[phone]  
   
                                                    12-   
08:          Body weight         81.65 kg            Yumiko Smart  
Work Phone:   
[phone]                          [facility]  
Work Phone:   
[phone]  
   
                                                    12-   
08:                              Diastolic blood   
pressure                  82 mm[Hg]                 Yumiko Macedoabi  
Work Phone:   
[phone]                          [facility]  
Work Phone:   
[phone]  
   
                                                    12-   
08:                              Diastolic blood   
pressure                  80 mm[Hg]                 Yumiko Macedoabi  
Work Phone:   
[phone]                          [facility]  
Work Phone:   
[phone]  
   
                                                    12-   
08:          Heart rate          68 /min             Yumiko Macedoabi  
Work Phone:   
[phone]                          [facility]  
Work Phone:   
[phone]  
   
                                                    12-   
08:                              Systolic blood   
pressure                  132 mm[Hg]                Yumiko Vidalrrabi  
Work Phone:   
[phone]                          [facility]  
Work Phone:   
[phone]  
   
                                                    12-   
08:                              Systolic blood   
pressure                  130 mm[Hg]                Yumiko Zarrabi  
Work Phone:   
[phone]                          [facility]   
Internal Medicine  
Work Phone:   
[phone]  
   
                                                    2022   
08:          Body height         160.02 cm           Yumiko Macedoabi  
Work Phone:   
[phone]                          [facility]  
Work Phone:   
[phone]  
   
                                                    2022   
08:                              Body mass index   
(BMI) [Ratio]             31.71 kg/m2               Yumiko Vidalrrabi  
Work Phone:   
[phone]                          [facility]  
Work Phone:   
[phone]  
   
                                                    2022   
08:                              Body surface area   
Derived from formula      1.84 m2                   Yumiko Macedoabi  
Work Phone:   
[phone]                          [facility]  
Work Phone:   
[phone]  
   
                                                    2022   
08:          Body weight         81.19 kg            Yumkio Macedoabi  
Work Phone:   
[phone]                          [facility]  
Work Phone:   
[phone]  
   
                                                    2022   
08:                              Diastolic blood   
pressure                  80 mm[Hg]                 Yumiko Zarrabi  
Work Phone:   
[phone]                          [facility]  
Work Phone:   
[phone]  
   
                                                    2022   
08:                              Systolic blood   
pressure                  129 mm[Hg]                Yumiko Zarrabi  
Work Phone:   
[phone]                          [facility]  
Work Phone:   
[phone]  
   
                                                    2022   
08:          Body height         160.02 cm           Yumiko Vidalrrabi  
Work Phone:   
[phone]                          [facility]  
Work Phone:   
[phone]  
   
                                                    2022   
08:                              Body mass index   
(BMI) [Ratio]             31.71 kg/m2               Yumiko Macedoabi  
Work Phone:   
[phone]                          [facility]  
Work Phone:   
[phone]  
   
                                                    2022   
08:                              Body surface area   
Derived from formula      1.84 m2                   Yumiko Zarrabi  
Work Phone:   
[phone]                          [facility]  
Work Phone:   
[phone]  
   
                                                    2022   
08:          Body weight         81.19 kg            Yumikofelicitas Smart  
Work Phone:   
[phone]                          [facility]  
Work Phone:   
[phone]  
   
                                                    2022   
08:                              Diastolic blood   
pressure                  80 mm[Hg]                 Yumiko Macedoabi  
Work Phone:   
[phone]                          [facility]  
Work Phone:   
[phone]  
   
                                                    2022   
08:          Heart rate          64 /min             Yumikofelicitas Smart  
Work Phone:   
[phone]                          [facility]  
Work Phone:   
[phone]  
   
                                                    2022   
08:                              Systolic blood   
pressure                  138 mm[Hg]                Yumiko Smart  
Work Phone:   
[phone]                          [facility]  
Work Phone:   
[phone]  
   
                                                    10-   
08:          Body height         160.02 cm           Yumiko Smart  
Work Phone:   
[phone]                          [facility]  
Work Phone:   
[phone]  
   
                                                    10-   
08:                              Body mass index   
(BMI) [Ratio]             31.89 kg/m2               Yumiko Macedoabi  
Work Phone:   
[phone]                          [facility]  
Work Phone:   
[phone]  
   
                                                    10-   
08:                              Body surface area   
Derived from formula      1.85 m2                   Yumiko Macedoabi  
Work Phone:   
[phone]                          [facility]  
Work Phone:   
[phone]  
   
                                                    10-   
08:          Body weight         81.64 kg            Yumiko Macedoabi  
Work Phone:   
[phone]                          [facility]   
Internal Medicine  
Work Phone:   
[phone]  
   
                                                    10-   
08:                              Diastolic blood   
pressure                  82 mm[Hg]                 Yumiko Macedoabi  
Work Phone:   
[phone]                          [facility]  
Work Phone:   
[phone]  
   
                                                    10-   
08:          Heart rate          64 /min             Yumiko Macedoabi  
Work Phone:   
[phone]                          [facility]   
Internal Medicine  
Work Phone:   
[phone]  
   
                                                    10-   
08:                              Systolic blood   
pressure                  138 mm[Hg]                uYmiko Macedoabi  
Work Phone:   
[phone]                          [facility]  
Work Phone:   
[phone]  
   
                                                    2022   
08:          Body height         160.02 cm           Yumiko Smart  
Work Phone:   
[phone]                          [facility]  
Work Phone:   
[phone]  
   
                                                    2022   
08:                              Body mass index   
(BMI) [Ratio]             32.06 kg/m2               Yumiko Macedoabi  
Work Phone:   
[phone]                          [facility]  
Work Phone:   
[phone]  
   
                                                    2022   
08:                              Body surface area   
Derived from formula      1.85 m2                   Yumiko Macedoabi  
Work Phone:   
[phone]                          [facility]  
Work Phone:   
[phone]  
   
                                                    2022   
08:          Body weight         82.1 kg             Yumiko Macedoabi  
Work Phone:   
[phone]                          [facility]  
Work Phone:   
[phone]  
   
                                                    2022   
08:                              Diastolic blood   
pressure                  80 mm[Hg]                 Yumiko Vidalrrabi  
Work Phone:   
[phone]                          [facility]  
Work Phone:   
[phone]  
   
                                                    2022   
08:          Heart rate          66 /min             Yumiko Macedoabi  
Work Phone:   
[phone]                          [facility]  
Work Phone:   
[phone]  
   
                                                    2022   
08:                              Systolic blood   
pressure                  130 mm[Hg]                Yumiko Smart  
Work Phone:   
[phone]                          [facility]   
Internal Medicine  
Work Phone:   
[phone]  
   
                                                    2022   
08:          Body height         160.02 cm           Ymuiko Smatr  
Work Phone:   
[phone]                          [facility]  
Work Phone:   
[phone]  
   
                                                    2022   
08:                              Body mass index   
(BMI) [Ratio]             28.52 kg/m2               Yumiko Smart  
Work Phone:   
[phone]                          [facility]  
Work Phone:   
[phone]  
   
                                                    2022   
08:                              Body surface area   
Derived from formula      1.76 m2                   Yumiko Smart  
Work Phone:   
[phone]                          [facility]  
Work Phone:   
[phone]  
   
                                                    2022   
08:          Body weight         73.03 kg            Yumiko Smart  
Work Phone:   
[phone]                          [facility]  
Work Phone:   
[phone]  
   
                                                    2022   
08:                              Diastolic blood   
pressure                  80 mm[Hg]                 Yumiko Smart  
Work Phone:   
[phone]                          [facility]   
Internal Medicine  
Work Phone:   
[phone]  
   
                                                    2022   
08:          Heart rate          66 /min             Yumiko Smart  
Work Phone:   
[phone]                          [facility]  
Work Phone:   
[phone]  
   
                                                    2022   
08:                              Systolic blood   
pressure                  114 mm[Hg]                Yumiko Smart  
Work Phone:   
[phone]                          [facility]  
Work Phone:   
[phone]  
   
                                                    2022   
08:          Body height         160.02 cm           Yumiko Smart  
Work Phone:   
[phone]                          [facility]  
Work Phone:   
[phone]  
   
                                                    2022   
08:                              Body mass index   
(BMI) [Ratio]             31.63 kg/m2               Yumiko Macedoabi  
Work Phone:   
[phone]                          [facility]  
Work Phone:   
[phone]  
   
                                                    2022   
08:                              Body surface area   
Derived from formula      1.84 m2                   Yumiko Macedoabi  
Work Phone:   
[phone]                          [facility]  
Work Phone:   
[phone]  
   
                                                    2022   
08:          Body weight         80.99 kg            Yumiko Smart  
Work Phone:   
[phone]                          [facility]  
Work Phone:   
[phone]  
   
                                                    2022   
08:                              Diastolic blood   
pressure                  80 mm[Hg]                 Yumiko Macedoabi  
Work Phone:   
[phone]                          [facility]  
Work Phone:   
[phone]  
   
                                                    2022   
08:          Heart rate          80 /min             Yumiko Smart  
Work Phone:   
[phone]                          [facility]  
Work Phone:   
[phone]  
   
                                                    2022   
08:                              Systolic blood   
pressure                  130 mm[Hg]                Yumiko Smart  
Work Phone:   
[phone]                          [facility]  
Work Phone:   
[phone]  
   
                                                    02-   
08:          Body height         160.02 cm           Yumiko Smart  
Work Phone:   
[phone]                          [facility]  
Work Phone:   
[phone]  
   
                                                    02-   
08:                              Body mass index   
(BMI) [Ratio]             31.35 kg/m2               Yumiko Macedoabi  
Work Phone:   
[phone]                          [facility]  
Work Phone:   
[phone]  
   
                                                    02-   
08:                              Body surface area   
Derived from formula      1.84 m2                   Yumiko Macedoabi  
Work Phone:   
[phone]                          [facility]   
Internal Medicine  
Work Phone:   
[phone]  
   
                                                    02-   
08:          Body weight         80.28 kg            Yumiko Smart  
Work Phone:   
[phone]                          [facility]  
Work Phone:   
[phone]  
   
                                                    02-   
08:                              Diastolic blood   
pressure                  86 mm[Hg]                 Yumiko Macedoabi  
Work Phone:   
[phone]                          [facility]   
Internal Medicine  
Work Phone:   
[phone]  
   
                                                    02-   
08:          Heart rate          76 /min             Yumiko Smart  
Work Phone:   
[phone]                          [facility]  
Work Phone:   
[phone]  
   
                                                    02-   
08:                              Systolic blood   
pressure                  128 mm[Hg]                Yumiko Macedoabi  
Work Phone:   
[phone]                          [facility]  
Work Phone:   
[phone]  
   
                                                    2021   
08:          Body height         160.02 cm           Yumiko Macedoabi  
Work Phone:   
[phone]                          [facility]  
Work Phone:   
[phone]  
   
                                                    2021   
08:                              Body mass index   
(BMI) [Ratio]             30.65 kg/m2               Yumiko Macedoabi  
Work Phone:   
[phone]                          [facility]  
Work Phone:   
[phone]  
   
                                                    2021   
08:                              Body surface area   
Derived from formula      1.82 m2                   Yumiko Macedoabi  
Work Phone:   
[phone]                          [facility]  
Work Phone:   
[phone]  
   
                                                    2021   
08:          Body weight         78.47 kg            Yumiko Macedoabi  
Work Phone:   
[phone]                          [facility]  
Work Phone:   
[phone]  
   
                                                    2021   
08:                              Diastolic blood   
pressure                  80 mm[Hg]                 Yumiko Macedoabi  
Work Phone:   
[phone]                          [facility]  
Work Phone:   
[phone]  
   
                                                    2021   
08:          Heart rate          80 /min             Yumiko Smart  
Work Phone:   
[phone]                          [facility]   
Internal Medicine  
Work Phone:   
[phone]  
   
                                                    2021   
08:                              Systolic blood   
pressure                  112 mm[Hg]                Yumiko Smart  
Work Phone:   
[phone]                          [facility]   
Internal Medicine  
Work Phone:   
[phone]  
   
                                                    10-   
09:                              Diastolic blood   
pressure                  74 mm[Hg]                 Yumiko Smart  
Work Phone:   
[phone]                          [facility]  
Work Phone:   
[phone]  
   
                                                    10-   
09:                              SaO2% (BldA) [Mass   
fraction]                 97 %                      Yumiko Smart  
Work Phone:   
[phone]                          [facility]  
Work Phone:   
[phone]  
   
                                                    10-   
09:                              Systolic blood   
pressure                  114 mm[Hg]                Yumiko Smart  
Work Phone:   
[phone]                          [facility]  
Work Phone:   
[phone]  
   
                                                    2021   
08:          Body height         160.02 cm           Yumiko Smart  
Work Phone:   
[phone]                          [facility]  
Work Phone:   
[phone]  
   
                                                    2021   
08:                              Body mass index   
(BMI) [Ratio]             29.76 kg/m2               Yumiko Smart  
Work Phone:   
[phone]                          [facility]  
Work Phone:   
[phone]  
   
                                                    2021   
08:                              Body surface area   
Derived from formula      1.8 m2                    Yumiko Smart  
Work Phone:   
[phone]                          [facility]  
Work Phone:   
[phone]  
   
                                                    2021   
08:          Body weight         76.2 kg             Yumiko Smart  
Work Phone:   
[phone]                          [facility]  
Work Phone:   
[phone]  
   
                                                    2021   
08:                              Diastolic blood   
pressure                  94 mm[Hg]                 Yumiko Macedoabi  
Work Phone:   
[phone]                          [facility]  
Work Phone:   
[phone]  
   
                                                    2021   
08:          Heart rate          60 /min             Yumiko Macedoabi  
Work Phone:   
[phone]                          [facility]  
Work Phone:   
[phone]  
   
                                                    2021   
08:                              Systolic blood   
pressure                  128 mm[Hg]                Yumiko Macedoabi  
Work Phone:   
[phone]                          [facility]  
Work Phone:   
[phone]  
   
                                                    2021   
08:          Body height         160.02 cm           Yumiko Smart  
Work Phone:   
[phone]                          [facility]  
Work Phone:   
[phone]  
   
                                                    2021   
08:                              Body mass index   
(BMI) [Ratio]             29.94 kg/m2               Yumiko Smart  
Work Phone:   
[phone]                          [facility]  
Work Phone:   
[phone]  
   
                                                    2021   
08:                              Body surface area   
Derived from formula      1.8 m2                    Yumiko Smart  
Work Phone:   
[phone]                          [facility]  
Work Phone:   
[phone]  
   
                                                    2021   
08:          Body weight         76.66 kg            Yumiko Smart  
Work Phone:   
[phone]                          [facility]  
Work Phone:   
[phone]  
   
                                                    2021   
08:                              Diastolic blood   
pressure                  74 mm[Hg]                 Yumiko Macedoabi  
Work Phone:   
[phone]                          [facility]  
Work Phone:   
[phone]  
   
                                                    2021   
08:          Heart rate          64 /min             Yumiko Macedoabi  
Work Phone:   
[phone]                          [facility]  
Work Phone:   
[phone]  
   
                                                    2021   
08:                              Systolic blood   
pressure                  110 mm[Hg]                Yumiko Zarrabi  
Work Phone:   
[phone]                          [facility]   
Internal Medicine  
Work Phone:   
[phone]  
   
                                                    2020   
10:                              BMI (Body Mass   
Index)              31.08 kg/m2         Yumikomaite Smart     [facility]   
Internal Medicine  
Work Phone:   
[phone]  
   
                                                    2020   
10:      Body weight     79.58 kg        Yumikomaite Smart [facility]   
Internal Medicine  
Work Phone:   
[phone]  
   
                                                    2020   
10:      BP Diastolic    86 mm[Hg]       Yumiko Smart [facility]   
Internal Medicine  
Work Phone:   
[phone]  
  
  
  
                                          
   
                                          
   
                                          
   
                                          
   
                                          
   
                                          
   
                                          
   
                                          
   
                                          
   
                                          
   
                                          
   
                                          
   
                                          
   
                                          
   
                                          
   
                                          
   
                                          
   
                                          
   
                                          
   
                                          
   
                                          
   
                                          
   
                                          
   
                                          
   
                                          
   
                                          
   
                                          
   
                                          
   
                                          
   
                                          
   
                                          
   
                                          
   
                                          
   
                                          
   
                                          
   
                                          
   
                                          
   
                                          
   
                                          
   
                                          
   
                                          
   
                                          
   
                                          
   
                                          
   
                                          
   
                                          
   
                                          
   
                                          
   
                                          
   
                                          
   
                                          
   
                                          
   
                                          
   
                                          
   
                                          
   
                                          
   
                                          
   
                                          
   
                                          
   
                                          
   
                                          
   
                                          
   
                                          
  
  
  
Encounters  
  
  
                          Encounter Date Encounter Type Care Provider Facility  
   
                                                    Start: 2024  
End: 2024     ambulatory          PADMINI VALIENTE [facility]  
   
                                                    Start: 2024  
End: 2024                         Subsequent hospital   
visit by physician                      Padmini Valiente MD  
Work Phone:   
[phone]                          [facility]  
  
  
  
                                          
   
                                          
   
                                          
   
                                          
   
                                          
   
                                          
   
                                          
   
                                          
   
                                          
   
                                          
   
                                          
   
                                          
   
                                          
   
                                          
   
                                          
   
                                          
   
                                          
   
                                          
   
                                          
   
                                          
   
                                          
   
                                          
   
                                          
   
                                          
   
                                          
   
                                          
   
                                          
   
                                          
   
                                          
   
                                          
   
                                          
   
                                          
   
                                          
   
                                          
   
                                          
   
                                          
   
                                          
   
                                          
   
                                          
   
                                          
   
                                          
   
                                          
   
                                          
   
                                          
   
                                          
   
                                          
   
                                          
   
                                          
   
                                          
   
                                          
   
                                          
   
                                          
   
                                          
   
                                          
   
                                          
   
                                          
   
                                          
   
                                          
   
                                          
   
                                          
   
                                          
   
                                          
   
                                          
   
                                          
   
                                          
   
                                          
   
                                          
   
                                          
   
                                          
   
                                          
   
                                          
   
                                          
   
                                          
   
                                          
   
                                          
   
                                          
   
                                          
   
                                          
   
                                          
   
                                          
   
                                          
   
                                          
   
                                          
   
                                          
   
                                          
   
                                          
   
                                          
   
                                          
   
                                          
   
                                          
   
                                          
   
                                          
   
                                          
   
                                          
   
                                          
   
                                          
   
                                          
   
                                          
   
                                          
   
                                          
   
                                          
   
                                          
   
                                          
   
                                          
   
                                          
   
                                          
   
                                          
   
                                          
   
                                          
   
                                          
   
                                          
   
                                          
   
                                          
   
                                          
   
                                          
   
                                          
   
                                          
   
                                          
   
                                          
   
                                          
   
                                          
   
                                          
   
                                          
   
                                          
   
                                          
   
                                          
   
                                          
   
                                          
   
                                          
   
                                          
   
                                          
   
                                          
   
                                          
   
                                          
   
                                          
   
                                          
   
                                          
   
                                          
   
                                          
   
                                          
   
                                          
   
                                          
   
                                          
   
                                          
   
                                          
  
  
  
Procedures  
  
  
                          Date         Procedure    Procedure Detail Performing   
Clinician  
   
                                                    Start:   
2024          Esophagogastroduodenoscopy                     PADMINI TAVAL  
LAEE  
   
                                                    Start:   
2024          SURGICAL PATHOLOGY EXAM                     PADMINI NATHALLAE  
E  
   
                                                    Start:   
2024                              [facility]   
AMBULATORY SURGERY                                  PADMINI TAVALLAEE  
   
                                                    Start:   
2024          PULSE OXIMETRY, SPOT                     PADMINI TAVALLAEE  
   
                                                    Start:   
2024                              Egd transoral biopsy   
single/multiple                                     Padmini Valiente MD  
Work Phone:   
[phone]  
   
                                                    Start:   
2024          PULSE OXIMETRY, SPOT                     Padmini Valiente MD  
Work Phone:   
[phone]  
   
                                                    Start:   
2024          Esophagogastroduodenoscopy                     PADMINI TAVAL  
LAEE  
   
                                                    Start:   
2024          SURGICAL PATHOLOGY EXAM                     PADMINI TAVALLAE  
E  
   
                                                    Start:   
2024                              [facility]   
AMBULATORY SURGERY                                  PADMINI TAVALLAEE  
   
                                                    Start:   
2024                              Colonoscopy w/biopsy   
single/multiple                                     Yumiko Smart MD  
Work Phone:   
[phone]  
   
                                                    Start:   
2024                              Esophagogastroduodenoscopy   
transoral diagnostic                                Yumiko Smart MD  
Work Phone:   
[phone]  
   
                                                    Start:   
2024          Colonoscopy                             Padmini Valiente MD  
Work Phone:   
[phone]  
   
                                                    Start:   
11-          DEXA BONE DENSITY                       PADMINI VALIENTE  
   
                                                    Start:   
2023                              BI MAMMO BILATERAL SCREENING   
TOMOSYNTHESIS                                       PADMINI VALIENTE  
   
                                                    Start:   
2023          Mammography                             Jose Maria Gonsalez DPM  
Work Phone:   
[phone]  
   
                                                    Start:   
10-          XR ELBOW LEFT 3+ VIEWS                     PADMINI VALIENTE  
   
                                                    Start:   
2023                              Comprehensive metabolic 2000 panel   
- Serum or Plasma                                   YUMIKO SMART  
   
                                                    Start:   
2023                              Hemoglobin A1c/Hemoglobin.total in   
Blood                                               YUMIKO SMART  
   
                                                    Start:   
2023                              Magnesium [Mass/volume] in Serum or   
Plasma                                              YUMIKO SMART  
   
                                                    Start:   
2023                              TSH WITH REFLEX TO FREE T4 IF   
ABNORMAL                                            YUMIKO SMART  
   
                                                    Start:   
2023          Antibody screen                         MD YUMIKO SMART  
  
  
  
                                          
   
                                          
   
                                          
   
                                          
   
                                          
   
                                          
   
                                          
   
                                          
   
                                          
   
                                          
   
                                          
   
                                          
   
                                          
   
                                          
   
                                          
   
                                          
   
                                          
   
                                          
   
                                          
   
                                          
   
                                          
   
                                          
   
                                          
   
                                          
   
                                          
   
                                          
   
                                          
   
                                          
   
                                          
   
                                          
   
                                          
   
                                          
   
                                          
   
                                          
   
                                          
   
                                          
   
                                          
   
                                          
   
                                          
   
                                          
   
                                          
   
                                          
   
                                          
   
                                          
   
                                          
   
                                          
   
                                          
   
                                          
   
                                          
   
                                          
   
                                          
   
                                          
   
                                          
   
                                          
   
                                          
   
                                          
   
                                          
   
                                          
   
                                          
   
                                          
   
                                          
   
                                          
   
                                          
   
                                          
   
                                          
   
                                          
  
  
  
Plan of Treatment  
  
  
                          Date         Care Activity Detail       Author  
   
                                                    Start:   
01-                              Screening for malignant neoplasm   
of colon                                            [facility]  
   
                                                    Start:   
2032                              Screening for malignant neoplasm   
of colon                                            [facility]  
   
                                                    Start:   
2032                              Screening for malignant neoplasm   
of colon                                            [facility]  
   
                                                    Start:   
2029                              DTaP/Tdap/Td Vaccines (2 - Td or   
Tdap)                                   DTaP/Tdap/Td Vaccines   
(2 - Td or Tdap)                        [facility]  
   
                                                    Start:   
2029          Tetanus vaccination Tetanus: Every 10yrs [facility]  
   
                                                    Start:   
2028          Lipid panel         Lipid Panel         [facility]  
   
                                                    Start:   
2027          Lipid panel         Lipid Panel         [facility]  
   
                                                    Start:   
2024                              Screening for malignant neoplasm   
of breast                 Mammogram                 [facility]  
   
                                                    Start:   
10-                Medicare Annual Wellness Visit Medicare Annual   
Wellness Visit (AWV)                    [facility]  
   
                                                    Start:   
10-                              History and physical examination,   
annual for health maintenance Wellness Visit            [facility]  
   
                                                    Start:   
2024          Diabetes mellitus screening Diabetes Screening  [facility]  
   
                                                    Start:   
2024                Hemoglobin A1c measurement Diabetes: Hemoglobin   
A1C                                     [facility]  
   
                                                    Start:   
2024                              Pneumococcal Vaccine: 65+ Years   
(2 - PCV)                               Pneumococcal Vaccine:   
65+ Years (2 - PCV)                     [facility]  
   
                                                    Start:   
2024                              Pneumococcal Vaccine: Age 65+ (2   
- PCV)                                  Pneumococcal Vaccine:   
Age 65+ (2 - PCV)                       [facility]  
   
                                                    Start:   
2024          Diabetes mellitus screening Diabetes Screening  [facility]  
   
                                                    Start:   
2024                Hemoglobin A1c measurement Diabetes: Hemoglobin   
A1C                                     [facility]  
   
                                                    Start:   
2024  
End:   
2024                Patient encounter procedure 2024 10:00 AM EDT   
Office Visit [facility]   
 [address]   
[place] [id number] [phone] Yumiko Smart MD  [address]   
[place] [address] [phone]   
(Work) [phone]   
(Fax)                                   [facility]   
Internal Medicine  
   
                                                    Start:   
2024  
End:   
2025                Esophagogastroduodenoscopy EGD Endoscopy Routine   
Gastroesophageal reflux   
disease, unspecified   
whether esophagitis   
present Expected:   
2024, Expires:   
2025                              [facility]  
Work Phone:   
[phone]  
  
  
  
                                          
   
                                          
   
                                          
   
                                          
   
                                          
   
                                          
   
                                          
   
                                          
   
                                          
   
                                          
   
                                          
   
                                          
   
                                          
   
                                          
   
                                          
   
                                          
   
                                          
   
                                          
   
                                          
   
                                          
   
                                          
   
                                          
   
                                          
   
                                          
   
                                          
   
                                          
   
                                          
   
                                          
   
                                          
   
                                          
   
                                          
   
                                          
   
                                          
   
                                          
   
                                          
   
                                          
   
                                          
   
                                          
   
                                          
   
                                          
   
                                          
   
                                          
   
                                          
   
                                          
   
                                          
   
                                          
   
                                          
   
                                          
   
                                          
   
                                          
   
                                          
   
                                          
   
                                          
   
                                          
   
                                          
   
                                          
   
                                          
   
                                          
   
                                          
   
                                          
   
                                          
   
                                          
   
                                          
   
                                          
   
                                          
   
                                          
   
                                          
   
                                          
   
                                          
   
                                          
   
                                          
   
                                          
   
                                          
   
                                          
   
                                          
   
                                          
   
                                          
   
                                          
   
                                          
   
                                          
   
                                          
   
                                          
   
                                          
   
                                          
   
                                          
   
                                          
   
                                          
   
                                          
   
                                          
   
                                          
   
                                          
   
                                          
   
                                          
   
                                          
   
                                          
   
                                          
   
                                          
   
                                          
   
                                          
   
                                          
   
                                          
   
                                          
   
                                          
   
                                          
   
                                          
   
                                          
   
                                          
   
                                          
   
                                          
   
                                          
   
                                          
   
                                          
   
                                          
   
                                          
   
                                          
   
                                          
   
                                          
   
                                          
   
                                          
   
                                          
   
                                          
   
                                          
   
                                          
   
                                          
   
                                          
   
                                          
   
                                          
   
                                          
   
                                          
   
                                          
   
                                          
   
                                          
   
                                          
   
                                          
   
                                          
   
                                          
   
                                          
   
                                          
   
                                          
   
                                          
   
                                          
   
                                          
   
                                          
  
  
  
Immunizations  
  
  
                      Immunization Date Immunization Notes      Care Provider Fa  
cili  
   
                                        11-          RSV, 60 Years And Ol  
armani   
(AREXVY)                                            Yumiko Smart MD  
Work Phone:   
[phone]                          [facility]  
Work Phone:   
[phone]  
   
                                        10-          Flu vaccine,   
quadrivalent, high-dose,   
preservative free, age   
65y+ (FLUZONE)                                      Yumiko Smart MD  
Work Phone:   
[phone]                          [facility]  
Work Phone:   
[phone]  
   
                                        2023          pneumococcal   
polysaccharide vaccine,   
23 valent                                           Yumiko Smart MD  
Work Phone:   
[phone]                          [facility]  
Work Phone:   
[phone]  
   
                                        2022          Pfizer COVID-19 Vac   
Bivalent 30 MCG/0.3ML   
Intramuscular Suspension                            Yumiko Smart  
Work Phone:   
[phone]                          [facility]  
   
                                        10-          Fluzone High-Dose   
Quadrivalent 0.7 ML   
Intramuscular Suspension   
Prefilled Syringe;   
Translations: [Fluzone   
High-Dose Quadrivalent   
0.7 ML Intramuscular   
Suspension Prefilled   
Syringe]                                            Yumiko Smart  
Work Phone:   
[phone]                          [facility]   
Internal Medicine  
Work Phone:   
[phone]  
  
  
  
                                          
   
                                          
   
                                          
   
                                          
   
                                          
   
                                          
   
                                          
   
                                          
   
                                          
   
                                          
   
                                          
   
                                          
   
                                          
   
                                          
   
                                          
   
                                          
   
                                          
   
                                          
   
                                          
   
                                          
   
                                          
   
                                          
   
                                          
   
                                          
  
  
  
Payers  
  
  
                          Date         Payer Category Payer        Policy ID  
   
                          2022   Medicare                  [id number]  
[id number]  
[id number]  
   
                          2022   Medicare                  [id number]  
   
                                2018      Medicare        [employer]   
[facility]   
[employer]   
ADVANTAGE CHOICE HMO   
xxxxxxxxxxxx 2018-Present   
[address] [place]   
[address]                                   xxxxxxxxxxxx   
[id number]  
[id number]  
   
                          2018   Unknown                     
   
                          2018   Unknown                   [id number]   
[id number]  
[id number]  
   
                          1955   Unknown                   [id number]   
[id number]  
   
                          1955   Unknown                   [id number]   
[id number]  
   
                          1955   Unknown                   [id number]   
[id number]  
   
                          1955   Unknown                   [id number]   
[id number]  
   
                          1955   Unknown                   [id number]   
[id number]  
   
                          1955   Unknown                   [id number]   
[id number]  
   
                          1955   Unknown                   [id number]   
[id number]  
   
                          1955   Unknown                   [id number]   
[id number]  
   
                          1955   Unknown                   [id number]   
[id number]  
   
                          1955   Unknown                   [id number]   
[id number]  
   
                          1955   Unknown                   [id number]   
[id number]  
   
                          1955   Unknown                   [id number]   
[id number]  
   
                          1955   Unknown                   [id number]   
[id number]  
   
                          1955   Unknown                   [id number]   
[id number]  
5  
   
                          1955   Unknown                   [id number]   
[id number]  
9  
   
                          1955   Unknown                   [id number]   
[id number]  
9  
   
                          1955   Unknown                   [id number]   
[id number]  
9  
   
                          1955   Unknown                   [id number]   
[id number]  
9  
   
                          1955   Unknown                   [id number]   
[id number]  
   
                          1955   Unknown                   [id number]   
[id number]  
   
                          1955   Unknown                   [id number]   
[id number]  
   
                          1955   Unknown                   [id number]   
[id number]  
4  
   
                          1955   Unknown                   [id number]   
[id number]  
4  
   
                          1955   Unknown                   [id number]   
[id number]  
4  
   
                          1955   Unknown                   [id number]   
[id number]  
4  
   
                          1955   Unknown                   [id number]   
[id number]  
4  
   
                          1955   Unknown                   [id number]   
[id number]  
   
                          1955   Unknown                   [id number]   
[id number]  
   
                          1955   Unknown                   [id number]   
[id number]  
   
                          1955   Unknown                   [id number]   
[id number]  
   
                          1955   Unknown                   [id number]   
[id number]  
   
                          1955   Unknown                   [id number]   
[id number]  
   
                          1955   Unknown                   [id number]   
[id number]  
   
                          1955   Unknown                   [id number]   
[id number]  
3  
   
                          1955   Unknown                   [id number]   
[id number]  
3  
   
                          1955   Unknown                   [id number]   
[id number]  
3  
   
                          1955   Unknown                   [id number]   
[id number]  
3  
   
                          1955   Unknown                   [id number]   
[id number]  
3  
  
  
  
Social History  
  
  
                          Date         Type         Detail       Facility  
   
                                                    Start: 2019  
End: 2023                         Tobacco smoking   
status NHIS               Never smoker              [place]  
   
                                                    Start: 2019  
End: 10-     Alcohol intake      Yes                 [place]  
   
                                        Start: 2019   History SDOH Alcohol  
   
Frequency                 2                         [place]  
   
                                        Start: 2019   History SDOH Alcohol  
   
Std Drinks                1                         [place]  
   
                                        Start: 1955   Sex Assigned At   
Birth                     Not on file               [place]  
   
                                                    Start: 2019  
End: 2024           Alcohol intake            Current drinker of   
alcohol (finding)                       [place]  
   
                                                    Start: 2020  
End: 2024                         Tobacco use and   
exposure                  Never used                [facility]  
Work Phone:   
[phone]  
   
                                                            Exposure to   
SARS-CoV-2 (event)        Unable to assess          [place]  
   
                                                    Start: 2023  
End: 10-     Former smoker       Former smoker       [facility]  
   
Medicine  
Work Phone:   
[phone]  
   
                                                            Tobacco smoking   
status [facility]                             Tobacco smoking   
consumption unknown                     [facility]  
   
                                                    Start: 2023  
End: 2024                         Tobacco smoking   
status NHIS               Ex-smoker                 [facility]  
   
                                                      
End: 1981                         History of tobacco   
use                       Current smoker            [facility]  
Work Phone:   
[phone]  
   
                                                      
End: 1981                         History of tobacco   
use                       Cigarette Smoker          [facility]  
Work Phone:   
[phone]  
   
                          Start: 2023 Alcohol Comment SOCIALLY     [facility]  
[facility]  
Work Phone:   
[phone]  
   
                                                    Start: 2023  
End: 2024                         Exposure to   
SARS-CoV-2 (event)        Not sure                  [facility]  
   
                                                    NEGATED: Highlighted   
row                 -                   -                   [facility] DO  
Work Phone:   
[phone]  
  
  
  
Medical Equipment  
  
  
                                Procedure Code  Equipment Code  Equipment Origin  
al   
Text                      Equipment Identifier      Dates  
   
                                                                test ONCE DAILY   
AS   
DIRECTED                  [id number]                 Start:   
2018  
   
                                                                Easy Touch Tu  
ts   
32G USE AS DIRECTED.   
Quantity: 1 Refills:   
3 Yumiko Smart MD   
Start : 2-Dec-2019   
Active 100 Unit Box                                 Start:   
2019  
   
                                                                Easy Touch Test   
In   
Vitro Strip USE AS   
DIRECTED. Quantity: 2   
Refills: 4 Yumiko Smart MD Start :   
2-Dec-2019 Active 50   
Strip Box                                           Start:   
2019  
   
                                                                Easy Touch Tu  
ts   
32G USE AS DIRECTED.   
Quantity: 1 Refills:   
3 Yumiko Smart MD   
Start : 2-Dec-2019   
Active 100 Unit Box                                 Start:   
2019  
   
                                                                Easy Touch Test   
In   
Vitro Strip USE AS   
DIRECTED. Quantity: 2   
Refills: 4 Yumiko Smart MD Start :   
2-Dec-2019 Active 50   
Strip Box                                           Start:   
2019  
   
                                                                Easy Touch Tu  
ts   
32G USE AS DIRECTED.   
Quantity: 1 Refills:   
3 Yumiko Smart MD   
Start : 2-Dec-2019   
Active 100 Unit Box                                 Start:   
2019  
   
                                                                Easy Touch Test   
In   
Vitro Strip USE AS   
DIRECTED. Quantity: 2   
Refills: 4 Yumiko Smart MD Start :   
2-Dec-2019 Active 50   
Strip Box                                           Start:   
2019  
   
                                                                Easy Touch Tu  
ts   
32G USE AS DIRECTED.   
Quantity: 1 Refills:   
3 Yumiko Smart MD   
Start : 2-Dec-2019   
Active 100 Unit Box                                 Start:   
2019  
   
                                                                Easy Touch Test   
In   
Vitro Strip USE AS   
DIRECTED. Quantity: 2   
Refills: 4 Yumiko Smart MD Start :   
2-Dec-2019 Active 50   
Strip Box                                           Start:   
2019  
   
                                                                Easy Touch Tu  
ts   
32G USE AS DIRECTED.   
Quantity: 1 Refills:   
3 Yumiko Smart MD   
Start : 2-Dec-2019   
Active 100 Unit Box                                 Start:   
2019  
   
                                                                Easy Touch Test   
In   
Vitro Strip USE AS   
DIRECTED. Quantity: 2   
Refills: 4 Yumiko Smart MD Start :   
2-Dec-2019 Active 50   
Strip Box                                           Start:   
2019  
   
                                                                Easy Touch Tu  
ts   
32G USE AS DIRECTED.   
Quantity: 1 Refills:   
3 Yumiko Smart MD   
Start : 2-Dec-2019   
Active 100 Unit Box                                 Start:   
2019  
   
                                                                Easy Touch Test   
In   
Vitro Strip USE AS   
DIRECTED. Quantity: 2   
Refills: 4 Yumiko Smart MD Start :   
2-Dec-2019 Active 50   
Strip Box                                           Start:   
2019  
   
                                                                Easy Touch Tu  
ts   
32G USE AS DIRECTED.   
Quantity: 1 Refills:   
3 Yumiko Smart MD   
Start : 2-Dec-2019   
Active 100 Unit Box                                 Start:   
2019  
   
                                                                Easy Touch Test   
In   
Vitro Strip USE AS   
DIRECTED. Quantity: 2   
Refills: 4 Yumiko Smart MD Start :   
2-Dec-2019 Active 50   
Strip Box                                           Start:   
2019  
   
                                            USE AS DIRECTED. [id number]    Start:   
2019  
   
                                                                [employer]  
lux   
[address] [place]   
Conditional Case   
[id number]                    [id number]               Start:   
2019  
  
  
  
                                          
  
  
  
Functional Status  
  
  
                          Date         Assessment   Result       Facility  
   
                                                    NEGATED: Highlighted   
row                       Functional performance    Functional status   
health issues are not   
documented Disease                      [facility]  
Work Phone:   
[phone]  
  
  
  
Mental Status  
  
  
                          Date         Assessment   Result       Facility  
   
                                                    NEGATED: Highlighted   
row                                     Cognitive function   
[Interpretation]                        Cognitive status   
health issues are not   
documented Disease                      [facility] DO  
Work Phone:   
[phone]  
  
  
  
Clinical Notes 2021 to 2024  
Discharge InstructionsPadmini Valiente MD - 2024 11:30 AM Jhon Valiente MD - 2024 11:30 AM Jose Maria Queen DPM - 
2024 9:33 AM EST  
  
                                Note Date & Type Note            Facility  
   
                                                    2024 Hospital   
Discharge instructions                    
  
  
Trang Dominguez RN - 2024   
12:32 PM ESTFormatting of this   
note might be different from the   
original.  
Patient Instructions after a   
Colonoscopy  
  
The anesthetics, sedatives or   
narcotics which were given to   
you today will be acting in your   
body for the next 24 hours, so   
you might feel a little sleepy   
or groggy. This feeling should   
slowly wear off. Carefully read   
and follow the instructions.  
  
You received sedation today:  
- Do not drive or operate any   
machinery or power tools of any   
kind.  
- No alcoholic beverages today,   
not even beer or wine.  
- Do not make any important   
decisions or sign any legal   
documents.  
- No over the counter   
medications that contain alcohol   
or that may cause drowsiness.  
- Do not make any important   
decisions or sign any legal   
documents.  
  
While it is common to experience   
mild to moderate abdominal   
distention, gas, or belching   
after your procedure, if any of   
these symptoms occur following   
discharge from the GI Lab or   
within one week of having your   
procedure, call the [facility] to be advised   
whether a visit to your nearest   
Urgent Care or Emergency   
Department is indicated. Take   
this paper with you if you go.  
  
- If you develop an allergic   
reaction to the medications that   
were given during your procedure   
such as difficulty breathing,   
rash, hives, severe nausea,   
vomiting or lightheadedness.  
- If you experience chest pain,   
shortness of breath, severe   
abdominal pain, fevers and   
chills.  
-If you develop signs and   
symptoms of bleeding such as   
blood in your spit, if your   
stools turn black, tarry, or   
bloody  
- If you have not urinated   
within 8 hours following your   
procedure.  
- If your IV site becomes   
painful, red, inflamed, or looks   
infected.  
  
If you received a   
biopsy/polypectomy/sphincterotom  
y the following instructions   
apply below:  
  
__ Do not use Aspirin containing   
products, non-steroidal   
medications or anti-coagulants   
for one week following your   
procedure. (Examples of these   
types of medications are: Advil,   
Arthrotec, Aleve, Coumadin,   
Ecotrin, Heparin, Ibuprofen,   
Indocin, Motrin, Naprosyn,   
Nuprin, Plavix, Vioxx, and   
Voltarin, or their generic   
forms. This list is not   
all-inclusive. Check with your   
physician or pharmacist before   
resuming medications.)  
__ Eat a soft diet today. Avoid   
foods that are poorly digested   
for the next 24 hours. These   
foods would include: nuts,   
beans, lettuce, red meats, and   
fried foods. Start with liquids   
and advance your diet as   
tolerated, gradually work up to   
eating solids.  
__ Do not have a Barium Study or   
Enema for one week.  
  
Your physician recommends the   
additional following   
instructions:  
  
-You have a contact number   
available for emergencies. The   
signs and symptoms of potential   
delayed complications were   
discussed with you. You may   
return to normal activities   
tomorrow.  
-Resume your previous diet.  
-Continue your present   
medications.  
-We are waiting for your   
pathology results.  
-Your physician has recommended   
a repeat colonoscopy (date to be   
determined after pending   
pathology results are reviewed)   
for surveillance based on   
pathology results.  
-The findings and   
recommendations have been   
discussed with you.  
-The findings and   
recommendations were discussed   
with your family.  
- Please see Medication   
Reconciliation Form for new   
medication/medications   
prescribed.  
  
  
If you experience any problems   
or have any questions following   
discharge from the GI Lab,   
please call: Dr. Valiente's   
office.  
  
  
  
Electronically signed by Trang Dominguez RN at 2024 12:32   
PM EST  
  
documented in this encounter            [facility]  
Work Phone:   
[phone]  
   
                                                    2024 History and   
physical note                           Formatting of this note is   
different from the original.  
Pre procedure H&P  
Pt feels fine , no complaint  
General appearance: Comfortable,   
no distress  
ROS: No SOB  
Medications reviewed  
Head: Normal  
Neck: Soft  
Heart: Regular  
Lungs: Clear  
Abdomen: soft  
  
Impression: clinically doing   
fine, proceed with procedure  
  
Problem List Items Addressed   
This Visit  
  
Bariatric surgery status  
Relevant Orders  
EGD  
Chronic diarrhea  
Relevant Orders  
Colonoscopy Screening; Average   
Risk Patient  
EGD  
GERD (gastroesophageal reflux   
disease)  
Relevant Orders  
EGD  
S/P laparoscopic cholecystectomy  
Relevant Orders  
Colonoscopy Screening; Average   
Risk Patient  
EGD  
  
Other Visit Diagnoses  
  
Poor appetite  
Relevant Orders  
EGD  
  
  
  
  
  
Electronically signed by Padmini Valiente MD at 2024   
10:59 AM EST  
                                        [facility]  
Work Phone:   
[phone]  
   
                                                    2024 History and   
physical note                           Formatting of this note is   
different from the original.  
Pre procedure H&P  
Pt feels fine , no complaint  
General appearance: Comfortable,   
no distress  
ROS: No SOB  
Medications reviewed  
Head: Normal  
Neck: Soft  
Heart: Regular  
Lungs: Clear  
Abdomen: soft  
  
Impression: clinically doing   
fine, proceed with procedure  
  
Problem List Items Addressed   
This Visit  
  
Bariatric surgery status  
Relevant Orders  
EGD  
Chronic diarrhea  
Relevant Orders  
Colonoscopy Screening; Average   
Risk Patient  
EGD  
GERD (gastroesophageal reflux   
disease)  
Relevant Orders  
EGD  
S/P laparoscopic cholecystectomy  
Relevant Orders  
Colonoscopy Screening; Average   
Risk Patient  
EGD  
  
Other Visit Diagnoses  
  
Poor appetite  
Relevant Orders  
EGD  
  
  
  
  
  
Electronically signed by Padmini Valiente MD at 2024   
10:59 AM EST  
documented in this encounter            [facility]  
Work Phone:   
[phone]  
   
                                                    2024 History of   
Present illness Narrative               Formatting of this note might be   
different from the original.  
Patient: Shanice Dahl  
YOB: 1955 (68   
y.o.)  
PCP: Omar Alamo MD  
  
Procedures  
  
  
ASSESSMENT/PLAN:  
Shanice Dahl 68 y.o. female   
with history of painful bunion,   
hammertoes 2 through 4 and 5 on   
the right foot. Lumbar   
radiculopathy of the lower   
extremities.  
Plan: Patient may be needing   
surgery on her lower back for   
radiculopathy and is also being   
treated for gallbladder problems   
so she is putting off for   
elective right foot surgery.   
Patient was given refill for her   
radiculopathy symptoms in her   
feet for gabapentin 200 mg at   
nighttime. Patient to call when   
she is ready to reschedule her   
right foot surgery.  
  
Assessment & plan notes cannot   
be loaded without a specified   
hospital service.  
  
SUBJECTIVE:  
History Since Last Visit:   
Patient 68-year-old female seen   
at the office for painful bunion   
and hammertoes 2 through 5 on   
the right foot. Patient is   
wearing wider shoes and taking   
anti-inflammatories to manage   
her symptoms. Patient wants to   
hold off on surgery due to her   
lumbar back issues and   
gallbladder problems. Patient   
did ask for refill of her   
gabapentin which helps her   
radiculopathy symptoms in her   
feet.  
  
Review of Systems:  
  
OBJECTIVE:  
Physical Examination:  
Integument-skin is warm dry and   
supple bilateral feet.  
Neuro-diminished sensation to   
toes bilateral feet.  
Musculoskeletal-medial bony   
prominence of the first   
metatarsal of the lateral   
deviated right hallux. Patient   
has contracted toes that are   
painful 2 through 5 on the right   
foot.  
Vascular-DP and PT pulses are   
palpable bilateral feet.  
/83 (BP Location: Left   
arm, Patient Position: Sitting,   
BP Cuff Size: Adult)   Pulse 98   
  Temp 98.2 F (36.8 C)   
(Infrared)  
  
Laboratory and Additional Data   
Reviewed:  
Reviewed:[id number]}  
XR Lumbar Spine 2-3 Views   
(Standard)  
Narrative: EXAMINATION:  
XR LUMBAR SPINE 2-3 VIEWS   
(STANDARD)  
  
HISTORY:  
lower back pain with hx of   
degenerative disc disease  
  
COMPARISON:  
None.  
Impression: FINDINGS/  
1. Anterolisthesis of L5 on S1   
measuring 1 cm.  
2. Vertebral body height is   
preserved.  
3. No acute fracture or   
dislocation.  
4. Above average stool burden of   
the right colon.  
5. Cholecystectomy.  
6. Moderate degeneration of the   
bilateral sacroiliac joints.  
  
Workstation ID: [id number]  
  
  
Electronically signed by   
Jose Maria Gonsalez DPM at   
2024 9:36 AM EST  
documented in this encounter            [facility] [facility]  
   
                                                    2023 History of   
Present illness Narrative               Formatting of this note might be   
different from the original.  
Patient: Shanice Dahl  
YOB: 1955 (68   
y.o.)  
PCP: Omar Alamo MD  
  
Procedures  
  
  
ASSESSMENT/PLAN:  
Shanice Dahl 68 y.o. female   
with history of idiopathic   
neuropathy symptoms of both feet   
with probable radiculopathy from   
the lower back. Painful bunion   
and hammertoes 2 through 4 on   
the right foot.  
Plan: I prescribed 200 mg of   
gabapentin to be taken at   
nighttime before bed. Patient   
would really like to have her   
foot fixed before vacation in   
April. After doing my updated   
exam I have recommended an   
Ronnie bunionectomy and   
arthrodesis of toes 2 through 4   
the right foot. Patient is going   
for a pain management consult   
for lower extremity   
radiculopathy from her lumbar   
sacral spine. Patient also has a   
colonoscopy coming up due to GI   
problems after having her   
gallbladder removed. If both   
appointments go well patient   
like to move forward with   
surgery and mid-to-late January.  
  
Assessment & plan notes cannot   
be loaded without a specified   
hospital service.  
  
SUBJECTIVE:  
History Since Last Visit:   
Patient 68-year-old female who   
comes in complaining of   
worsening symptoms of her   
hammertoes and bunion pain on   
the right foot. Patient   
unfortunately still does not   
have a pain management consult   
for the tingling in her toes   
from her lower back that is   
aggravated when laying down..   
Patient has bone spurs on her   
lower sacral spine likely   
putting pressure on the sciatic   
nerves giving her tingling in   
her feet. Patient would like to   
have a higher dosage of the   
gabapentin which does help at   
night.  
  
Review of Systems:  
  
OBJECTIVE:  
Physical Examination:  
Integument-skin is warm dry and   
supple bilateral feet.  
Neuro-patient  
Musculoskeletal-patient has pain   
over the dorsomedial bony   
prominence of the first   
metatarsal head on the right   
foot. Patient has no pain with   
first MPJ range of motion on the   
right foot. Patient does have   
contracted toes 2 through 5   
bilateral feet. Patient's   
contractures are rigid on toes 2   
through 4 on both feet. Patient   
only appears to be having   
significant pain in the bunion   
and hammertoes 2 through 4 on   
the right foot today.  
Vascular-DP and PT pulses are   
palpable bilateral feet.  
/78 (BP Location: Right   
arm, Patient Position: Sitting,   
BP Cuff Size: Adult)   Pulse 62   
  Temp 97.9 F (36.6 C)   
(Infrared)  
  
Laboratory and Additional Data   
Reviewed:  
Reviewed:[id number]}  
XR Lumbar Spine 2-3 Views   
(Standard)  
Narrative: EXAMINATION:  
XR LUMBAR SPINE 2-3 VIEWS   
(STANDARD)  
  
HISTORY:  
lower back pain with hx of   
degenerative disc disease  
  
COMPARISON:  
None.  
Impression: FINDINGS/  
1. Anterolisthesis of L5 on S1   
measuring 1 cm.  
2. Vertebral body height is   
preserved.  
3. No acute fracture or   
dislocation.  
4. Above average stool burden of   
the right colon.  
5. Cholecystectomy.  
6. Moderate degeneration of the   
bilateral sacroiliac joints.  
  
Workstation ID: [id number]  
  
  
Electronically signed by   
Jose Maria Gonsalez DPM at   
2023 12:42 PM EST  
documented in this encounter            [facility]  
   
                                                    2023 History of   
Present illness Narrative               Formatting of this note is   
different from the original.  
Subjective  
Patient ID: Shanice Dahl is   
a 68 y.o. female who presents   
for Follow-up (2 MONTH F/U XRAY   
LEFT ELBOW, MAMMOGRAM, DEXA BONE   
DENSITY. C/O BACK PAIN S/P FALL   
IN 2023 - SCHEDULED FOR   
MRI SPINE IN JANUARY. C/O   
CHRONIC DIARRHEA SINCE   
CHOLECYSTECTOMY AND DECREASED ).  
HPI  
F/U ON XR, MAMMO AND BONE   
DENSITY . MED REFILL. WORSENING   
Chronic diarrhea ,S/P   
CHOLECYSTECTOMY A WHILE AGO.   
CHOLESTYRAMINE DOESN'T HELP   
MUCH. POOR APPETITE ,   
INDIGESTION ,WEIGHT   
LOSS.,FATIGUE.  
Review of Systems  
Constitutional: Positive for   
fatigue. Negative for chills and   
fever.  
POOR APPETITE,WEIGHT LOSS.  
HENT: Negative. Negative for   
congestion, postnasal drip and   
rhinorrhea.  
Eyes: Negative. Negative for   
visual disturbance.  
Respiratory: Negative for cough,   
shortness of breath and   
wheezing.  
Cardiovascular: Negative.   
Negative for chest pain,   
palpitations and leg swelling.  
Gastrointestinal: Positive for   
diarrhea. Negative for abdominal   
distention, abdominal pain,   
constipation, nausea and   
vomiting.  
Endocrine: Negative.  
Genitourinary: Negative for   
dysuria and urgency.  
Musculoskeletal: Negative.   
Negative for back pain.  
Skin: Negative. Negative for   
rash.  
Allergic/Immunologic: Negative   
for immunocompromised state.  
Neurological: Negative. Negative   
for dizziness, weakness,   
light-headedness and headaches.  
Psychiatric/Behavioral:   
Negative. Negative for   
agitation.  
  
Objective  
Physical Exam  
Constitutional:  
General: She is not in acute   
distress.  
HENT:  
Head: Normocephalic.  
Nose: Nose normal.  
Mouth/Throat:  
Mouth: Mucous membranes are   
moist.  
Eyes:  
Conjunctiva/sclera: Conjunctivae   
normal.  
Pupils: Pupils are equal, round,   
and reactive to light.  
Cardiovascular:  
Rate and Rhythm: Normal rate and   
regular rhythm.  
Pulses: Normal pulses.  
Heart sounds: Normal heart   
sounds.  
Pulmonary:  
Effort: No respiratory distress.  
Breath sounds: No wheezing.  
Chest:  
Chest wall: No tenderness.  
Abdominal:  
General: Abdomen is flat. Bowel   
sounds are normal.  
Palpations: Abdomen is soft.  
Tenderness: There is no   
abdominal tenderness.  
Musculoskeletal:  
General: No tenderness. Normal   
range of motion.  
Cervical back: Normal range of   
motion.  
Lymphadenopathy:  
Cervical: No cervical   
adenopathy.  
Skin:  
General: Skin is warm and dry.  
Findings: No rash.  
Neurological:  
General: No focal deficit   
present.  
Mental Status: She is alert.   
Mental status is at baseline.  
Psychiatric:  
Mood and Affect: Mood normal.  
Behavior: Behavior normal.  
  
Assessment/Plan  
1. Chronic diarrhea Colonoscopy   
Screening; Average Risk Patient  
EGD  
cholestyramine light (Prevalite)   
4 gram packet  
  
2. Gastroesophageal reflux   
disease without esophagitis EGD  
famotidine (Pepcid) 20 mg tablet  
omeprazole (PriLOSEC) 40 mg DR   
capsule  
  
3. S/P laparoscopic   
cholecystectomy Colonoscopy   
Screening; Average Risk Patient  
EGD  
  
4. Bariatric surgery status EGD  
  
5. Poor appetite EGD  
  
6. Benign essential HTN   
amLODIPine (Norvasc) 5 mg tablet  
metoprolol succinate XL   
(Kapspargo Sprinkle) 25 mg 24 hr   
capsule  
  
7. Allergic rhinitis,   
unspecified seasonality,   
unspecified trigger fexofenadine   
(Allegra) 180 mg tablet  
  
8. Chronic fatigue  
  
9. Weight loss  
  
TEST RESULTS WERE DISCUSSED.  
ADVISED TO HAVE LOW FAT AND LOW   
CALORIE DIET , DAILY EXERCISE.   
WILL INCREASE THE CHOLESTYRAMINE   
TO BID.  
MONITOR BP  
GOAL BP LOWER THAN 130/80  
LOW SALT  
EXERCISE DAILY  
Stop smoking and chewing   
tobacco.  
Lose weight.  
Do not overeat. Eat small   
portions at meals and snacks.  
Avoid tight clothing and   
tight-fitting belts. Do not lie   
down or bend over within the   
first 15-30 minutes after   
eating.  
Do not chew gum or suck on hard   
candy. Swallowing air with   
chewing gum and sucking on hard   
candy can cause belching and   
reflux.  
Raise the head of your bed 6-8   
inches.  
Do not eat/drink: chocolate,   
tomatoes, tomato sauces,   
oranges, pineapple, grapefruit,   
mints, coffee, alcohol,   
carbonated beverages, and black   
pepper.  
Eat a low fat diet. Fatty and   
greasy foods cause your stomach   
to produce more acid.  
ADVISED FOR GLUTEN FREE DIET.  
  
MDM  
  
1) COMPLEXITY: 1 UNDIAGNOSED NEW   
PROBLEM WITH UNCERTAIN PROGNOSIS  
2)DATA: TESTS INTERPRETED AND OR   
ORDERED, TOOK INDEPENDENT   
HISTORY OR RECORDS REVIEWED  
3)RISK: MODERATE RISK DUE TO   
NATURE OF MEDICAL   
CONDITIONS/COMORBIDITY OR   
MEDICATIONS ORDERED OR SURGICAL   
OR PROCEDURE REFERRAL, .  
  
4 weeks (depend on scope   
schedule).  
Electronically signed by Yumiko Smart MD at 2023 4:00   
PM EST  
documented in this encounter            [facility]  
Work Phone:   
[phone]  
   
                                                    10- History of   
Present illness Narrative               Formatting of this note might be   
different from the original.  
Patient: Shanice Dahl  
YOB: 1955 (68   
y.o.)  
PCP: Omar Alamo MD  
  
Procedures  
  
  
ASSESSMENT/PLAN:  
Shanice Dahl 68 y.o. female   
with history of idiopathic   
neuropathy pain of both lower   
extremities doing better with   
gabapentin.  
Contracted hammertoes 2 through   
5 bilateral feet.  
Plan: Patient was given a refill   
on the gabapentin to be taken at   
nighttime before bed. I did   
refer patient to pain management   
for her lower back pain. I   
discussed with patient her   
treatment options for the   
hammertoes were wider deeper   
shoes. I also applied gel toe   
splints and encouraged her to   
use Motrin or tylenol daily. A   
discussion of surgical   
correction was also had. I   
recommend arthrodesis of toes 2   
3 and 4 with K wire fixation and   
a arthroplasty of the fifth toe.   
Patient like to set up surgery   
for the right foot in early   
January. Reappoint in 6 weeks   
for an H&P.  
  
Assessment & plan notes cannot   
be loaded without a specified   
hospital service.  
  
SUBJECTIVE:  
History Since Last Visit:   
Patient is a 68-year-old female   
comes in for follow-up of her   
idiopathic neuropathy pain of   
both feet. Patient with the   
gabapentin helps some. Patient   
did have x-rays which showed   
arthritis in the iliac sacral   
area of her lower back. Patient   
also had an EMG last spring   
which was negative for any   
radiculopathy.  
  
Review of Systems:  
  
OBJECTIVE:  
Physical Examination:  
Integument-skin is warm dry and   
supple bilateral feet.  
Neuro-some diminished sensation   
of the toes 1 through 5   
bilateral feet with [place]   
Griselda 5.0 some monofilament.  
Musculoskeletal-contracted   
second through fifth proximal   
interphalangeal joints bilateral   
feet. The right is rigid and the   
left are semiflexible.  
Vascular-DP and PT pulses are   
palpable bilateral feet.  
/82 (BP Location: Right   
arm, Patient Position: Sitting,   
BP Cuff Size: Adult)   Pulse (!)   
59   Temp 98.1 F (36.7 C)   
(Infrared)  
  
Laboratory and Additional Data   
Reviewed:  
Reviewed:[id number]}  
XR Lumbar Spine 2-3 Views   
(Standard)  
Narrative: EXAMINATION:  
XR LUMBAR SPINE 2-3 VIEWS   
(STANDARD)  
  
HISTORY:  
lower back pain with hx of   
degenerative disc disease  
  
COMPARISON:  
None.  
Impression: FINDINGS/  
1. Anterolisthesis of L5 on S1   
measuring 1 cm.  
2. Vertebral body height is   
preserved.  
3. No acute fracture or   
dislocation.  
4. Above average stool burden of   
the right colon.  
5. Cholecystectomy.  
6. Moderate degeneration of the   
bilateral sacroiliac joints.  
  
Workstation ID: [id number]  
  
  
Electronically signed by   
Jose Maria Gonsalez DPM at   
10/26/2023 12:43 PM EDT  
documented in this encounter            [facility]  
   
                                                    10- History of   
Present illness Narrative               Formatting of this note is   
different from the original.  
Subjective  
Reason for Visit: Shanice Dahl is an 68 y.o. female here   
for a Medicare Wellness visit.  
  
Past Medical, Surgical, and   
Family History reviewed and   
updated in chart.  
  
Reviewed all medications by   
prescribing practitioner or   
clinical pharmacist (such as   
prescriptions, OTCs, herbal   
therapies and supplements) and   
documented in the medical   
record.  
  
HPI  
LAB F/U. S/P ACCIDENTAL FALL 2   
MONTHS AGO (SLIPPED ON WET FLOOR   
) WITH TRAUMA TO L ELBOW. HAS   
BEEN HAVING MILD L ELBOW PAIN   
2-3/10 ON AND OFF SINCE THEN .   
NO CHANGE IN RANGE OF MOTION.   
CHRONIC DIARRHEA ,S/P   
CHOLECYSTECTOMY. CHOLESTYRAMINE   
GIVEN BY GI HELPS BUT PT STOPPED   
TAKING IT (CAUSES BLOATING).   
MEDICARE WELLNESS EXAM.  
Patient Care Team:  
Yumiko Smart MD as PCP -   
General  
Yumiko Smart MD as PCP -   
[facility] Attributed Provider  
  
Review of Systems  
Constitutional: Negative for   
chills and fever.  
HENT: Negative. Negative for   
congestion, postnasal drip and   
rhinorrhea.  
Eyes: Negative. Negative for   
visual disturbance.  
Respiratory: Negative for cough,   
shortness of breath and   
wheezing.  
Cardiovascular: Negative.   
Negative for chest pain,   
palpitations and leg swelling.  
Gastrointestinal: Positive for   
diarrhea. Negative for abdominal   
distention, abdominal pain,   
constipation, nausea and   
vomiting.  
Endocrine: Negative.  
Genitourinary: Negative for   
dysuria and urgency.  
Musculoskeletal: Positive for   
arthralgias. Negative for back   
pain.  
Skin: Negative. Negative for   
rash.  
Allergic/Immunologic: Negative   
for immunocompromised state.  
Neurological: Negative. Negative   
for dizziness, weakness,   
light-headedness and headaches.  
Psychiatric/Behavioral:   
Negative. Negative for   
agitation.  
  
Objective  
Vitals:  
/80   Pulse 66   Ht 1.6 m   
(5' 3 )   Wt 75.3 kg (166 lb)     
SpO2 97%   BMI 29.41 kg/m  
  
Physical Exam  
Constitutional:  
General: She is not in acute   
distress.  
HENT:  
Head: Normocephalic.  
Nose: Nose normal.  
Mouth/Throat:  
Mouth: Mucous membranes are   
moist.  
Eyes:  
Conjunctiva/sclera: Conjunctivae   
normal.  
Pupils: Pupils are equal, round,   
and reactive to light.  
Cardiovascular:  
Rate and Rhythm: Normal rate and   
regular rhythm.  
Pulses: Normal pulses.  
Heart sounds: Normal heart   
sounds.  
Pulmonary:  
Effort: No respiratory distress.  
Breath sounds: No wheezing.  
Chest:  
Chest wall: No tenderness.  
Abdominal:  
General: Abdomen is flat. Bowel   
sounds are normal.  
Palpations: Abdomen is soft.  
Tenderness: There is no   
abdominal tenderness.  
Musculoskeletal:  
General: No tenderness. Normal   
range of motion.  
Cervical back: Normal range of   
motion.  
Comments: NORMAL RANGE OF MOTION   
OF L ELBOW W/O TENDERNESS , NO   
INFLAMMATION.  
Lymphadenopathy:  
Cervical: No cervical   
adenopathy.  
Skin:  
General: Skin is warm and dry.  
Findings: No rash.  
Neurological:  
General: No focal deficit   
present.  
Mental Status: She is alert.   
Mental status is at baseline.  
Psychiatric:  
Mood and Affect: Mood normal.  
Behavior: Behavior normal.  
  
Assessment/Plan  
1. Routine general medical   
examination at health care   
facility  
  
2. Encounter for screening   
mammogram for breast cancer BI   
mammo bilateral screening   
tomosynthesis  
  
3. Postmenopausal XR DEXA bone   
density  
  
4. Left elbow pain XR elbow left   
3+ views  
  
5. History of trauma XR elbow   
left 3+ views  
  
6. Chronic diarrhea  
  
7. S/P laparoscopic   
cholecystectomy  
  
8. Benign essential HTN  
  
9. Glucose intolerance  
  
  
TEST RESULTS WERE DISCUSSED.  
ADVISED TO HAVE LOW FAT AND LOW   
CALORIE DIET AND TO LOOSE   
WEIGHT, DAILY EXERCISE.  
I HIGHLY ADVISED TO RESTART THE   
CHOLESTYRAMINE. EXPLAINED CAN   
REDUCE IT TO DAILY AND GRADUALLY   
INCREASE IT TO TID FOR DIARRHEA.  
ADVISED TO APPLY WARM   
COMPRESSION AND OTC PAIN CREAM   
PRN FOR PAIN.  
ADVISED FOR FALL PRECAUTION.  
Advanced Care Planing discussed,   
diagnosis , treatment and   
prognosis discussed with pt,pt   
has capacity to make own   
decision, pt has a living will,   
to bring a copy for the chart.  
  
MDM  
  
1) COMPLEXITY: 1 UNDIAGNOSED NEW   
PROBLEM WITH UNCERTAIN PROGNOSIS  
2)DATA: TESTS INTERPRETED AND OR   
ORDERED, TOOK INDEPENDENT   
HISTORY OR RECORDS REVIEWED  
3)RISK: MODERATE RISK DUE TO   
NATURE OF MEDICAL   
CONDITIONS/COMORBIDITY OR   
MEDICATIONS ORDERED OR SURGICAL   
OR PROCEDURE REFERRAL, .  
  
2 mon  
  
Electronically signed by Yumiko Smart MD at 10/05/2023 2:30   
PM EDT  
documented in this encounter            [facility]  
Work Phone:   
[phone]  
   
                                                    2023 History of   
Present illness Narrative               Formatting of this note is   
different from the original.  
Patient Name: Shanice Dahl  
MR #: [id number]  
Acct #: [id number]  
: 1955  
Gender: female.  
Date of Consultation: 2023.  
Author: ANA Lee  
  
Physicians: Omar Alamo MD (Family); No ref. provider   
found (Referring)  
  
History of Present Illness:   
Shanice Dahl is a 68 y.o.   
female who presents with   
idiopathic neuropathy pain of   
both feet that bother her at   
nighttime. Patient states this   
has been going on for over 5   
years. Patient does have a   
history of lower back   
degenerative arthritis in which   
she sees a chiropractor   
regularly for. Patient's been on   
gabapentin in the past but   
nothing here recently because   
she was getting drowsiness   
symptoms with higher doses.  
  
Assessment and Plan:  
1.  
1. Other idiopathic peripheral   
autonomic neuropathy XR Lumbar   
Spine 2-3 Views (Standard)  
Glucose  
T4, Free  
B12/Folate  
Vitamin B6  
  
2. Hammer toes, bilateral   
Glucose  
T4, Free  
B12/Folate  
Vitamin B6  
  
3. Lumbar back pain with   
radiculopathy affecting lower   
extremity XR Lumbar Spine 2-3   
Views (Standard)  
Glucose  
T4, Free  
B12/Folate  
Vitamin B6  
  
  
Plan: Patient was seen and   
evaluated. I discussed the   
findings with the patient.   
Patient was given opportunity to   
ask questions. Patient elects to   
have the following treatment as   
follows:  
I prescribed gabapentin 100 mg   
at nighttime before bed to help   
with neuropathy symptoms at   
night  
I have ordered x-rays of lower   
back to evaluate her disc   
disease.  
I have also ordered a battery of   
blood tests to check for the   
etiology of these neuropathy   
symptoms.  
Reappoint in 1 month  
  
Lower Extremity:  
Integumentary: Skin is warm dry   
and supple bilateral feet. There   
is no wounds, redness or   
ecchymosis noted.  
Musculoskeletal: Contracted   
hammertoes 2 through 4 bilateral   
feet. Patient had no pain on the   
plantar aspects of either heel   
today.  
Neurological: Patient missed 2   
[place] Griselda 5.0 some   
monofilament's on each forefoot   
when checked. Patient could feel   
the tuning fork over the great   
toe joints.  
Vascular: DP and PT pulses are 1   
of 4 bilateral feet.  
  
* No LDAs found *  
  
/88 (BP Location: Right   
arm, Patient Position: Sitting,   
BP Cuff Size: X-large Adult)     
Pulse 75   Temp 97.7 F (36.5 C)   
(Infrared)  
  
Allergy Information:  
I have reviewed the patient's   
allergies.  
Milk, Mold, Pollen extracts, and   
Whey  
  
Home Medications:  
Current Outpatient Medications  
Medication Sig Dispense Refill  
amLODIPine (NORVASC) 5 MG tablet  
cyanocobalamin (B-12) 1000 MCG   
tablet Take 1 (one) tablet   
(1,000 mcg total) by mouth daily   
.  
famotidine (PEPCID) 20 MG tablet  
fexofenadine-pseudoePHEDrine   
(ALLEGRA-D 24) 180-240 mg per 24   
hr tablet Take 180 mg by mouth .  
metoprolol tartrate (LOPRESSOR)   
25 MG tablet  
multivitamin (THERAGRAN) per   
tablet Take by mouth daily .  
omeprazole (PRILOSEC) 40 MG   
capsule  
cholestyramine-aspartame   
(QUESTRAN LIGHT) 4 gram PwPk   
Take 1 (one) packet by mouth 2   
(two) times a day with meals .  
gabapentin (NEURONTIN) 100 MG   
capsule Take 1 (one) capsule   
(100 mg total) by mouth every   
night at bedtime . 30 capsule 1  
  
No current facility-administered   
medications for this visit.  
  
Review of Systems:  
The following system(s) were   
reviewed and pertinent findings   
noted:  
Pertinent positives and   
negatives as mentioned above,   
otherwise full review of systems   
is negative unless mentioned   
below: Patient currently denies   
Nausea/Vomiting/Fever/Chills/Gloria  
rtness of Breath/Chest Pain.  
  
Medical History:  
Past Medical History:  
Diagnosis Date  
Acid reflux  
Hypertension  
Low iron  
.  
Surgical History:  
Past Surgical History:  
Procedure Laterality Date  
CARPAL TUNNEL RELEASE  
CHOLECYSTECTOMY  
STOMACH SURGERY N/A  
STOMACH UNLISTED PROCEDURE  
[employer] proceedure  
.  
Social History:  
Social History  
  
Socioeconomic History  
Marital status:   
Tobacco Use  
Smoking status: Never  
Smokeless tobacco: Never  
Substance and Sexual Activity  
Alcohol use: Yes  
Drug use: Never  
  
Family History: History   
reviewed. No pertinent family   
history.  
  
Electronically signed by the   
above physician 23  
Electronically signed by   
Jose Maria Gonsalez DPM at   
2023 10:41 AM EDT  
documented in this encounter            [facility]  
   
                                                    2023 History of   
Present illness Narrative               Formatting of this note is   
different from the original.  
Subjective  
Patient ID: Shanice Dahl is   
a 67 y.o. female who presents   
for Follow-up (3 WEEK F/U.   
PATIENT HAD CHOLECYSTECTOMY   
23. STILL HAS).  
HPI  
F/U ON ABDOMINAL / BACK PAIN   
which IS RESOLVED AFTER   
CHOLECYSTECTOMY 2 WEEKS AGO.   
STILL HAS DIARRHEA ON AND OFF   
,BUT TOLERATES THE FOOD BETTER.   
WORSENING CHRONIC COLD   
INTOLERANCE .  
Review of Systems  
Constitutional: Negative for   
chills and fever.  
HENT: Negative. Negative for   
congestion, postnasal drip and   
rhinorrhea.  
Eyes: Negative. Negative for   
visual disturbance.  
Respiratory: Negative for cough,   
shortness of breath and   
wheezing.  
Cardiovascular: Negative.   
Negative for chest pain,   
palpitations and leg swelling.  
Gastrointestinal: Positive for   
diarrhea. Negative for abdominal   
distention, abdominal pain,   
constipation, nausea and   
vomiting.  
Endocrine: Positive for cold   
intolerance.  
Genitourinary: Negative for   
dysuria and urgency.  
Musculoskeletal: Negative.   
Negative for back pain.  
Skin: Negative. Negative for   
rash.  
Allergic/Immunologic: Negative   
for immunocompromised state.  
Neurological: Negative. Negative   
for dizziness, weakness,   
light-headedness and headaches.  
Psychiatric/Behavioral:   
Negative. Negative for   
agitation.  
  
Objective  
Physical Exam  
Constitutional:  
General: She is not in acute   
distress.  
HENT:  
Head: Normocephalic.  
Nose: Nose normal.  
Mouth/Throat:  
Mouth: Mucous membranes are   
moist.  
Eyes:  
Conjunctiva/sclera: Conjunctivae   
normal.  
Pupils: Pupils are equal, round,   
and reactive to light.  
Cardiovascular:  
Rate and Rhythm: Normal rate and   
regular rhythm.  
Pulses: Normal pulses.  
Heart sounds: Normal heart   
sounds.  
Pulmonary:  
Effort: No respiratory distress.  
Breath sounds: No wheezing.  
Chest:  
Chest wall: No tenderness.  
Abdominal:  
General: Abdomen is flat. Bowel   
sounds are normal.  
Palpations: Abdomen is soft.  
Tenderness: There is no   
abdominal tenderness.  
Comments: HEALED SURGICAL SCARS   
OF ABDOMEN.  
Musculoskeletal:  
General: No tenderness. Normal   
range of motion.  
Cervical back: Normal range of   
motion.  
Lymphadenopathy:  
Cervical: No cervical   
adenopathy.  
Skin:  
General: Skin is warm and dry.  
Findings: No rash.  
Neurological:  
General: No focal deficit   
present.  
Mental Status: She is alert.   
Mental status is at baseline.  
Psychiatric:  
Mood and Affect: Mood normal.  
Behavior: Behavior normal.  
  
Assessment/Plan  
1. Benign essential HTN   
Comprehensive Metabolic Panel  
  
2. Cold sensitivity TSH with   
reflex to Free T4 if abnormal  
Magnesium  
  
3. Impaired fasting glucose   
Hemoglobin A1C  
  
4. S/P laparoscopic   
cholecystectomy  
  
ADVISED TO HAVE LOW FAT AND LOW   
CALORIE DIET AND TO LOOSE   
WEIGHT, DAILY EXERCISE.  
ADVISED TO TAKE OTC MULTIVITAMIN   
DAILY AND TO DRESS UP IN LAYERS.  
MDM  
  
1) COMPLEXITY: 1 UNDIAGNOSED NEW   
PROBLEM WITH UNCERTAIN PROGNOSIS  
2)DATA: TESTS INTERPRETED AND OR   
ORDERED, TOOK INDEPENDENT   
HISTORY OR RECORDS REVIEWED  
3)RISK: MODERATE RISK DUE TO   
NATURE OF MEDICAL   
CONDITIONS/COMORBIDITY OR   
MEDICATIONS ORDERED OR SURGICAL   
OR PROCEDURE REFERRAL, .  
  
4 MON  
Electronically signed by Yumiko Smart MD at 2023 6:37   
PM EDT  
documented in this encounter            [facility]  
Work Phone:   
[phone]  
   
                                        2023 Note     PROCEDURE DETAILS  
  
Postoperative Diagnosis:  
Chronic cholecystitis  
History of sleeve gastrectomy   
and Lynx placement  
Surgeon: Sanford Bardales  
Resident/Fellow/Other Assistant:   
Laura Ferrell  
  
Procedure:  
1. Cholecystectomy Laparoscopic  
  
Anesthesia: No anesthesiologist   
associated with this case  
Estimated Blood Loss: 10 cc  
Findings: Findings consistent   
with chronic cholecystitis with   
scarring around  
the base of the gallbladder. No   
stones within the gallbladder  
Specimens(s) Collected: yes,   
Gallbladder  
  
  
  
Operative Report:  
Indication for procedure: The   
patient is a 67-year-old woman   
with a surgical  
history of a laparoscopic sleeve   
gastrectomy in 2008 followed by   
a laparoscopic  
hiatal hernia repair and Lynx   
placement in 2019 followed by   
revision of the  
Lynx in  who presented to   
the emergency department with a   
several month  
history of right upper quadrant   
abdominal pain and postprandial   
abdominal pain  
with radiation to her back. She   
had undergone extensive work-up   
as both an  
outpatient and in the emergency   
department. There were no clear   
stones on her  
CT scans or ultrasounds, however   
her symptoms were fairly   
consistent with  
chronic cholecystitis.   
Additionally, she did have   
issues with chronic  
diarrhea. I explained to her   
that taking out the gallbladder   
may not fix all  
of the issues she is having with   
her chronic diarrhea, but take   
out the  
gallbladder could help with some   
of the postprandial abdominal   
pain in her  
right upper quadrant. I   
explained the risks and benefits   
of the procedure  
including risk of bleeding,   
infection, injury to bile ducts,   
or injury to  
surrounding structures. The   
patient agreed to proceed with   
the operation.  
  
Description of the procedure:   
The patient was brought to the   
operating room,  
placed supine on the operative   
table, and general endotracheal   
anesthesia was  
induced. Her abdomen was then   
prepped and draped sterilely. We   
began by  
making an incision in the left   
upper quadrant and used an   
optical view trocar  
to gain access the abdomen. The   
abdomen was then insufflated 15   
mmHg. We  
placed a Jiang trocar   
supraumbilically. We placed 2   
additional 5 mm ports in  
the right upper quadrant. We   
then identified the gallbladder   
and retracted it  
up over the liver, exposing the   
hepatocystic triangle. Then   
through meticulous  
dissection we took off the   
peritoneum over the base of the   
gallbladder and  
identified the cystic duct. The   
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   
chronically  
occluded. We removed the   
remainder of the gallbladder   
from the gallbladder  
fossa and were left with just   
the cystic duct as the only   
structure going into  
the gallbladder, thus achieving   
the critical view of safety. We   
placed 2 clips  
proximally and 1 clip distally   
on the cystic duct and divided   
it. We placed  
the gallbladder into an Endo   
Catch bag and removed it from   
the abdomen. We  
inspected the gallbladder fossa   
and noted good hemostasis. We   
closed the  
supraumbilical incision using 0   
Vicryl suture. The main skin   
incisions were  
closed with 4-0 Monocryl and   
Dermabond for the skin. At the   
end of the  
procedure all needle, sponge,   
and instrument counts were   
correct. The patient  
tolerated the procedure well and   
was brought to the   
postanesthesia care unit  
for recovery.  
  
  
  
  
  
  
  
  
  
  
  
Attestation:  
Note Completion:  
Attending AttestationI performed   
the procedure without a resident  
  
  
  
Electronic Signatures:  
Sanford Bardales) (Signed   
2023 18:42)  
Authored: Post-Operative Note,   
Chart Review, Note Completion  
  
  
Last Updated: 2023 18:42   
by Sanford Bardales)                 [facility]  
   
                                        2023 Note     History of Present I  
llness:  
History Present Illness:  
Reason for surgery: Gallbladder  
HPI:  
67-year-old woman with extensive   
past surgical history including   
a laparoscopic  
sleeve gastrectomy in    
followed by Lynx procedure in   
2019, followed by Lynx  
revision and hiatal hernia   
repair in 2020. She presents   
with multi month  
symptoms of right upper quadrant   
abdominal pain that radiates   
around to her  
right flank and right back.   
Additionally she has been having   
chronic diarrhea.  
She has had extensive work-up   
back with her PCP as well as   
with Dr. Cheema.  
No clear stones on her   
ultrasounds, but symptoms do   
appear to be consistent  
with biliary dyskinesia. She   
presented to the emergency   
department with  
worsening abdominal pain   
overnight.  
  
Allergies:  
Allergies:  
No Known Allergies:  
  
Home Medication Review:  
Home Medications Reviewed: yes  
  
Impression/Procedure:  
Impression and Planned   
Procedure: 67-year-old with   
multiple prior surgeries  
presenting with chronic right   
upper quadrant abdominal pain   
and diarrhea. I  
explained to her the risks and   
benefits going to operating for   
a laparoscopic  
cholecystectomy. These risk and   
risk of bleeding, infection,   
injury to the  
bile ducts, or injury to   
surround structures. The patient   
agrees to proceed  
with the operation. Additionally   
I explained to the patient that   
take-out the  
gallbladder may not resolve all   
of her diarrhea issues and she   
is aware of this  
but would still like to go   
through with laparoscopic   
cholecystectomy.  
  
  
ERAS (Enhanced Recovery After   
Surgery):  
ERAS Patient: no  
  
  
Vital Signs:  
Temperature C: 36.6 degrees C  
Temperature F: 97.8 degrees F  
Heart Rate: 81 beats per minute  
Respiratory Rate: 20 breath per   
minute  
Blood Pressure Systolic: 162   
mm/Hg  
Blood Pressure Diastolic: 100   
mm/Hg  
  
Physical Exam by System:  
  
Constitutional: Well developed,   
awake/alert/oriented x3, no   
distress, alert and  
cooperative  
Respiratory/Thorax: Patent   
airways,  
Cardiovascular: Regular, rate  
Gastrointestinal: TTP in the RUQ   
with possible Cruz's sign  
Skin: Warm and dry, no lesions,   
no rashes  
  
Consent:  
COVID-19 Consent:  
COVID-19 Risk ConsentSurgeon has   
reviewed key risks related to   
the risk of  
mathew COVID-19 and if they   
contract COVID-19 what the risks   
are.  
  
  
Electronic Signatures:  
Sanford Bardales) (Signed   
2023 17:18)  
Authored: History of Present   
Illness, Allergies, Home   
Medication Review,  
Impression/Procedure, ERAS,   
Physical Exam, Consent, Note   
Completion  
  
  
Last Updated: 2023 17:18   
by Sanford Bardales)                 [facility]  
   
                                                    04- History of   
Present illness Narrative               Formatting of this note is   
different from the original.  
Images from the original note   
were not included.  
  
[facility] -   
Neurology  
[address], [address] floor  
[place] [address]  
[phone]  
  
Nerve Conduction & EMG Report  
  
  
Patient: SHANICE DAHL  
Patient ID: [id number]  
Sex: Female  
YOB: 1955  
  
Visit Date: 4/10/2023 9:14 AM  
Age: 67 Years  
Examining MD: Amilcar Elias MD  
Referred by: Dr. SMART  
Temperature: 31.7  
Current Height: 5 feet 4 inch  
Referred for: BLE numbness and   
paresthesias for years. + LBP.   
No DM.  
  
Plan: The study was design to   
evaluate for entrapment   
neuropathy, polyneuropathy,   
radiculopathy, or plexopathy.   
Procedure indication, risk,   
complications, side effects and   
alternatives were explained.   
Patient agreed to proceed with   
verbal consent. Patient was   
instructed to clean the puncture   
site with soap and water and put   
some ice pack for bruising.  
  
Impression: There is NO clear   
electrodiagnostic evidence of a   
bilateral lumbosacral   
radiculopathy, plexopathy, or   
diffuse sensorimotor   
polyneuropathy at this time.  
  
EMG Summary: The bilateral   
peroneal and tibial motor nerve   
conduction studies were normal.   
The bilateral sural and   
superficial peroneal sensory   
nerve conduction studies were   
also normal. The bilateral H   
reflex was normal.  
  
Needle EMG of the muscle tested   
showed no abnormal spontaneous   
activity. Normal motor unit   
action potentials and   
recruitment patterns were seen.  
  
Amilcar Elias MD  
Diplomate, ABPN, NBPAS  
Clinical Neurophysiology,   
Neurology, Vascular Neurology   
and Sleep Medicine  
[facility], [place], [place]  
[phone]  
  
  
  
Motor NCS  
  
Nerve / Sites Muscle Latency   
Amplitude Distance Velocity  
ms mV cm m/s  
R Deep peroneal (Fibular) - EDB  
Ankle EDB 5.23 3.0 8.5  
Fib Head EDB 11.85 2.6 29.5 44.5  
Knee EDB 13.54 3.1 7 41.5  
L Deep peroneal (Fibular) - EDB  
Ankle EDB 5.33 3.3 8.5  
Fib Head EDB 12.17 3.1 30 43.9  
Knee EDB 13.52 3.1 7 51.7  
R Tibial - AH  
Ankle AH 4.04 6.4 8  
Knee AH 13.85 5.8 40 40.8  
L Tibial - AH  
Ankle AH 4.69 7.7 8  
Knee AH 13.40 5.4 38.5 44.2  
  
Sensory NCS  
  
Nerve / Sites Peak Amp Amp.2-3   
Distance Velocity  
ms V V cm m/s  
R Sural - Lat Mall  
Calf 3.75 13.6 4.8 14 54  
L Sural - Lat Mall  
Calf 3.83 10.7 5.0 14 56  
R Superficial peroneal - Ankle  
Lat leg 3.19 7.0 3.1 14 62  
L Superficial peroneal - Ankle  
Lat leg 3.25 13.0 2.0 14 60  
  
H Reflex  
  
Nerve H Lat  
ms  
R Tibial - Soleus 32.97  
L Tibial - Soleus 34.64  
  
EMG Summary Table  
Spontaneous Activity Amplitude   
Duration Recruitment Polyphasia   
Comment  
Muscle Ins Act Fib PSW Fasc - -   
- - -  
L. Vastus lateralis Normal 0 0 0   
Normal Normal Normal Normal   
Normal  
L. Semitendinosus Normal 0 0 0   
Normal Normal Normal Normal   
Normal  
L. Tibialis anterior Normal 0 0   
0 Normal Normal Normal Normal   
Normal  
L. Gastrocnemius (Medial head)   
Normal 0 0 0 Normal Normal   
Normal Normal Normal  
L. Abductor hallucis Normal 0 0   
0 Normal Normal Normal Normal   
Normal  
L. Lumbar paraspinals Normal 0 0   
0 Normal Normal Normal Normal   
Normal  
R. Vastus lateralis Normal 0 0 0   
Normal Normal Normal Normal   
Normal  
R. Semitendinosus Normal 0 0 0   
Normal Normal Normal Normal   
Normal  
R. Tibialis anterior Normal 0 0   
0 Normal Normal Normal Normal   
Normal  
R. Gastrocnemius (Medial head)   
Normal 0 0 0 Normal Normal   
Normal Normal Normal  
R. Abductor hallucis Normal 0 0   
0 Normal Normal Normal Normal   
Normal  
R. Lumbar paraspinals Normal 0 0   
0 Normal Normal Normal Normal   
Normal  
  
  
  
Electronically signed by Amilcar Elias MD at 04/10/2023 9:20   
AM EDT  
documented in this encounter            [facility]  
   
                                                    2023 History of   
Present illness Narrative               Formatting of this note is   
different from the original.  
Subjective  
Patient ID: Shanice Dahl is   
a 67 y.o. female who presents   
for Follow-up (3 WEEK FOLLOW UP-   
PREDNISONE COMPLETE. STILL   
HAVING DIARRHEA AFTER EATING.   
SEES DIGESTIVE DR NEXT WEEK.).  
HPI  
CHRONIC DIARRHEA ,F/U ON HTN .   
ALTERNATIONS FROM NORMAL STOOL   
TO LIQUID) ON A DAILY BASIS.   
FATIGUE, ABDOMINAL BLOATING .HAS   
BEEN FOLLOWING DAIRY FREE   
,GLUTEN FREE DIET. DENIES HAVING   
ABDOMINAL PAIN ,EXCEPT MILD PAIN   
2/10 OF RUQ AND LUQ . PRN   
ANTICHOLINERGIC CLASS DIDN'T   
HELP. PENDING LABS FOR UPCOMING   
GI VISIT. PENDING FOOD ALLERGY   
TEST IN 1 MONTH.  
Review of Systems  
Constitutional: Negative for   
chills and fever.  
HENT: Negative. Negative for   
congestion, postnasal drip and   
rhinorrhea.  
Eyes: Negative. Negative for   
visual disturbance.  
Respiratory: Negative for cough,   
shortness of breath and   
wheezing.  
Cardiovascular: Negative.   
Negative for chest pain,   
palpitations and leg swelling.  
Gastrointestinal: Positive for   
diarrhea. Negative for abdominal   
distention, abdominal pain,   
constipation, nausea and   
vomiting.  
Endocrine: Negative.  
Genitourinary: Negative for   
dysuria and urgency.  
Musculoskeletal: Negative.   
Negative for back pain.  
Skin: Negative. Negative for   
rash.  
Allergic/Immunologic: Negative   
for immunocompromised state.  
Neurological: Negative. Negative   
for dizziness, weakness,   
light-headedness and headaches.  
Psychiatric/Behavioral:   
Negative. Negative for   
agitation.  
  
Objective  
Physical Exam  
Constitutional:  
General: She is not in acute   
distress.  
HENT:  
Head: Normocephalic.  
Nose: Nose normal.  
Mouth/Throat:  
Mouth: Mucous membranes are   
moist.  
Eyes:  
Conjunctiva/sclera: Conjunctivae   
normal.  
Pupils: Pupils are equal, round,   
and reactive to light.  
Cardiovascular:  
Rate and Rhythm: Normal rate and   
regular rhythm.  
Pulses: Normal pulses.  
Heart sounds: Normal heart   
sounds.  
Pulmonary:  
Effort: No respiratory distress.  
Breath sounds: No wheezing.  
Chest:  
Chest wall: No tenderness.  
Abdominal:  
General: Abdomen is flat. Bowel   
sounds are normal.  
Palpations: Abdomen is soft.  
Tenderness: There is abdominal   
tenderness.  
Comments: RUQ AND LUQ TENDERNESS   
(AT THE FAR CORNERS OF ABDOMEN,   
ON BOTH SIDES) .  
Musculoskeletal:  
General: No tenderness. Normal   
range of motion.  
Cervical back: Normal range of   
motion.  
Lymphadenopathy:  
Cervical: No cervical   
adenopathy.  
Skin:  
General: Skin is warm and dry.  
Findings: No rash.  
Neurological:  
General: No focal deficit   
present.  
Mental Status: She is alert.   
Mental status is at baseline.  
Psychiatric:  
Mood and Affect: Mood normal.  
Behavior: Behavior normal.  
  
Assessment/Plan  
Problem List Items Addressed   
This Visit  
  
  
Circulatory  
Benign essential HTN  
  
Digestive  
Bariatric surgery status  
Chronic diarrhea - Primary  
Chronic gastritis  
  
Other  
Abdominal bloating  
  
ADVISED FOR SMALLER MEAL   
PORTIONS MORE FREQUENT SERVINGS   
AND TO HAVE MORE GATORADE ,AND   
TO HAVE LOW FAT DIET. CUT BACK   
ON CAFFEINE.  
Lose weight.  
Do not overeat. Eat small   
portions at meals and snacks.  
Avoid tight clothing and   
tight-fitting belts. Do not lie   
down or bend over within the   
first 15-30 minutes after   
eating.  
Do not chew gum or suck on hard   
candy. Swallowing air with   
chewing gum and sucking on hard   
candy can cause belching and   
reflux.  
Raise the head of your bed 6-8   
inches.  
Do not eat/drink: chocolate,   
tomatoes, tomato sauces,   
oranges, pineapple, grapefruit,   
mints, coffee, alcohol,   
carbonated beverages, and black   
pepper.  
Eat a low fat diet. Fatty and   
greasy foods cause your stomach   
to produce more acid.  
  
MDM  
  
1) COMPLEXITY: MORE THAN 1   
STABLE CHRONIC CONDITION   
ADDRESSED  
2)DATA: TESTS INTERPRETED AND OR   
ORDERED, TOOK INDEPENDENT   
HISTORY OR RECORDS REVIEWED  
3)RISK: MODERATE RISK DUE TO   
NATURE OF MEDICAL   
CONDITIONS/COMORBIDITY OR   
MEDICATIONS ORDERED OR SURGICAL   
OR PROCEDURE REFERRAL, .  
  
3 weeks  
Electronically signed by Yumiko Smart MD at 2023 9:08   
AM EDT  
documented in this encounter            [facility]  
Work Phone:   
[phone]  
   
                                                    2023 History of   
Present illness Narrative               PASSING LARGE AMOUNT OF BRIGHT   
RED BLOOD PER RECTUM LAST WEEK   
W/O PASSING THE STOOL . HAD   
DIARRHEA EARLIER . DENIES HAVING   
ABDOMINAL PAIN OR DIZZINESS. HAS   
BEEN TAKING THE OTC ASA 81 MG   
DAILY FOR FAMILY H/O CAD.   
CURRENTLY DOESN'T TAKE THE IRON   
SUPPLEMENT . NAUSEA OR VOMITING.        [facility]  
Work Phone:   
[phone]  
   
                                                    2022 History of   
Present illness Narrative               PASSING LARGE AMOUNT OF BRIGHT   
RED BLOOD PER RECTUM LAST WEEK   
W/O PASSING THE STOOL . HAD   
DIARRHEA EARLIER . DENIES HAVING   
ABDOMINAL PAIN OR DIZZINESS. HAS   
BEEN TAKING THE OTC ASA 81 MG   
DAILY FOR FAMILY H/O CAD.   
CURRENTLY DOESN'T TAKE THE IRON   
SUPPLEMENT . NAUSEA OR VOMITING.        [facility]   
Medicine  
Work Phone:   
[phone]  
   
                                                    2022 History of   
Present illness Narrative               PASSING LARGE AMOUNT OF BRIGHT   
RED BLOOD PER RECTUM LAST WEEK   
W/O PASSING THE STOOL . HAD   
DIARRHEA EARLIER . DENIES HAVING   
ABDOMINAL PAIN OR DIZZINESS. HAS   
BEEN TAKING THE OTC ASA 81 MG   
DAILY FOR FAMILY H/O CAD.   
CURRENTLY DOESN'T TAKE THE IRON   
SUPPLEMENT . NAUSEA OR VOMITING.        [facility]  
Work Phone:   
[phone]  
   
                                                    04- History of   
Present illness Narrative               F/U ON EGD AND COLONOSCOPY .   
COMPLAINS OF R LOWE ABDOMINAL   
AND SUPRAPUBIC PAIN ON AND OFF   
4/10 X SEVERAL DAYS.                    [facility]  
Work Phone:   
[phone]  
   
                                                    2021 Chief complaint   
Narrative - Reported                    An interactive audio and video   
telecommunication system which   
permits real time communications   
between the patient (at the   
originating site) and provider   
(at the distant site) was   
utilized to provide this   
telehealth service.Verbal   
consent was requested and   
obtained from SHANICE DAHL on   
this date, 2021 09:00 AM ,   
for a telehealth visit.VIRTUAL   
VISIT - 6 DAY F/U COVID. PATIENT   
STATES SHE IS FEELING MUCH   
BETTER. STILL HAS COUGH AND   
FATIGUE - USING ROBITUSSIN AND   
TESSALON PRN.                           [facility]  
Work Phone:   
[phone]  
   
                                                    2021 Chief complaint   
Narrative - Reported                    An interactive audio and video   
telecommunication system which   
permits real time communications   
between the patient (at the   
originating site) and provider   
(at the distant site) was   
utilized to provide this   
telehealth service.Verbal   
consent was requested and   
obtained from SHANICE DAHL on   
this date, 2021 09:00 AM ,   
for a telehealth visit.VIRTUAL   
VISIT - 6 DAY F/U COVID. PATIENT   
STATES SHE IS FEELING MUCH   
BETTER. STILL HAS COUGH AND   
FATIGUE - USING ROBITUSSIN AND   
TESSALON PRN.                           [facility]  
Work Phone:   
[phone]  
  
  
  
                                          
   
                                          
   
                                          
  
documented in this encounter  
[facility]   
  
                                                    Diagnosis  
   
                                                      
  
  
Chronic diarrhea- Primary  
  
  
Diarrhea  
   
                                                      
  
  
Benign essential HTN  
   
                                                      
  
  
Abdominal bloating  
  
  
Flatulence, eructation, and gas pain  
   
                                                      
  
  
Bariatric surgery status  
   
                                                      
  
  
Chronic gastritis without bleeding, unspecified gastritis type  
   
                                                      
  
  
Chronic fatigue  
  
  
Other malaise and fatigue  
  
documented in this encounter  
[facility]  
Work Phone: [phone] note*   
  
                                                    Diagnosis  
   
                                                      
  
  
Numbness- Primary  
  
  
Disturbance of skin sensation  
  
documented in this encounter  
[facility]   
  
                                                    Diagnosis  
   
                                                      
  
  
Benign essential HTN- Primary  
   
                                                      
  
  
Cold sensitivity  
  
  
Other general symptoms  
   
                                                      
  
  
Impaired fasting glucose  
   
                                                      
  
  
S/P laparoscopic cholecystectomy  
  
  
Other postprocedural status  
  
documented in this encounter  
[facility]  
Work Phone: [phone] note*   
  
                                                    Diagnosis  
   
                                                      
  
  
Other idiopathic peripheral autonomic neuropathy- Primary  
   
                                                      
  
  
Hammer toes, bilateral  
   
                                                      
  
  
Lumbar back pain with radiculopathy affecting lower extremity  
  
documented in this encounter  
[facility]   
  
                                                    Diagnosis  
   
                                                      
  
  
Routine general medical examination at health care facility- Primary  
  
  
Routine general medical examination at a health care facility  
   
                                                      
  
  
Encounter for screening mammogram for breast cancer  
   
                                                      
  
  
Postmenopausal  
  
  
Asymptomatic postmenopausal status (age-related) (natural)  
   
                                                      
  
  
Left elbow pain  
  
  
Pain in joint, upper arm  
   
                                                      
  
  
History of trauma  
   
                                                      
  
  
Chronic diarrhea  
  
  
Diarrhea  
   
                                                      
  
  
S/P laparoscopic cholecystectomy  
  
  
Other postprocedural status  
   
                                                      
  
  
Benign essential HTN  
   
                                                      
  
  
Glucose intolerance  
  
documented in this encounter  
[facility]  
Work Phone: [phone] note*   
  
                                                    Diagnosis  
   
                                                      
  
  
Lumbar back pain with radiculopathy affecting lower extremity- Primary  
   
                                                      
  
  
Other idiopathic peripheral autonomic neuropathy  
   
                                                      
  
  
Hammer toes, bilateral  
  
documented in this encounter  
[facility]   
  
                                                    Diagnosis  
   
                                                      
  
  
Acquired hammer toe deformity of lesser toe of right foot- Primary  
   
                                                      
  
  
Other idiopathic peripheral autonomic neuropathy  
   
                                                      
  
  
Lumbar back pain with radiculopathy affecting lower extremity  
  
documented in this encounter  
[facility]   
  
                                                    Diagnosis  
   
                                                      
  
  
Bunion of great toe of right foot- Primary  
  
  
Bunion  
   
                                                      
  
  
Acquired hammer toe deformity of lesser toe of right foot  
   
                                                      
  
  
Acquired hammer toe deformity of lesser toe of right foot  
  
[facility] in this encounter  
[facility]   
  
                                                    Diagnosis  
   
                                                      
  
  
Lumbar back pain with radiculopathy affecting lower extremity- Primary  
   
                                                      
  
  
Bunion of right foot  
  
  
Bunion  
   
                                                      
  
  
Acquired hammertoe of right foot  
   
                                                      
  
  
Acquired hammer toe deformity of lesser toe of right foot- Primary  
   
                                                      
  
  
Acquired hammer toe deformity of lesser toe of right foot  
  
documented in this encounter  
[facility]   
  
                                                    Diagnosis  
   
                                                      
  
  
Chronic diarrhea- Primary  
  
  
Diarrhea  
   
                                                      
  
  
Gastroesophageal reflux disease without esophagitis  
  
  
Esophageal reflux  
   
                                                      
  
  
S/P laparoscopic cholecystectomy  
  
  
Other postprocedural status  
   
                                                      
  
  
Bariatric surgery status  
   
                                                      
  
  
Poor appetite  
  
  
Anorexia  
   
                                                      
  
  
Benign essential HTN  
   
                                                      
  
  
Allergic rhinitis, unspecified seasonality, unspecified trigger  
   
                                                      
  
  
Chronic fatigue  
  
  
Other malaise and fatigue  
   
                                                      
  
  
Weight loss  
  
  
Loss of weight  
  
documented in this encounter  
[facility]  
Work Phone: [phone] note*   
  
                                                    Diagnosis  
   
                                                      
  
  
Acquired hammer toe deformity of lesser toe of right foot- Primary  
   
                                                      
  
  
Bunion of great toe of right foot  
  
  
Bunion  
   
                                                      
  
  
Lumbar back pain with radiculopathy affecting lower extremity  
  
documented in this encounter  
[facility]   
  
                                                    Diagnosis  
   
                                                      
  
  
Chronic diarrhea  
  
  
Diarrhea  
   
                                                      
  
  
S/P laparoscopic cholecystectomy  
  
  
Other postprocedural status  
   
                                                      
  
  
Gastroesophageal reflux disease without esophagitis  
  
  
Esophageal reflux  
   
                                                      
  
  
Bariatric surgery status  
   
                                                      
  
  
Poor appetite  
  
  
Anorexia  
  
documented in this encounter  
[facility]  
Work Phone: [phone] note*   
  
                                                    Diagnosis  
   
                                                      
  
  
Gastroesophageal reflux disease, unspecified whether esophagitis present- 
Primary  
   
                                                      
  
  
GERD (gastroesophageal reflux disease)  
  
  
Esophageal reflux  
  
documented in this encounter  
[facility]  
Work Phone: [phone] of Present illness NarrativeLAB F/U ,WHICH 
WASN'T DONE. WORSENING GERD WITH DUMPING SYN. S/P REMOTE BARIATRIC SURGERY. 
ABDOMINAL BLOATING.[facility]  
Work Phone: [phone] of Present illness NarrativeF/U ON LABS, BONE 
DENSITY AND MAMMO. HAS NO COMPLAINTS. BP AT HOME /70.[facility]  
Work Phone: [phone] of Present illness NarrativeF/U ON LABS, BONE 
DENSITY AND MAMMO. HAS NO COMPLAINTS. BP AT HOME /70.[facility]  
Work Phone: [phone] of Present illness Narrative* VIRTUAL VISIT 
  DUE TO COVID19 .  
* F/U ON COVID INFECTION .SOB IMPROVED , COUGH IS BACK TO BASELINE WITH HAVING 
  ASTHMA. HAD 14 DAYS OFQUARANTINE , PT HAD DOSAGES OF COVID VACCINE , ENDING 
  THE BOOSTER VACCINE .  
[facility]  
Work Phone: [phone] of Present illness NarrativeF/U ON HTN , HAS 
NO COMPLAINTS.[facility]  
Work Phone: [phone] of Present illness NarrativeWATERY DIARRHEA 
FOR MONTHS WITH ABDOMINAL BLOATING AND CRAMPING . DIARRHEA HAS NO RELATIONSHIP 
WITHFOOD. CLEAR WATERY EYES X 2 WEEKS W/O CHANGE IN VISION OR PAIN.[facility] 
[facility]  
Work Phone: [phone] of Present illness NarrativeF/U ON CT OF 
ABDOMEN AND PELVIS. HAD LUQ ABDOMINAL PAIN 3/10 ON AND OFF WITH BLOATING. LLQ 
PAIN IS RESOLVED. PRN LEVSIN HELPS WITH DIARRHEA . CHRONIC FATIGUE.[facility]  
Work Phone: [phone] of Present illness NarrativeLAB F/U WHICH 
WASN'T DONE . DRY MOUTH , WEIGHT LOSS. CHRONIC DIARRHEA IS IMPROVING , PT 
NOTICED THAT HAS INTOLERANCE TO SOME FOODS AND BY ELIMINATING THEM DIARRHEA IS 
IMPROVING . WEIGHT LOSS, FATIGUE.[facility]  
Work Phone: [phone] of Present illness Narrative* Past Medical, 
  Surgical and Family History: reviewed and updated in chart.  
* Medications and Supplements: Review of all medications by a prescribing 
  practitioner or clinical pharmacist (such as prescriptions, OTC's, herbal 
  therapies and supplements) documented in the medical record.  
* No, the patient is not using opioids.  
* Tobacco use: Non-User  
* Alcohol use: User  
* Illicit drug use: Non-User  
* Current diet: heart healthy diet.  
* Exercise Frequency: regular  
* Depression Screening:  
* Patient Health Questionnaire - 2 (PHQ-2):.  
* During the past 2 weeks, the patient has not felt down, depressed or hopeless.  
* During the past 2 weeks, the patient has not felt little interest or pleasure 
  in doing things.  
* Hearing Impairment: none.  
* Visual Acuity: IO Vision Screening in results section  
* Visual Acuity Corrected: right eye 20/25, left eye 20/25.  
* Visual Acuity Uncorrected: right eye 20/40, left eye 20/50.  
* Cognitive Impairment: No cognitive impairment observed.  
* Activities of Daily Living: She does not have issues with activities of daily 
  living (e.g. dressing, bathing, walking, shopping, housekeeping, etc.).  
* Falls Risk Screening: SHANICE has fallen in the last 6 months. Her fall did 
  not result in injury.  
* Home safety risk factors: None.  
[facility]  
Work Phone: [phone] of Present illness Narrative* Past Medical, 
  Surgical and Family History: reviewed and updated in chart.  
* Medications and Supplements: Review of all medications by a prescribing 
  practitioner or clinical pharmacist (such as prescriptions, OTC's, herbal 
  therapies and supplements) documented in the medical record.  
* No, the patient is not using opioids.  
* Tobacco use: Non-User  
* Alcohol use: User  
* Illicit drug use: Non-User  
* Current diet: heart healthy diet.  
* Exercise Frequency: regular  
* Depression Screening:  
* Patient Health Questionnaire - 2 (PHQ-2):.  
* During the past 2 weeks, the patient has not felt down, depressed or hopeless.  
* During the past 2 weeks, the patient has not felt little interest or pleasure 
  in doing things.  
* Hearing Impairment: none.  
* Visual Acuity:  Vision Screening in results section  
* Visual Acuity Corrected: both eyes 20/20/25, right eye 20/25, left eye 20/25.  
* Visual Acuity Uncorrected: both eyes 20/20/40, right eye 20/40, left eye 
  20/50.  
* Cognitive Impairment: No cognitive impairment observed.  
* Activities of Daily Living: She does not have issues with activities of daily 
  living (e.g. dressing, bathing, walking, shopping, housekeeping, etc.).  
* Falls Risk Screening: SHANICE has fallen in the last 6 months. Her fall did 
  not result in injury.  
* Fall risk factors: no polypharmacy, no sedative use, no urinary incontinence, 
  no visual impairment,no mobility impairment, no alcohol use, no 
  deconditioning, no cognitive impairment, no antihypertensive use, no postural 
  hypotension, up and go test was normal and no antidepressant use.  
* Care Plan Low/Moderate Risk: Regular physical activity such as walking, water 
  aerobics or christine chi to improve strength, balance, coordination and 
  flexibility. Wear appropriate, sensible shoe wear. Remove fall hazards at home
   such as loose rugs, obstacles, use non-slip surface in bath or shower. Keep
  living space well lit.  
* Home safety risk factors: None.  
* Patient's End of Life Decisions: I agree to follow the patient's decisions.  
* LAB F/U . COMPLAINS OF CHRONIC RLQ ABDOMINAL PAIN 5/10 ON AND OFF , S/P 
  APPENDECTOMY YEARS AGO.MEDICARE WELLNESS (FIRST VISIT). WORSENING CHRONIC 
  NUMBNESS AND TINGLING OF FEET WITH PAIN 3-4/10.S/P FALL 4 MONTHS AGO BECAUSE 
  OF HAVING NUMBNESS OF FEET.  
[facility]  
Work Phone: [phone] of Present illness NarrativeF/U ON HTN AND U/S
 OF ABDOMEN AND PELVIS. HAS CHRONIC FATIGUE. CAN TOLERATE THE LOW DOSAGE OF OTC 
IRON HAS CHRONIC R FLANK PAIN WITH ABDOMINAL BLOATING .PT IS TRYING TO ELIMINATE
 THE DAIRIES ,WHICH HELPS SOME. HAS NO URINE COMPLAINTS.[facility]  
Work Phone: [phone]  
  
Summary Purpose  
  
  
                                                      
  
  
  
Family History  
No Family History Records Found       aunt  
  
                                Name            Dates           Details  
   
                                                    Family history of malignant   
neoplasm of breast(V16.3, Z80.3)  
                                                    Status:Active  
  
                                great grandmother  
  
                                Name            Dates           Details  
   
                                                    Family history of malignant   
neoplasm of stomach(V16.0, Z80.0)  
                                                    Status:Active  
  
                                     Mother  
  
                                Name            Dates           Details  
   
                                                    Family history of cardiac di  
sorder(V17.49, Z82.49)  
                                                    Status:Active  
  
                                     Father  
  
                                Name            Dates           Details  
   
                                                    Family history of malignant   
neoplasm of prostate(V16.42, Z80.42)  
                                                    Status:Active  
   
                                                    Family history of hypertensi  
on(V17.49, Z82.49)  
                                                    Status:Active  
   
                                                    Family history of diabetes m  
ellitus(V18.0, Z83.3)  
                                                    Status:Active  
   
                                                    Family history of lung cance  
r(V16.1, Z80.1)  
                                                    Status:Active  
  
                                     Sister  
  
                                Name            Dates           Details  
   
                                                    Family history of hypothyroi  
dism(V18.19, Z83.49)  
                                                    Status:Active  
  
                                      aunt  
  
                                Name            Dates           Details  
   
                                                    Family history of malignant   
neoplasm of breast(V16.3, Z80.3)  
                                                    Status:Active  
  
                                great grandmother  
  
                                Name            Dates           Details  
   
                                                    Family history of malignant   
neoplasm of stomach(V16.0, Z80.0)  
                                                    Status:Active  
  
                                     Mother  
  
                                Name            Dates           Details  
   
                                                    Family history of cardiac di  
sorder(V17.49, Z82.49)  
                                                    Status:Active  
  
                                     Father  
  
                                Name            Dates           Details  
   
                                                    Family history of malignant   
neoplasm of prostate(V16.42, Z80.42)  
                                                    Status:Active  
   
                                                    Family history of hypertensi  
on(V17.49, Z82.49)  
                                                    Status:Active  
   
                                                    Family history of diabetes m  
ellitus(V18.0, Z83.3)  
                                                    Status:Active  
   
                                                    Family history of lung cance  
r(V16.1, Z80.1)  
                                                    Status:Active  
  
                                     Sister  
  
                                Name            Dates           Details  
   
                                                    Family history of hypothyroi  
dism(V18.19, Z83.49)  
                                                    Status:Active  
  
                                      aunt  
  
                                Name            Dates           Details  
   
                                                    Family history of malignant   
neoplasm of breast(V16.3, Z80.3)  
                                                    Status:Active  
  
                                great grandmother  
  
                                Name            Dates           Details  
   
                                                    Family history of malignant   
neoplasm of stomach(V16.0, Z80.0)  
                                                    Status:Active  
  
                                     Mother  
  
                                Name            Dates           Details  
   
                                                    Family history of cardiac di  
sorder(V17.49, Z82.49)  
                                                    Status:Active  
  
                                     Father  
  
                                Name            Dates           Details  
   
                                                    Family history of malignant   
neoplasm of prostate(V16.42, Z80.42)  
                                                    Status:Active  
   
                                                    Family history of hypertensi  
on(V17.49, Z82.49)  
                                                    Status:Active  
   
                                                    Family history of diabetes m  
ellitus(V18.0, Z83.3)  
                                                    Status:Active  
   
                                                    Family history of lung cance  
r(V16.1, Z80.1)  
                                                    Status:Active  
  
                                     Sister  
  
                                Name            Dates           Details  
   
                                                    Family history of hypothyroi  
dism(V18.19, Z83.49)  
                                                    Status:Active  
  
                                      aunt  
  
                                Name            Dates           Details  
   
                                                    Family history of malignant   
neoplasm of breast(V16.3, Z80.3)  
                                                    Status:Active  
  
                                great grandmother  
  
                                Name            Dates           Details  
   
                                                    Family history of malignant   
neoplasm of stomach(V16.0, Z80.0)  
                                                    Status:Active  
  
                                     Mother  
  
                                Name            Dates           Details  
   
                                                    Family history of cardiac di  
sorder(V17.49, Z82.49)  
                                                    Status:Active  
  
                                     Father  
  
                                Name            Dates           Details  
   
                                                    Family history of malignant   
neoplasm of prostate(V16.42, Z80.42)  
                                                    Status:Active  
   
                                                    Family history of hypertensi  
on(V17.49, Z82.49)  
                                                    Status:Active  
   
                                                    Family history of diabetes m  
ellitus(V18.0, Z83.3)  
                                                    Status:Active  
   
                                                    Family history of lung cance  
r(V16.1, Z80.1)  
                                                    Status:Active  
  
                                     Sister  
  
                                Name            Dates           Details  
   
                                                    Family history of hypothyroi  
dism(V18.19, Z83.49)  
                                                    Status:Active  
  
                                      aunt  
  
                                Name            Dates           Details  
   
                                                    Family history of malignant   
neoplasm of breast(V16.3, Z80.3)  
                                                    Status:Active  
  
                                great grandmother  
  
                                Name            Dates           Details  
   
                                                    Family history of malignant   
neoplasm of stomach(V16.0, Z80.0)  
                                                    Status:Active  
  
                                     Mother  
  
                                Name            Dates           Details  
   
                                                    Family history of cardiac di  
sorder(V17.49, Z82.49)  
                                                    Status:Active  
  
                                     Father  
  
                                Name            Dates           Details  
   
                                                    Family history of malignant   
neoplasm of prostate(V16.42, Z80.42)  
                                                    Status:Active  
   
                                                    Family history of hypertensi  
on(V17.49, Z82.49)  
                                                    Status:Active  
   
                                                    Family history of diabetes m  
ellitus(V18.0, Z83.3)  
                                                    Status:Active  
   
                                                    Family history of lung cance  
r(V16.1, Z80.1)  
                                                    Status:Active  
  
                                     Sister  
  
                                Name            Dates           Details  
   
                                                    Family history of hypothyroi  
dism(V18.19, Z83.49)  
                                                    Status:Active  
  
                                      aunt  
  
                                Name            Dates           Details  
   
                                                    Family history of malignant   
neoplasm of breast(V16.3, Z80.3)  
                                                    Status:Active  
  
                                great grandmother  
  
                                Name            Dates           Details  
   
                                                    Family history of malignant   
neoplasm of stomach(V16.0, Z80.0)  
                                                    Status:Active  
  
                                     Mother  
  
                                Name            Dates           Details  
   
                                                    Family history of cardiac di  
sorder(V17.49, Z82.49)  
                                                    Status:Active  
  
                                     Father  
  
                                Name            Dates           Details  
   
                                                    Family history of lung cance  
r(V16.1, Z80.1)  
                                                    Status:Active  
   
                                                    Family history of diabetes m  
ellitus(V18.0, Z83.3)  
                                                    Status:Active  
   
                                                    Family history of malignant   
neoplasm of prostate(V16.42, Z80.42)  
                                                    Status:Active  
   
                                                    Family history of hypertensi  
on(V17.49, Z82.49)  
                                                    Status:Active  
  
                                     Sister  
  
                                Name            Dates           Details  
   
                                                    Family history of hypothyroi  
dism(V18.19, Z83.49)  
                                                    Status:Active  
  
                                      aunt  
  
                                Name            Dates           Details  
   
                                                    Family history of malignant   
neoplasm of breast(V16.3, Z80.3)  
                                                    Status:Active  
  
                                great grandmother  
  
                                Name            Dates           Details  
   
                                                    Family history of malignant   
neoplasm of stomach(V16.0, Z80.0)  
                                                    Status:Active  
  
                                     Mother  
  
                                Name            Dates           Details  
   
                                                    Family history of cardiac di  
sorder(V17.49, Z82.49)  
                                                    Status:Active  
  
                                     Father  
  
                                Name            Dates           Details  
   
                                                    Family history of malignant   
neoplasm of prostate(V16.42, Z80.42)  
                                                    Status:Active  
   
                                                    Family history of hypertensi  
on(V17.49, Z82.49)  
                                                    Status:Active  
   
                                                    Family history of diabetes m  
ellitus(V18.0, Z83.3)  
                                                    Status:Active  
   
                                                    Family history of lung cance  
r(V16.1, Z80.1)  
                                                    Status:Active  
  
                                     Sister  
  
                                Name            Dates           Details  
   
                                                    Family history of hypothyroi  
dism(V18.19, Z83.49)  
                                                    Status:Active  
  
                              Unknown Family Member  
  
                                Name            Dates           Details  
   
                                                    Family history of cardiac di  
sorder: Mother(V17.49, Z82.49)  
                                                    Status:Active  
   
                                                    Family history of malignant   
neoplasm of prostate: Father(V16.42,   
Z80.42)  
                                                    Status:Active  
   
                                                    Family history of hypertensi  
on: Father(V17.49, Z82.49)  
                                                    Status:Active  
   
                                                    Family history of hypothyroi  
dism: Sister(V18.19, Z83.49)  
                                                    Status:Active  
   
                                                    Family history of malignant   
neoplasm of stomach: Maternal Great   
Grandmother(V16.0, Z80.0)  
                                                    Status:Active  
   
                                                    Family history of malignant   
neoplasm of breast: Maternal   
Aunt(V16.3, Z80.3)  
                                                    Status:Active  
   
                                                    Family history of diabetes m  
ellitus: Father(V18.0, Z83.3)  
                                                    Status:Active  
   
                                                    Family history of lung cance  
r: Father(V16.1, Z80.1)  
                                                    Status:Active  
  
                              Unknown Family Member  
  
                                Name            Dates           Details  
   
                                                    Family history of cardiac di  
sorder: Mother(V17.49, Z82.49)  
                                                    Status:Active  
   
                                                    Family history of malignant   
neoplasm of prostate: Father(V16.42,   
Z80.42)  
                                                    Status:Active  
   
                                                    Family history of hypertensi  
on: Father(V17.49, Z82.49)  
                                                    Status:Active  
   
                                                    Family history of hypothyroi  
dism: Sister(V18.19, Z83.49)  
                                                    Status:Active  
   
                                                    Family history of malignant   
neoplasm of stomach: Maternal Great   
Grandmother(V16.0, Z80.0)  
                                                    Status:Active  
   
                                                    Family history of malignant   
neoplasm of breast: Maternal   
Aunt(V16.3, Z80.3)  
                                                    Status:Active  
   
                                                    Family history of diabetes m  
ellitus: Father(V18.0, Z83.3)  
                                                    Status:Active  
   
                                                    Family history of lung cance  
r: Father(V16.1, Z80.1)  
                                                    Status:Active  
  
                              Unknown Family Member  
  
                                Name            Dates           Details  
   
                                                    Family history of cardiac di  
sorder: Mother(V17.49, Z82.49)  
                                                    Status:Active  
   
                                                    Family history of malignant   
neoplasm of prostate: Father(V16.42,   
Z80.42)  
                                                    Status:Active  
   
                                                    Family history of hypertensi  
on: Father(V17.49, Z82.49)  
                                                    Status:Active  
   
                                                    Family history of hypothyroi  
dism: Sister(V18.19, Z83.49)  
                                                    Status:Active  
   
                                                    Family history of malignant   
neoplasm of stomach: Maternal Great   
Grandmother(V16.0, Z80.0)  
                                                    Status:Active  
   
                                                    Family history of malignant   
neoplasm of breast: Maternal   
Aunt(V16.3, Z80.3)  
                                                    Status:Active  
   
                                                    Family history of diabetes m  
ellitus: Father(V18.0, Z83.3)  
                                                    Status:Active  
   
                                                    Family history of lung cance  
r: Father(V16.1, Z80.1)  
                                                    Status:Active  
  
                              Unknown Family Member  
  
                                Name            Dates           Details  
   
                                                    Family history of cardiac di  
sorder: Mother(V17.49, Z82.49)  
                                                    Status:Active  
   
                                                    Family history of malignant   
neoplasm of prostate: Father(V16.42,   
Z80.42)  
                                                    Status:Active  
   
                                                    Family history of hypertensi  
on: Father(V17.49, Z82.49)  
                                                    Status:Active  
   
                                                    Family history of hypothyroi  
dism: Sister(V18.19, Z83.49)  
                                                    Status:Active  
   
                                                    Family history of malignant   
neoplasm of stomach: Maternal Great   
Grandmother(V16.0, Z80.0)  
                                                    Status:Active  
   
                                                    Family history of malignant   
neoplasm of breast: Maternal   
Aunt(V16.3, Z80.3)  
                                                    Status:Active  
   
                                                    Family history of diabetes m  
ellitus: Father(V18.0, Z83.3)  
                                                    Status:Active  
   
                                                    Family history of lung cance  
r: Father(V16.1, Z80.1)  
                                                    Status:Active  
  
                              Unknown Family Member  
  
                                Name            Dates           Details  
   
                                                    Family history of cardiac di  
sorder: Mother(V17.49, Z82.49)  
                                                    Status:Active  
   
                                                    Family history of malignant   
neoplasm of prostate: Father(V16.42,   
Z80.42)  
                                                    Status:Active  
   
                                                    Family history of hypertensi  
on: Father(V17.49, Z82.49)  
                                                    Status:Active  
   
                                                    Family history of hypothyroi  
dism: Sister(V18.19, Z83.49)  
                                                    Status:Active  
   
                                                    Family history of malignant   
neoplasm of stomach: Maternal Great   
Grandmother(V16.0, Z80.0)  
                                                    Status:Active  
   
                                                    Family history of malignant   
neoplasm of breast: Maternal   
Aunt(V16.3, Z80.3)  
                                                    Status:Active  
   
                                                    Family history of diabetes m  
ellitus: Father(V18.0, Z83.3)  
                                                    Status:Active  
   
                                                    Family history of lung cance  
r: Father(V16.1, Z80.1)  
                                                    Status:Active  
  
                              Unknown Family Member  
  
                                Name            Dates           Details  
   
                                                    Family history of cardiac di  
sorder: Mother(V17.49, Z82.49)  
                                                    Status:Active  
   
                                                    Family history of malignant   
neoplasm of prostate: Father(V16.42,   
Z80.42)  
                                                    Status:Active  
   
                                                    Family history of hypertensi  
on: Father(V17.49, Z82.49)  
                                                    Status:Active  
   
                                                    Family history of hypothyroi  
dism: Sister(V18.19, Z83.49)  
                                                    Status:Active  
   
                                                    Family history of malignant   
neoplasm of stomach: Maternal Great   
Grandmother(V16.0, Z80.0)  
                                                    Status:Active  
   
                                                    Family history of malignant   
neoplasm of breast: Maternal   
Aunt(V16.3, Z80.3)  
                                                    Status:Active  
   
                                                    Family history of diabetes m  
ellitus: Father(V18.0, Z83.3)  
                                                    Status:Active  
   
                                                    Family history of lung cance  
r: Father(V16.1, Z80.1)  
                                                    Status:Active  
  
                              Unknown Family Member  
  
                                Name            Dates           Details  
   
                                                    Family history of cardiac di  
sorder: Mother(V17.49, Z82.49)  
                                                    Status:Active  
   
                                                    Family history of malignant   
neoplasm of prostate: Father(V16.42,   
Z80.42)  
                                                    Status:Active  
   
                                                    Family history of hypertensi  
on: Father(V17.49, Z82.49)  
                                                    Status:Active  
   
                                                    Family history of hypothyroi  
dism: Sister(V18.19, Z83.49)  
                                                    Status:Active  
   
                                                    Family history of malignant   
neoplasm of stomach: Maternal Great   
Grandmother(V16.0, Z80.0)  
                                                    Status:Active  
   
                                                    Family history of malignant   
neoplasm of breast: Maternal   
Aunt(V16.3, Z80.3)  
                                                    Status:Active  
   
                                                    Family history of diabetes m  
ellitus: Father(V18.0, Z83.3)  
                                                    Status:Active  
   
                                                    Family history of lung cance  
r: Father(V16.1, Z80.1)  
                                                    Status:Active  
  
                              Unknown Family Member  
  
                                Name            Dates           Details  
   
                                                    Family history of cardiac di  
sorder: Mother(V17.49, Z82.49)  
                                                    Status:Active  
   
                                                    Family history of malignant   
neoplasm of prostate: Father(V16.42,   
Z80.42)  
                                                    Status:Active  
   
                                                    Family history of hypertensi  
on: Father(V17.49, Z82.49)  
                                                    Status:Active  
   
                                                    Family history of hypothyroi  
dism: Sister(V18.19, Z83.49)  
                                                    Status:Active  
   
                                                    Family history of malignant   
neoplasm of stomach: Maternal Great   
Grandmother(V16.0, Z80.0)  
                                                    Status:Active  
   
                                                    Family history of malignant   
neoplasm of breast: Maternal   
Aunt(V16.3, Z80.3)  
                                                    Status:Active  
   
                                                    Family history of diabetes m  
ellitus: Father(V18.0, Z83.3)  
                                                    Status:Active  
   
                                                    Family history of lung cance  
r: Father(V16.1, Z80.1)  
                                                    Status:Active  
  
                              Unknown Family Member  
  
                                Name            Dates           Details  
   
                                                    Family history of cardiac di  
sorder: Mother(V17.49, Z82.49)  
                                                    Status:Active  
   
                                                    Family history of malignant   
neoplasm of prostate: Father(V16.42,   
Z80.42)  
                                                    Status:Active  
   
                                                    Family history of hypertensi  
on: Father(V17.49, Z82.49)  
                                                    Status:Active  
   
                                                    Family history of hypothyroi  
dism: Sister(V18.19, Z83.49)  
                                                    Status:Active  
   
                                                    Family history of malignant   
neoplasm of stomach: Maternal Great   
Grandmother(V16.0, Z80.0)  
                                                    Status:Active  
   
                                                    Family history of malignant   
neoplasm of breast: Maternal   
Aunt(V16.3, Z80.3)  
                                                    Status:Active  
   
                                                    Family history of diabetes m  
ellitus: Father(V18.0, Z83.3)  
                                                    Status:Active  
   
                                                    Family history of lung cance  
r: Father(V16.1, Z80.1)  
                                                    Status:Active  
  
                              Unknown Family Member  
  
                                Name            Dates           Details  
   
                                                    Family history of cardiac di  
sorder: Mother(V17.49, Z82.49)  
                                                    Status:Active  
   
                                                    Family history of malignant   
neoplasm of prostate: Father(V16.42,   
Z80.42)  
                                                    Status:Active  
   
                                                    Family history of hypertensi  
on: Father(V17.49, Z82.49)  
                                                    Status:Active  
   
                                                    Family history of hypothyroi  
dism: Sister(V18.19, Z83.49)  
                                                    Status:Active  
   
                                                    Family history of malignant   
neoplasm of stomach: Maternal Great   
Grandmother(V16.0, Z80.0)  
                                                    Status:Active  
   
                                                    Family history of malignant   
neoplasm of breast: Maternal   
Aunt(V16.3, Z80.3)  
                                                    Status:Active  
   
                                                    Family history of diabetes m  
ellitus: Father(V18.0, Z83.3)  
                                                    Status:Active  
   
                                                    Family history of lung cance  
r: Father(V16.1, Z80.1)  
                                                    Status:Active  
  
                              Unknown Family Member  
  
                                Name            Dates           Details  
   
                                                    Family history of cardiac di  
sorder: Mother(V17.49, Z82.49)  
                                                    Status:Active  
   
                                                    Family history of malignant   
neoplasm of prostate: Father(V16.42,   
Z80.42)  
                                                    Status:Active  
   
                                                    Family history of hypertensi  
on: Father(V17.49, Z82.49)  
                                                    Status:Active  
   
                                                    Family history of hypothyroi  
dism: Sister(V18.19, Z83.49)  
                                                    Status:Active  
   
                                                    Family history of malignant   
neoplasm of stomach: Maternal Great   
Grandmother(V16.0, Z80.0)  
                                                    Status:Active  
   
                                                    Family history of malignant   
neoplasm of breast: Maternal   
Aunt(V16.3, Z80.3)  
                                                    Status:Active  
   
                                                    Family history of diabetes m  
ellitus: Father(V18.0, Z83.3)  
                                                    Status:Active  
   
                                                    Family history of lung cance  
r: Father(V16.1, Z80.1)  
                                                    Status:Active  
  
                              Unknown Family Member  
  
                                Name            Dates           Details  
   
                                                    Family history of cardiac di  
sorder: Mother(V17.49, Z82.49)  
                                                    Status:Active  
   
                                                    Family history of malignant   
neoplasm of prostate: Father(V16.42,   
Z80.42)  
                                                    Status:Active  
   
                                                    Family history of hypertensi  
on: Father(V17.49, Z82.49)  
                                                    Status:Active  
   
                                                    Family history of hypothyroi  
dism: Sister(V18.19, Z83.49)  
                                                    Status:Active  
   
                                                    Family history of malignant   
neoplasm of stomach: Maternal Great   
Grandmother(V16.0, Z80.0)  
                                                    Status:Active  
   
                                                    Family history of malignant   
neoplasm of breast: Maternal   
Aunt(V16.3, Z80.3)  
                                                    Status:Active  
   
                                                    Family history of diabetes m  
ellitus: Father(V18.0, Z83.3)  
                                                    Status:Active  
   
                                                    Family history of lung cance  
r: Father(V16.1, Z80.1)  
                                                    Status:Active  
  
                              Unknown Family Member  
  
                                Name            Dates           Details  
   
                                                    Family history of cardiac di  
sorder: Mother(V17.49, Z82.49)  
                                                    Status:Active  
   
                                                    Family history of malignant   
neoplasm of prostate: Father(V16.42,   
Z80.42)  
                                                    Status:Active  
   
                                                    Family history of hypertensi  
on: Father(V17.49, Z82.49)  
                                                    Status:Active  
   
                                                    Family history of hypothyroi  
dism: Sister(V18.19, Z83.49)  
                                                    Status:Active  
   
                                                    Family history of malignant   
neoplasm of stomach: Maternal Great   
Grandmother(V16.0, Z80.0)  
                                                    Status:Active  
   
                                                    Family history of malignant   
neoplasm of breast: Maternal   
Aunt(V16.3, Z80.3)  
                                                    Status:Active  
   
                                                    Family history of diabetes m  
ellitus: Father(V18.0, Z83.3)  
                                                    Status:Active  
   
                                                    Family history of lung cance  
r: Father(V16.1, Z80.1)  
                                                    Status:Active  
  
                              Unknown Family Member  
  
                                Name            Dates           Details  
   
                                                    Family history of cardiac di  
sorder: Mother(V17.49, Z82.49)  
                                                    Status:Active  
   
                                                    Family history of malignant   
neoplasm of prostate: Father(V16.42,   
Z80.42)  
                                                    Status:Active  
   
                                                    Family history of hypertensi  
on: Father(V17.49, Z82.49)  
                                                    Status:Active  
   
                                                    Family history of hypothyroi  
dism: Sister(V18.19, Z83.49)  
                                                    Status:Active  
   
                                                    Family history of malignant   
neoplasm of stomach: Maternal Great   
Grandmother(V16.0, Z80.0)  
                                                    Status:Active  
   
                                                    Family history of malignant   
neoplasm of breast: Maternal   
Aunt(V16.3, Z80.3)  
                                                    Status:Active  
   
                                                    Family history of diabetes m  
ellitus: Father(V18.0, Z83.3)  
                                                    Status:Active  
   
                                                    Family history of lung cance  
r: Father(V16.1, Z80.1)  
                                                    Status:Active  
  
                              Unknown Family Member  
  
                                Name            Dates           Details  
   
                                                    Family history of cardiac di  
sorder: Mother(V17.49, Z82.49)  
                                                    Status:Active  
   
                                                    Family history of malignant   
neoplasm of prostate: Father(V16.42,   
Z80.42)  
                                                    Status:Active  
   
                                                    Family history of hypertensi  
on: Father(V17.49, Z82.49)  
                                                    Status:Active  
   
                                                    Family history of hypothyroi  
dism: Sister(V18.19, Z83.49)  
                                                    Status:Active  
   
                                                    Family history of malignant   
neoplasm of stomach: Maternal Great   
Grandmother(V16.0, Z80.0)  
                                                    Status:Active  
   
                                                    Family history of malignant   
neoplasm of breast: Maternal   
Aunt(V16.3, Z80.3)  
                                                    Status:Active  
   
                                                    Family history of diabetes m  
ellitus: Father(V18.0, Z83.3)  
                                                    Status:Active  
   
                                                    Family history of lung cance  
r: Father(V16.1, Z80.1)  
                                                    Status:Active  
  
                              Unknown Family Member  
  
                                Name            Dates           Details  
   
                                                    Family history of cardiac di  
sorder: Mother(V17.49, Z82.49)  
                                                    Status:Active  
   
                                                    Family history of malignant   
neoplasm of prostate: Father(V16.42,   
Z80.42)  
                                                    Status:Active  
   
                                                    Family history of hypertensi  
on: Father(V17.49, Z82.49)  
                                                    Status:Active  
   
                                                    Family history of hypothyroi  
dism: Sister(V18.19, Z83.49)  
                                                    Status:Active  
   
                                                    Family history of malignant   
neoplasm of stomach: Maternal Great   
Grandmother(V16.0, Z80.0)  
                                                    Status:Active  
   
                                                    Family history of malignant   
neoplasm of breast: Maternal   
Aunt(V16.3, Z80.3)  
                                                    Status:Active  
   
                                                    Family history of diabetes m  
ellitus: Father(V18.0, Z83.3)  
                                                    Status:Active  
   
                                                    Family history of lung cance  
r: Father(V16.1, Z80.1)  
                                                    Status:Active  
  
                              Unknown Family Member  
  
                                Name            Dates           Details  
   
                                                    Family history of cardiac di  
sorder: Mother(V17.49, Z82.49)  
                                                    Status:Active  
   
                                                    Family history of malignant   
neoplasm of prostate: Father(V16.42,   
Z80.42)  
                                                    Status:Active  
   
                                                    Family history of hypertensi  
on: Father(V17.49, Z82.49)  
                                                    Status:Active  
   
                                                    Family history of hypothyroi  
dism: Sister(V18.19, Z83.49)  
                                                    Status:Active  
   
                                                    Family history of malignant   
neoplasm of stomach: Maternal Great   
Grandmother(V16.0, Z80.0)  
                                                    Status:Active  
   
                                                    Family history of malignant   
neoplasm of breast: Maternal   
Aunt(V16.3, Z80.3)  
                                                    Status:Active  
   
                                                    Family history of diabetes m  
ellitus: Father(V18.0, Z83.3)  
                                                    Status:Active  
   
                                                    Family history of lung cance  
r: Father(V16.1, Z80.1)  
                                                    Status:Active  
  
                              Unknown Family Member  
  
                                Name            Dates           Details  
   
                                                    Family history of cardiac di  
sorder: Mother(V17.49, Z82.49)  
                                                    Status:Active  
   
                                                    Family history of malignant   
neoplasm of prostate: Father(V16.42,   
Z80.42)  
                                                    Status:Active  
   
                                                    Family history of hypertensi  
on: Father(V17.49, Z82.49)  
                                                    Status:Active  
   
                                                    Family history of hypothyroi  
dism: Sister(V18.19, Z83.49)  
                                                    Status:Active  
   
                                                    Family history of malignant   
neoplasm of stomach: Maternal Great   
Grandmother(V16.0, Z80.0)  
                                                    Status:Active  
   
                                                    Family history of malignant   
neoplasm of breast: Maternal   
Aunt(V16.3, Z80.3)  
                                                    Status:Active  
   
                                                    Family history of diabetes m  
ellitus: Father(V18.0, Z83.3)  
                                                    Status:Active  
   
                                                    Family history of lung cance  
r: Father(V16.1, Z80.1)  
                                                    Status:Active  
  
                              Unknown Family Member  
  
                                Name            Dates           Details  
   
                                                    Family history of cardiac di  
sorder: Mother(V17.49, Z82.49)  
                                                    Status:Active  
   
                                                    Family history of malignant   
neoplasm of prostate: Father(V16.42,   
Z80.42)  
                                                    Status:Active  
   
                                                    Family history of hypertensi  
on: Father(V17.49, Z82.49)  
                                                    Status:Active  
   
                                                    Family history of hypothyroi  
dism: Sister(V18.19, Z83.49)  
                                                    Status:Active  
   
                                                    Family history of malignant   
neoplasm of stomach: Maternal Great   
Grandmother(V16.0, Z80.0)  
                                                    Status:Active  
   
                                                    Family history of malignant   
neoplasm of breast: Maternal   
Aunt(V16.3, Z80.3)  
                                                    Status:Active  
   
                                                    Family history of diabetes m  
ellitus: Father(V18.0, Z83.3)  
                                                    Status:Active  
   
                                                    Family history of lung cance  
r: Father(V16.1, Z80.1)  
                                                    Status:Active  
  
                              Unknown Family Member  
  
                                Name            Dates           Details  
   
                                                    Family history of cardiac di  
sorder: Mother(V17.49, Z82.49)  
                                                    Status:Active  
   
                                                    Family history of malignant   
neoplasm of prostate: Father(V16.42,   
Z80.42)  
                                                    Status:Active  
   
                                                    Family history of hypertensi  
on: Father(V17.49, Z82.49)  
                                                    Status:Active  
   
                                                    Family history of hypothyroi  
dism: Sister(V18.19, Z83.49)  
                                                    Status:Active  
   
                                                    Family history of malignant   
neoplasm of stomach: Maternal Great   
Grandmother(V16.0, Z80.0)  
                                                    Status:Active  
   
                                                    Family history of malignant   
neoplasm of breast: Maternal   
Aunt(V16.3, Z80.3)  
                                                    Status:Active  
   
                                                    Family history of diabetes m  
ellitus: Father(V18.0, Z83.3)  
                                                    Status:Active  
   
                                                    Family history of lung cance  
r: Father(V16.1, Z80.1)  
                                                    Status:Active  
  
                              Unknown Family Member  
  
                                Name            Dates           Details  
   
                                                    Family history of cardiac di  
sorder: Mother(V17.49, Z82.49)  
                                                    Status:Active  
   
                                                    Family history of malignant   
neoplasm of prostate: Father(V16.42,   
Z80.42)  
                                                    Status:Active  
   
                                                    Family history of hypertensi  
on: Father(V17.49, Z82.49)  
                                                    Status:Active  
   
                                                    Family history of hypothyroi  
dism: Sister(V18.19, Z83.49)  
                                                    Status:Active  
   
                                                    Family history of malignant   
neoplasm of stomach: Maternal Great   
Grandmother(V16.0, Z80.0)  
                                                    Status:Active  
   
                                                    Family history of malignant   
neoplasm of breast: Maternal   
Aunt(V16.3, Z80.3)  
                                                    Status:Active  
   
                                                    Family history of diabetes m  
ellitus: Father(V18.0, Z83.3)  
                                                    Status:Active  
   
                                                    Family history of lung cance  
r: Father(V16.1, Z80.1)  
                                                    Status:Active  
  
                              Unknown Family Member  
  
                                Name            Dates           Details  
   
                                                    Family history of cardiac di  
sorder: Mother(V17.49, Z82.49)  
                                                    Status:Active  
   
                                                    Family history of malignant   
neoplasm of prostate: Father(V16.42,   
Z80.42)  
                                                    Status:Active  
   
                                                    Family history of hypertensi  
on: Father(V17.49, Z82.49)  
                                                    Status:Active  
   
                                                    Family history of hypothyroi  
dism: Sister(V18.19, Z83.49)  
                                                    Status:Active  
   
                                                    Family history of malignant   
neoplasm of stomach: Maternal Great   
Grandmother(V16.0, Z80.0)  
                                                    Status:Active  
   
                                                    Family history of malignant   
neoplasm of breast: Maternal   
Aunt(V16.3, Z80.3)  
                                                    Status:Active  
   
                                                    Family history of diabetes m  
ellitus: Father(V18.0, Z83.3)  
                                                    Status:Active  
   
                                                    Family history of lung cance  
r: Father(V16.1, Z80.1)  
                                                    Status:Active  
  
                              Unknown Family Member  
  
                                Name            Dates           Details  
   
                                                    Family history of cardiac di  
sorder: Mother(V17.49, Z82.49)  
                                                    Status:Active  
   
                                                    Family history of malignant   
neoplasm of prostate: Father(V16.42,   
Z80.42)  
                                                    Status:Active  
   
                                                    Family history of hypertensi  
on: Father(V17.49, Z82.49)  
                                                    Status:Active  
   
                                                    Family history of hypothyroi  
dism: Sister(V18.19, Z83.49)  
                                                    Status:Active  
   
                                                    Family history of malignant   
neoplasm of stomach: Maternal Great   
Grandmother(V16.0, Z80.0)  
                                                    Status:Active  
   
                                                    Family history of malignant   
neoplasm of breast: Maternal   
Aunt(V16.3, Z80.3)  
                                                    Status:Active  
   
                                                    Family history of diabetes m  
ellitus: Father(V18.0, Z83.3)  
                                                    Status:Active  
   
                                                    Family history of lung cance  
r: Father(V16.1, Z80.1)  
                                                    Status:Active  
  
                              Unknown Family Member  
  
                                Name            Dates           Details  
   
                                                    Family history of cardiac di  
sorder: Mother(V17.49, Z82.49)  
                                                    Status:Active  
   
                                                    Family history of malignant   
neoplasm of prostate: Father(V16.42,   
Z80.42)  
                                                    Status:Active  
   
                                                    Family history of hypertensi  
on: Father(V17.49, Z82.49)  
                                                    Status:Active  
   
                                                    Family history of hypothyroi  
dism: Sister(V18.19, Z83.49)  
                                                    Status:Active  
   
                                                    Family history of malignant   
neoplasm of stomach: Maternal Great   
Grandmother(V16.0, Z80.0)  
                                                    Status:Active  
   
                                                    Family history of malignant   
neoplasm of breast: Maternal   
Aunt(V16.3, Z80.3)  
                                                    Status:Active  
   
                                                    Family history of diabetes m  
ellitus: Father(V18.0, Z83.3)  
                                                    Status:Active  
   
                                                    Family history of lung cance  
r: Father(V16.1, Z80.1)  
                                                    Status:Active  
  
                              Unknown Family Member  
  
                                Name            Dates           Details  
   
                                                    Family history of cardiac di  
sorder: Mother(V17.49, Z82.49)  
                                                    Status:Active  
   
                                                    Family history of malignant   
neoplasm of prostate: Father(V16.42,   
Z80.42)  
                                                    Status:Active  
   
                                                    Family history of hypertensi  
on: Father(V17.49, Z82.49)  
                                                    Status:Active  
   
                                                    Family history of hypothyroi  
dism: Sister(V18.19, Z83.49)  
                                                    Status:Active  
   
                                                    Family history of malignant   
neoplasm of stomach: Maternal Great   
Grandmother(V16.0, Z80.0)  
                                                    Status:Active  
   
                                                    Family history of malignant   
neoplasm of breast: Maternal   
Aunt(V16.3, Z80.3)  
                                                    Status:Active  
   
                                                    Family history of diabetes m  
ellitus: Father(V18.0, Z83.3)  
                                                    Status:Active  
   
                                                    Family history of lung cance  
r: Father(V16.1, Z80.1)  
                                                    Status:Active  
  
                              Unknown Family Member  
  
                                Name            Dates           Details  
   
                                                    Family history of cardiac di  
sorder: Mother(V17.49, Z82.49)  
                                                    Status:Active  
   
                                                    Family history of malignant   
neoplasm of prostate: Father(V16.42,   
Z80.42)  
                                                    Status:Active  
   
                                                    Family history of hypertensi  
on: Father(V17.49, Z82.49)  
                                                    Status:Active  
   
                                                    Family history of hypothyroi  
dism: Sister(V18.19, Z83.49)  
                                                    Status:Active  
   
                                                    Family history of malignant   
neoplasm of stomach: Maternal Great   
Grandmother(V16.0, Z80.0)  
                                                    Status:Active  
   
                                                    Family history of malignant   
neoplasm of breast: Maternal   
Aunt(V16.3, Z80.3)  
                                                    Status:Active  
   
                                                    Family history of diabetes m  
ellitus: Father(V18.0, Z83.3)  
                                                    Status:Active  
   
                                                    Family history of lung cance  
r: Father(V16.1, Z80.1)  
                                                    Status:Active  
  
                              Unknown Family Member  
  
                                Name            Dates           Details  
   
                                                    Family history of cardiac di  
sorder: Mother(V17.49, Z82.49)  
                                                    Status:Active  
   
                                                    Family history of malignant   
neoplasm of prostate: Father(V16.42,   
Z80.42)  
                                                    Status:Active  
   
                                                    Family history of hypertensi  
on: Father(V17.49, Z82.49)  
                                                    Status:Active  
   
                                                    Family history of hypothyroi  
dism: Sister(V18.19, Z83.49)  
                                                    Status:Active  
   
                                                    Family history of malignant   
neoplasm of stomach: Maternal Great   
Grandmother(V16.0, Z80.0)  
                                                    Status:Active  
   
                                                    Family history of malignant   
neoplasm of breast: Maternal   
Aunt(V16.3, Z80.3)  
                                                    Status:Active  
   
                                                    Family history of diabetes m  
ellitus: Father(V18.0, Z83.3)  
                                                    Status:Active  
   
                                                    Family history of lung cance  
r: Father(V16.1, Z80.1)  
                                                    Status:Active  
  
                              Unknown Family Member  
  
                                Name            Dates           Details  
   
                                                    Family history of cardiac di  
sorder: Mother(V17.49, Z82.49)  
                                                    Status:Active  
   
                                                    Family history of malignant   
neoplasm of prostate: Father(V16.42,   
Z80.42)  
                                                    Status:Active  
   
                                                    Family history of hypertensi  
on: Father(V17.49, Z82.49)  
                                                    Status:Active  
   
                                                    Family history of hypothyroi  
dism: Sister(V18.19, Z83.49)  
                                                    Status:Active  
   
                                                    Family history of malignant   
neoplasm of stomach: Maternal Great   
Grandmother(V16.0, Z80.0)  
                                                    Status:Active  
   
                                                    Family history of malignant   
neoplasm of breast: Maternal   
Aunt(V16.3, Z80.3)  
                                                    Status:Active  
   
                                                    Family history of diabetes m  
ellitus: Father(V18.0, Z83.3)  
                                                    Status:Active  
   
                                                    Family history of lung cance  
r: Father(V16.1, Z80.1)  
                                                    Status:Active  
  
                              Unknown Family Member  
  
                                Name            Dates           Details  
   
                                                    Family history of cardiac di  
sorder: Mother(V17.49, Z82.49)  
                                                    Status:Active  
   
                                                    Family history of malignant   
neoplasm of prostate: Father(V16.42,   
Z80.42)  
                                                    Status:Active  
   
                                                    Family history of hypertensi  
on: Father(V17.49, Z82.49)  
                                                    Status:Active  
   
                                                    Family history of hypothyroi  
dism: Sister(V18.19, Z83.49)  
                                                    Status:Active  
   
                                                    Family history of malignant   
neoplasm of stomach: Maternal Great   
Grandmother(V16.0, Z80.0)  
                                                    Status:Active  
   
                                                    Family history of lung cance  
r: Father(V16.1, Z80.1)  
                                                    Status:Active  
   
                                                    Family history of diabetes m  
ellitus: Father(V18.0, Z83.3)  
                                                    Status:Active  
   
                                                    Family history of malignant   
neoplasm of breast: Maternal   
Aunt(V16.3, Z80.3)  
                                                    Status:Active  
  
                              Unknown Family Member  
  
                                Name            Dates           Details  
   
                                                    Family history of cardiac di  
sorder: Mother(V17.49, Z82.49)  
                                                    Status:Active  
   
                                                    Family history of malignant   
neoplasm of prostate: Father(V16.42,   
Z80.42)  
                                                    Status:Active  
   
                                                    Family history of hypertensi  
on: Father(V17.49, Z82.49)  
                                                    Status:Active  
   
                                                    Family history of hypothyroi  
dism: Sister(V18.19, Z83.49)  
                                                    Status:Active  
   
                                                    Family history of malignant   
neoplasm of stomach: Maternal Great   
Grandmother(V16.0, Z80.0)  
                                                    Status:Active  
   
                                                    Family history of lung cance  
r: Father(V16.1, Z80.1)  
                                                    Status:Active  
   
                                                    Family history of diabetes m  
ellitus: Father(V18.0, Z83.3)  
                                                    Status:Active  
   
                                                    Family history of malignant   
neoplasm of breast: Maternal   
Aunt(V16.3, Z80.3)  
                                                    Status:Active  
  
                              Unknown Family Member  
  
                                Name            Dates           Details  
   
                                                    Family history of cardiac di  
sorder: Mother(V17.49, Z82.49)  
                                                    Status:Active  
   
                                                    Family history of malignant   
neoplasm of prostate: Father(V16.42,   
Z80.42)  
                                                    Status:Active  
   
                                                    Family history of hypertensi  
on: Father(V17.49, Z82.49)  
                                                    Status:Active  
   
                                                    Family history of hypothyroi  
dism: Sister(V18.19, Z83.49)  
                                                    Status:Active  
   
                                                    Family history of malignant   
neoplasm of stomach: Maternal Great   
Grandmother(V16.0, Z80.0)  
                                                    Status:Active  
   
                                                    Family history of malignant   
neoplasm of breast: Maternal   
Aunt(V16.3, Z80.3)  
                                                    Status:Active  
   
                                                    Family history of diabetes m  
ellitus: Father(V18.0, Z83.3)  
                                                    Status:Active  
   
                                                    Family history of lung cance  
r: Father(V16.1, Z80.1)  
                                                    Status:Active  
  
                              Unknown Family Member  
  
                                Name            Dates           Details  
   
                                                    Family history of cardiac di  
sorder: Mother(V17.49, Z82.49)  
                                                    Status:Active  
   
                                                    Family history of malignant   
neoplasm of prostate: Father(V16.42,   
Z80.42)  
                                                    Status:Active  
   
                                                    Family history of hypertensi  
on: Father(V17.49, Z82.49)  
                                                    Status:Active  
   
                                                    Family history of hypothyroi  
dism: Sister(V18.19, Z83.49)  
                                                    Status:Active  
   
                                                    Family history of malignant   
neoplasm of stomach: Maternal Great   
Grandmother(V16.0, Z80.0)  
                                                    Status:Active  
   
                                                    Family history of malignant   
neoplasm of breast: Maternal   
Aunt(V16.3, Z80.3)  
                                                    Status:Active  
   
                                                    Family history of diabetes m  
ellitus: Father(V18.0, Z83.3)  
                                                    Status:Active  
   
                                                    Family history of lung cance  
r: Father(V16.1, Z80.1)  
                                                    Status:Active  
  
                              Unknown Family Member  
  
                                Name            Dates           Details  
   
                                                    Family history of cardiac di  
sorder: Mother(V17.49, Z82.49)  
                                                    Status:Active  
   
                                                    Family history of malignant   
neoplasm of prostate: Father(V16.42,   
Z80.42)  
                                                    Status:Active  
   
                                                    Family history of hypertensi  
on: Father(V17.49, Z82.49)  
                                                    Status:Active  
   
                                                    Family history of hypothyroi  
dism: Sister(V18.19, Z83.49)  
                                                    Status:Active  
   
                                                    Family history of malignant   
neoplasm of stomach: Maternal Great   
Grandmother(V16.0, Z80.0)  
                                                    Status:Active  
   
                                                    Family history of lung cance  
r: Father(V16.1, Z80.1)  
                                                    Status:Active  
   
                                                    Family history of diabetes m  
ellitus: Father(V18.0, Z83.3)  
                                                    Status:Active  
   
                                                    Family history of malignant   
neoplasm of breast: Maternal   
Aunt(V16.3, Z80.3)  
                                                    Status:Active  
  
  
  
Advance Directives  
No Advanced Directives Records FoundDocuments on File  
  
                          Type         Date Recorded Patient Representative Expl  
anation  
   
                          Advance Directives and Living Will                      
         
   
                          Power of                              
  
                                Documents on File  
  
                          Type         Date Recorded Patient Representative Expl  
anation  
   
                          ACP-Advance Directive                             
   
                          ACP-Power of                              
  
  
  
Reason for Referral  
  
  
                          Status       Reason       Specialty    Diagnoses /   
Procedures                Referred By Contact       Referred To   
Contact  
   
                          Authorized                Radiology      
  
  
Diagnoses  
  
  
Nontoxic   
multinodular goiter  
  
  
  
Procedures  
  
  
US Thyroid                                
  
  
Tano Redmond MD  
  
  
[address]   
[place] [address]  
  
  
Phone: [phone]  
  
  
Fax: [phone]                         
  
  
[place] [address]  
  
  
Phone:   
[phone]  
  
  
  
                    Status    Reason    Specialty Diagnoses / Procedures Referre  
d By Contact Referred To   
Contact  
   
                          Open                      Radiology      
  
  
Diagnoses  
  
  
Nontoxic multinodular   
goiter  
  
  
  
Procedures  
  
  
US Guided Thyroid   
Biopsy Percutaneous                       
  
  
Tano Redmond MD  
  
  
[address] [place] [address]  
  
  
Phone: [phone]  
  
  
Fax: [phone]                         
  
  
  
  
  
                          Status       Reason       Specialty    Diagnoses /   
Procedures                Referred By Contact       Referred To   
Contact  
   
                          Authorized                Radiology      
  
  
Diagnoses  
  
  
Nontoxic   
multinodular goiter  
  
  
  
Procedures  
  
  
US Guided Thyroid   
Biopsy Percutaneous                       
  
  
Tano Redmond MD  
  
  
[address]   
[place] [address]  
  
  
Phone:   
  
  
Fax: [phone]                         
  
  
[place] [address]  
  
  
Phone:   
[phone]  
  
  
  
                    Status    Reason    Specialty Diagnoses / Procedures Referre  
d By Contact Referred To   
Contact  
   
                          Open                      Radiology      
  
  
Diagnoses  
  
  
Nontoxic multinodular   
goiter  
  
  
  
Procedures  
  
  
US THYROID                                
  
  
Shila Chapin,   
APRN - CNP  
  
  
[address]  
  
  
[place] [address]  
  
  
Phone:   
  
  
Fax: [phone]                         
  
  
  
  
  
                          Status       Reason       Specialty    Diagnoses /   
Procedures                Referred By Contact       Referred To   
Contact  
   
                          Authorized                Radiology      
  
  
Diagnoses  
  
  
Nontoxic   
multinodular goiter  
  
  
  
Procedures  
  
  
US Thyroid                                
  
  
Tano Redmond MD  
  
  
[address]   
[place] [address]  
  
  
Phone:   
  
  
Fax: [phone]                         
  
  
[place] [address]  
  
  
Phone:   
[phone]  
  
  
  
                          Specialty    Diagnoses / Procedures Referred By Contac  
t Referred To Contact  
   
                                        Neurology             
  
  
Diagnoses  
  
  
Numbness and tingling of   
both feet  
                                          
  
  
Yumiko Smart MD  
  
  
 [address]  
  
  
[place] [address]  
  
  
Phone: [phone]  
  
  
Fax: [phone]                         
  
  
Amilcar Elias MD  
  
  
[facility] [address]  
  
  
[place] [address]  
  
  
Phone: [phone]  
  
  
Fax: [phone]  
  
  
  
                    Referral ID Status    Reason    Start Date Expiration Date V  
isits   
Requested                               Visits   
Authorized  
   
                [id number] Authorized         3/16/2023 3/15/2024 1       1  
  
  
  
                          Specialty    Diagnoses / Procedures Referred By Contac  
t Referred To Contact  
   
                                        Radiology             
  
  
Diagnoses  
  
  
Left elbow pain  
  
  
History of trauma  
  
  
  
Procedures  
  
  
XR elbow left 3+ views                    
  
  
Yumiko Smart MD  
  
  
 [address]  
  
  
[place] [address]  
  
  
Phone: [phone]  
  
  
Fax: [phone]                         
  
  
  
  
  
                          Referral ID  Status       Reason       Start   
Date                                    Expiration   
Date                                    Visits   
Requested                               Visits   
Authorized  
   
                                [id number]          Authorized        
  
  
Perform   
Procedure       10/5/2023       2024        1               1  
  
  
  
                          Specialty    Diagnoses / Procedures Referred By Contac  
t Referred To Contact  
   
                                        Radiology             
  
  
Diagnoses  
  
  
Postmenopausal  
  
  
  
Procedures  
  
  
XR DEXA bone density                      
  
  
Yumiko Smart MD  
  
  
 [place] [address]  
  
  
Phone: [phone]  
  
  
Fax: [phone]                         
  
  
  
  
  
                          Referral ID  Status       Reason       Start   
Date                                    Expiration   
Date                                    Visits   
Requested                               Visits   
Authorized  
   
                                        [id number]              Pending   
Review                                    
  
  
Perform   
Procedure       10/5/2023       2024        1               1  
  
  
  
                          Specialty    Diagnoses / Procedures Referred By Contac  
t Referred To Contact  
   
                                        Radiology             
  
  
Diagnoses  
  
  
Encounter for screening   
mammogram for breast cancer  
  
  
  
Procedures  
  
  
BI mammo bilateral screening   
tomosynthesis                             
  
  
Yumiko Smart MD  
  
  
 [place] [address]  
  
  
Phone: [phone]  
  
  
Fax: [phone]                         
  
  
  
  
  
                          Referral ID  Status       Reason       Start   
Date                                    Expiration   
Date                                    Visits   
Requested                               Visits   
Authorized  
   
                                [id number]          Authorized        
  
  
Perform   
Procedure       10/5/2023       2024        1               1  
  
  
  
                          Specialty    Diagnoses / Procedures Referred By Contac  
t Referred To Contact  
   
                                                    Pain Management / Pain   
Medicine                                  
  
  
Diagnoses  
  
  
Lumbar back pain with   
radiculopathy affecting   
lower extremity  
                                          
  
  
Jose Maria Gonsalez, KIA  
  
  
[address] [id number]  
  
  
Phone: [phone]  
  
  
Fax: [phone]                         
  
  
Mic Olivier DO  
  
  
[address] [place] [address]  
  
  
Phone: [phone]  
  
  
Fax: [phone]  
  
  
  
                    Referral ID Status    Reason    Start Date Expiration Date V  
isits   
Requested                               Visits   
Authorized  
   
                [id number] Authorized         10/26/2023 10/25/2024 1       1  
  
  
  
                                Specialty       Diagnoses / Procedures Referred   
By   
Contact                                 Referred To   
Contact  
   
                                        Gastroenterology      
  
  
Diagnoses  
  
  
Gastroesophageal reflux disease   
without esophagitis  
  
  
Chronic diarrhea  
  
  
S/P laparoscopic cholecystectomy  
  
  
Bariatric surgery status  
  
  
Poor appetite  
  
  
  
Procedures  
  
  
EGD  
  
  
ND ESOPHAGOGASTRODUODENOSCOPY   
TRANSORAL DIAGNOSTIC  
  
  
ND EGD TRANSORAL BIOPSY   
SINGLE/MULTIPLE                           
  
  
Yumiko Smart MD  
  
  
 [place] [address]  
  
  
Phone:   
[phone]  
  
  
Fax: [phone]                         
  
  
  
  
  
                    Referral ID Status    Reason    Start Date Expiration Date V  
isits   
Requested                               Visits   
Authorized  
   
                                        [id number]             Pending   
Review                    2023    1            1  
  
  
  
                          Specialty    Diagnoses / Procedures Referred By Contac  
t Referred To Contact  
   
                                        Gastroenterology      
  
  
Diagnoses  
  
  
Chronic diarrhea  
  
  
S/P laparoscopic   
cholecystectomy  
  
  
  
Procedures  
  
  
Colonoscopy Screening;   
Average Risk Patient  
  
  
ND COLONOSCOPY FLX DX   
W/COLLJ SPEC WHEN PFRMD  
  
  
ND COLON CA SCRN NOT HI RSK   
IND  
  
  
ND COLORECTAL SCRN; HI RISK   
IND  
  
  
ND COLONOSCOPY W/BIOPSY   
SINGLE/MULTIPLE  
  
  
ND COLSC FLX W/RMVL OF TUMOR   
POLYP LESION SNARE TQ  
  
  
ND COLSC FLX W/REMOVAL   
LESION BY HOT BX FORCEPS                  
  
  
Yumiko Smart MD  
  
  
 [address]  
  
  
[place] [address]  
  
  
Phone: [phone]  
  
  
Fax: [phone]                         
  
  
  
  
  
                    Referral ID Status    Reason    Start Date Expiration Date V  
isits   
Requested                               Visits   
Authorized  
   
                                        [id number]             Pending   
Review                    2023    1            1  
  
  
  
                    Referral ID Status    Reason    Start Date Expiration Date V  
isits   
Requested                               Visits   
Authorized  
   
                [id number] Authorized         2023 1       1  
  
  
  
                          Specialty    Diagnoses / Procedures Referred By Contac  
t Referred To Contact  
   
                                                                  
  
  
Zack Pastor MD  
  
  
[address] [place], [place] [address]  
  
  
Phone: [phone]  
  
  
Fax: [phone]                         
  
  
  
  
  
                    Referral ID Status    Reason    Start Date Expiration Date V  
isits   
Requested                               Visits   
Authorized  
   
                [id number] Authorized         2024 1       1  
  
  
  
                                Specialty       Diagnoses / Procedures Referred   
By   
Contact                                 Referred To   
Contact  
   
                                        Gastroenterology      
  
  
Diagnoses  
  
  
GERD (gastroesophageal reflux   
disease)  
  
  
  
Procedures  
  
  
EGD  
  
  
ND ESOPHAGOGASTRODUODENOSCOPY   
TRANSORAL DIAGNOSTIC  
  
  
ND EGD TRANSORAL BIOPSY   
SINGLE/MULTIPLE                           
  
  
Padmini Valiente MD  
  
  
 [address]  
  
  
[place] [address]  
  
  
Phone:   
[phone]  
  
  
Fax: [phone]                         
  
  
  
  
  
                    Referral ID Status    Reason    Start Date Expiration Date V  
isits   
Requested                               Visits   
Authorized  
   
                                        [id number]             Pending   
Review                    2024    1            1  
  
  
  
                                Specialty       Diagnoses / Procedures Referred   
By   
Contact                                 Referred To   
Contact  
   
                                        Gastroenterology      
  
  
Diagnoses  
  
  
Gastroesophageal reflux disease,   
unspecified whether esophagitis   
present  
  
  
  
Procedures  
  
  
EGD  
  
  
ND ESOPHAGOGASTRODUODENOSCOPY   
TRANSORAL DIAGNOSTIC  
  
  
ND EGD TRANSORAL BIOPSY   
SINGLE/MULTIPLE                           
  
  
Padmini Valiente MD  
  
  
 [address]  
  
  
[place] [address]  
  
  
Phone:   
[phone]  
  
  
Fax: [phone]                         
  
  
  
  
  
                    Referral ID Status    Reason    Start Date Expiration Date V  
isits   
Requested                               Visits   
Authorized  
   
                                        [id number]             Pending   
Review                    2024    1            1  
  
  
  
Assessments  
  
  
                                                    Diagnosis  
   
                                                      
  
  
Nontoxic multinodular goiter  
   
                                                      
  
  
Reactive hypoglycemia  
  
  
Hypoglycemia, unspecified  
  
  
  
                                                    Diagnosis  
   
                                                      
  
  
Nontoxic multinodular goiter  
  
  
  
                                                    Diagnosis  
   
                                                      
  
  
Nontoxic multinodular goiter  
  
  
  
Discharge Instructions  
* Attachments  
  
The following attachments cannot be sent through [facility] Everywhere.  
  
* Thyroid: Biopsy: Fine-Needle: Post-op (English)  
  
documented in this encounter* Attachments  
  
The following attachments cannot be sent through [facility] Everywhere.  
  
* Thyroid Nodules (English)  
* Thyroid: Biopsy: Fine-Needle: Post-op (English)  
  
documented in this encounter  
  
History of Present Illness  
* Jin Carlos, RN - 2019 10:30 AM EST  
  
US guided thyroid biopsy completed. Pt kerrie well no new co pain post procedure. 
Band aide applied toneck. Will dc to home  
  
  
  
Electronically signed by Jin Carlos RN at 2019 11:57 AM EST  
  
  
documented in this encounter* Dale Peterson, RN - 10/08/2019 12:25 PM EDT  
  
Ultrasound: This patient is here as a Direct Admit Outpatient for a Thyroid 
Bioposy . She verbalizes understanding of the procedural instructions. Dr. Leblanc
 has spoken to her. History, allergies, medications and lab results reviewed. 
Informed consent signed. Prepped and draped in sterile fashion. Time out was 
performed. She is on a monitor. Tolerated the procedure well. She is in no 
distress. Band aid to her right neck puncture site. No bleeding. No hematoma. 
She is applying a cold compress to the site. Home going instructions given. 
Discharge to home.  
  
  
  
Electronically signed by Dale Peterson RN at 10/08/2019 1:09 PM EDT  
  
  
documented in this encounter  
  
Chief Complaint  
6 MONTH F/U (NO LABS DONE). TAKING AMLODIPINE 1/2 BID1 MO FU LAB AND BONE 
DENSITY OMEPRAZOLE IS WORKING FOR HER STOMACH ISSUES SHE DOES FLU VACCINE WITH
PHARMACY1 MO FU LAB AND BONE DENSITY OMEPRAZOLE IS WORKING FOR HER STOMACH 
ISSUES SHE DOES FLU VACCINE WITHPHARMACY1.5 MO F/U BP CHECKC/O DIGESTIVE ISSUES-
DIARRHEA OFF/ON WITH ABDOMINAL CRAMPING AND BLOATING-HAS MULTIPLE STOMACH SURGE
RY. C/O L EYE WATERY2 WK F/U CT/MED CHECK AND ABD PAIN-NO NEW COMPLAINTS TODAY7 
MO F/U DID NOT DO LABS. C/O DRY MOUTH.F/U WITH COLON/EGD. C/O RT SIDE PAIN 
WORSENINGWELCOME TO MEDICARE -- 6 WEEK F/U W LABS.WELCOME TO MEDICARE -- 6 WEEK 
F/U W LABS.3 WEEK F/U - US3 WEEK F/U - USC/O BRBPR LAST WK WITH DIARRHEA. NO 
BLOOD SINCEC/O BRBPR LAST WK WITH DIARRHEA. NO BLOOD SINCEPT HERE TODAY 3 WK F/U
 COLONOSCOPY AND LABS. PT HAS NO CONCERNS TODAYC/O BRBPR LAST WK WITH DIARRHEA. 
NO BLOOD SINCE  
  
Additional Source Comments  
  
  
  
                                                    INFORMATION SOURCE (unrecogn  
ized section and content)  
   
                                          
  
  
  
                                          
   
                                          
  
  
  
                                DATE CREATED    AUTHOR          AUTHOR'S ORGANIZ  
ATION  
   
                                2019                      [facility]  
  
  
  
                                DATE CREATED    AUTHOR          AUTHOR'S ORGANIZ  
ATION  
   
                                2019                      [facility]  
dical Center  
  
  
  
                                DATE CREATED    AUTHOR          AUTHOR'S ORGANIZ  
ATION  
   
                                2021                      [facility]  
[facility]  
  
  
  
                                DATE CREATED    AUTHOR          AUTHOR'S ORGANIZ  
ATION  
   
                                2023                       [employer]  
  
  
  
                                DATE CREATED    AUTHOR          AUTHOR'S ORGANIZ  
ATION  
   
                                2023                      [facility]  
  
  
  
                                DATE CREATED    AUTHOR          AUTHOR'S ORGANIZ  
ATION  
   
                                2023                      [facility]  
ical Center  
  
  
  
                                DATE CREATED    AUTHOR          AUTHOR'S ORGANIZ  
ATION  
   
                                2023                       [facility]  
  
  
  
                                DATE CREATED    AUTHOR          AUTHOR'S ORGANIZ  
ATION  
   
                                10/05/2023                      [facility]  
  
  
  
                                DATE CREATED    AUTHOR          AUTHOR'S ORGANIZ  
ATION  
   
                                10/05/2023                      [facility]  
  
  
  
                                DATE CREATED    AUTHOR          AUTHOR'S ORGANIZ  
ATION  
   
                                10/28/2023                      [facility]   
nter  
  
  
  
                                DATE CREATED    AUTHOR          AUTHOR'S ORGANIZ  
ATION  
   
                                12/10/2023                      [facility]  
al  
  
  
  
                                DATE CREATED    AUTHOR          AUTHOR'S ORGANIZ  
ATION  
   
                                12/10/2023                      [facility]  
  
  
  
                                DATE CREATED    AUTHOR          AUTHOR'S ORGANIZ  
ATION  
   
                                2024                      [facility]  
  
  
  
                                DATE CREATED    AUTHOR          AUTHOR'S ORGANIZ  
ATION  
   
                                2024                      [facility]  
  
  
  
  
  
                                                    Source Comments (unrecognize  
d section and content)  
   
                                                    In the event this informatio  
n is protected by the Federal Confidentiality of   
Alcohol   
and Drug Abuse Patient Records regulations: This information has been disclosed 
to   
you from records protected by Federal confidentiality rules (42 CFR Part 2). The
   
Federal rules prohibit you from making any further disclosure of this 
information   
unless further disclosure is expressly permitted by the written consent of the 
person   
to whom it pertains or as otherwise permitted by 42 CFR Part 2. A general   
authorization for the release of medical or other information is NOT sufficient 
for   
this purpose. The Federal rules restrict any use of the information to 
criminally   
investigate or prosecute any alcohol or drug abuse patient.[facility]  
  
  
  
  
                                                    Care Teams (unrecognized sec  
tion and content)  
   
                                          
  
  
  
                                          
   
                                          
  
  
  
                      Team Member Relationship Specialty  Start Date End Date  
   
                                                      
  
  
Yumiko Smart MD  
  
  
NPI: [id number]  
  
  
 [place] [address]  
  
  
[phone] (Work)  
  
  
[phone] (Fax) PCP - General                   18           
   
                                                      
  
  
Yumiko Smart MD  
  
  
NPI: [id number]  
  
  
 [place] [address]  
  
  
[phone] (Work)  
  
  
[phone] (Fax)                      PCP - [facility] ACO Attributed   
Provider                                22                
  
  
  
                      Team Member Relationship Specialty  Start Date End Date  
   
                                                      
  
  
Omar Alamo MD  
  
  
NPI: [id number]  
  
  
 [address] [place] [id number] [phone] (Work)  
  
  
[phone] (Fax) PCP - General   Family Medicine 3/1/18            
  
  
  
                      Team Member Relationship Specialty  Start Date End Date  
   
                                                      
  
  
Yumiko Smart MD  
  
  
NPI: [id number]  
  
  
 [address]  
  
  
[place] [address]  
  
  
[phone] (Work)  
  
  
[phone] (Fax) PCP - General                   18           
   
                                                      
  
  
Yumiko Smart MD  
  
  
NPI: [id number]  
  
  
 [place] [address]  
  
  
[phone] (Work)  
  
  
[phone] (Fax)                      PCP - [facility] Attributed   
Provider                                22                
   
                                                      
  
  
Padmaja Doyle, RN Care Manager    Case Management 23           
  
  
  
                      Team Member Relationship Specialty  Start Date End Date  
   
                                                      
  
  
Omar Alamo MD  
  
  
NPI: [id number]  
  
  
 [place] [id number] [phone] (Work)  
  
  
[phone] (Fax) PCP - General   Family Medicine 3/1/18            
  
  
  
                      Team Member Relationship Specialty  Start Date End Date  
   
                                                      
  
  
Yumiko Smart MD  
  
  
NPI: [id number]  
  
  
 [address]  
  
  
[place] [address]  
  
  
[phone] (Work)  
  
  
[phone] (Fax) PCP - General                   18           
   
                                                      
  
  
Yumiko Smart MD  
  
  
NPI: [id number]  
  
  
 [address]  
  
  
[place] [address]  
  
  
[phone] (Work)  
  
  
[phone] (Fax)                      [facility] Attributed   
Provider                                22                
  
  
  
                      Team Member Relationship Specialty  Start Date End Date  
   
                                                      
  
  
Omar Alamo MD  
  
  
NPI: [id number]  
  
  
 [address]  
  
  
[place] [address]  
  
  
[phone] (Work)  
  
  
[phone] (Fax) PCP - General   Family Medicine 3/1/18            
  
  
  
                      Team Member Relationship Specialty  Start Date End Date  
   
                                                      
  
  
Omar Alamo MD  
  
  
NPI: [id number]  
  
  
 [address]  
  
  
[place] [address]  
  
  
[phone] (Work)  
  
  
[phone] (Fax) PCP - General   Family Medicine 3/1/18            
  
  
  
                      Team Member Relationship Specialty  Start Date End Date  
   
                                                      
  
  
Omar Alamo MD  
  
  
NPI: [id number]  
  
  
 [address]  
  
  
[place] [address]  
  
  
[phone] (Work)  
  
  
[phone] (Fax) PCP - General   Family Medicine 3/1/18            
  
  
  
                      Team Member Relationship Specialty  Start Date End Date  
   
                                                      
  
  
Omar Alamo MD  
  
  
NPI: [id number]  
  
  
 [address]  
  
  
[place] [address]  
  
  
[phone] (Work)  
  
  
[phone] (Fax) PCP - General   Family Medicine 3/1/18            
  
  
  
                      Team Member Relationship Specialty  Start Date End Date  
   
                                                      
  
  
Yumiko Samrt MD  
  
  
NPI: [id number]  
  
  
 [address]  
  
  
[place] [address]  
  
  
[phone] (Work)  
  
  
[phone] (Fax) PCP - General                   18           
   
                                                      
  
  
Yumiko Smart MD  
  
  
NPI: [id number]  
  
  
 [place] [address]  
  
  
[phone] (Work)  
  
  
[phone] (Fax)                      PCP - [facility] Attributed   
Provider                                22                
  
  
  
                      Team Member Relationship Specialty  Start Date End Date  
   
                                                      
  
  
Yumiko Smart MD  
  
  
NPI: [id number]  
  
  
 [address]  
  
  
[place] [address]  
  
  
[phone] (Work)  
  
  
[phone] (Fax) PCP - General                   18           
   
                                                      
  
  
Yumiko Smart MD  
  
  
NPI: [id number]  
  
  
 [address] [place]  
  
  
[place] [address]  
  
  
[phone] (Work)  
  
  
[phone] (Fax)                      PCP - [facility] Attributed   
Provider                                22                
  
  
  
                      Team Member Relationship Specialty  Start Date End Date  
   
                                                      
  
  
Yumiko Smart MD  
  
  
NPI: [id number]  
  
  
 [address]  
  
  
[place] [address]  
  
  
[phone] (Work)  
  
  
[phone] (Fax) PCP - General                   18           
   
                                                      
  
  
Yumiko Smart MD  
  
  
NPI: [id number]  
  
  
 [address]  
  
  
[place] [address]  
  
  
[phone] (Work)  
  
  
[phone] (Fax)                      PCP - [facility] Attributed   
Provider                                22                
  
  
  
  
  
                                                    Reason for Visit (unrecogniz  
ed section and content)  
   
                                          
  
  
  
                                          
   
                                          
  
  
  
                          Specialty    Diagnoses / Procedures Referred By Contac  
t Referred To Contact  
   
                                        Primary Care          
  
  
Procedures  
  
  
Follow Up In Primary Care                 
  
  
Yumiko Smart MD  
  
  
 [address]  
  
  
[place] [address]  
  
  
Phone: [phone]  
  
  
Fax: [phone]                         
  
  
  
  
  
                Referral ID Status  Reason  Start Date Expiration Date Visits Re  
quested Visits   
Authorized  
   
                [id number]   Closed          3/13/2023 2023 1       1  
  
  
  
                          Specialty    Diagnoses / Procedures Referred By Contac  
t Referred To Contact  
   
                                        Neurology             
  
  
Diagnoses  
  
  
Numbness and tingling of   
both feet  
                                          
  
  
Yumiko Smart MD  
  
  
 [address]  
  
  
[place] [address]  
  
  
Phone: [phone]  
  
  
Fax: [phone]                         
  
  
Amilcar Elias MD  
  
  
[facility]  
  
  
[address] [address]  
  
  
Phone: [phone]  
  
  
Fax: [phone]  
  
  
  
                    Referral ID Status    Reason    Start Date Expiration Date V  
isits   
Requested                               Visits   
Authorized  
   
                                        [id number]            Pending   
Review                    3/16/2023    3/15/2024    1            1  
  
  
  
                                        Reason              Comments  
   
                                        Follow-up           3 WEEK F/U. PATIENT   
HAD CHOLECYSTECTOMY 23. STILL HAS  
  
  
  
                                        Reason              Comments  
   
                                        Foot Problem        Patient states her f  
eet wake her up with pins and needle feeling.   
Tightness in toes and arches.she has been taking tylenol and soaking in   
Epsom salts.  
  
  
  
                                        Reason              Comments  
   
                                        Medicare Annual Wellness Visit Subsequen  
t MEDICARE WELLNESS + 4 MONTH F/U +   
LABSPT   
STATES SHE SLID ON THE FLOOR WHILE MAKING   
CHOCOLATE AND HURT HER LEFT ELBOW  AND STILL IS HAVING PAIN C/O HAVING   
EXTREME LOSE STOOLS SINCE HER GALLBLADDER   
SURGERY IN APRIL.  
  
  
  
                                        Reason              Comments  
   
                                        Follow-up           Follow up neuropathy  
. Gabapentin has helped some  
  
  
  
                                        Reason              Comments  
   
                                        Follow-up           Patient is here for   
a follow up after pain management. She has an   
appointment with Dr Nura Enriquez at [facility].  
  
  
  
                                        Reason              Comments  
   
                                        Follow-up           2 MONTH F/U XRAY LEF  
T ELBOW, MAMMOGRAM, DEXA BONE DENSITY. C/O BACK   
PAIN   
S/P FALL IN 2023 - SCHEDULED FOR MRI SPINE IN JANUARY. C/O CHRONIC   
DIARRHEA SINCE CHOLECYSTECTOMY AND DECREASED  
  
  
  
                                        Reason              Comments  
   
                                        Follow-up           Patient presents for  
 check up. Patient had canceled surgery right   
foot.   
Patient states that she feels she has too much going on right now.  
  
  
  
                          Specialty    Diagnoses / Procedures Referred By Contac  
t Referred To Contact  
   
                                                              
  
  
Diagnoses  
  
  
Noninfective gastroenteritis   
and colitis, unspecified  
  
  
Acquired absence of other   
specified parts of digestive   
tract  
  
  
Gastro-esophageal reflux   
disease without esophagitis  
  
  
Anorexia  
  
  
  
Procedures  
  
  
ND COLONOSCOPY FLX DX W/COLLJ   
SPEC WHEN PFRMD  
  
  
ND COLSC FLX W/RMVL OF TUMOR   
POLYP LESION SNARE TQ  
  
  
ND COLONOSCOPY W/BIOPSY   
SINGLE/MULTIPLE  
  
  
ND COLSC FLX W/REMOVAL LESION   
BY HOT BX FORCEPS                         
  
                                          
  
  
[id number]  
  
  
[address]  
  
  
[facility] [address]  
  
  
[place] [address]  
  
  
Phone: [phone]   
[phone]  
  
  
Fax: [phone]  
  
  
  
                Referral ID Status  Reason  Start Date Expiration Date Visits Re  
quested Visits   
Authorized  
   
                                                 1       1  
  
  
  
                                Specialty       Diagnoses / Procedures Referred   
By   
Contact                                 Referred To   
Contact  
   
                                        Gastroenterology      
  
  
Diagnoses  
  
  
GERD (gastroesophageal reflux   
disease)  
  
  
  
Procedures  
  
  
EGD  
  
  
ND ESOPHAGOGASTRODUODENOSCOPY   
TRANSORAL DIAGNOSTIC  
  
  
ND EGD TRANSORAL BIOPSY   
SINGLE/MULTIPLE                           
  
  
Padmini Valiente MD  
  
  
 [address]  
  
  
[facility] [address]  
  
  
[place] [address]  
  
  
Phone:   
[phone]  
  
  
Fax: [phone]                         
  
  
  
  
  
                    Referral ID Status    Reason    Start Date Expiration Date V  
isits   
Requested                               Visits   
Authorized  
   
                                        [id number]             Pending   
Review                    2024    1            1  
  
  
FOR RECORDS PERTAINING TO PATIENTS WHO ARE OR HAVE BEEN ENROLLED IN A CHEMICAL 
DEPENDENCY/SUBSTANCEABUSE PROGRAM, SOME INFORMATION MAY BE OMITTED. This 
clinical summary was aggregated from multiple sources. Caution should be 
exercised in using it in the provision of clinical care. This summary normalizes
 information from multiple sources, and as a consequence, information in this 
document may materially change the coding, format and clinical context of 
patient data. In addition, data may be omitted in some cases. CLINICAL DECISIONS
 SHOULD BE BASED ON THE PRIMARY CLINICAL RECORDS. [facility] [employer] provides
 no warranty or guarantee of the accuracy or completeness of information in this
 document.

## 2024-03-04 NOTE — MRI_ITS
REPORT-ID:[id number] 
 
STUDY:   MRI CERVICAL SPINE WITHOUT CONTRAST 
 
REASON FOR EXAM:   Female, 68 years old.  Right neck pain with bilateral 
arm numbness. Fall injury on 8/2023. 
 
TECHNIQUE:   Standardized fat and water weighted pulse sequences were 
obtained in the sagittal and axial planes. 
 
COMPARISON:   None 
___________________________________ 
 
FINDINGS: 
Normal foramen magnum and brainstem-cervical cord junction.   Normal 
craniovertebral junction. Normal anterior atlantoaxial articulation. 
Normal odontoid process. 
 
Normal cervical lordosis.  Normal vertebral bodies and posterior osseous 
elements. 
 
C2-3:  Normal endplates.  Normal disc height, signal and morphology. 
Normal central canal and intervertebral neural foramina. 
 
C3-4:  Normal endplates. Normal disc height and morphology. Normal central 
canal and right intervertebral neural foramen. Moderately pronounced 
stenosis of the left intervertebral neural foramen due to pronounced left 
degenerative facet arthropathy. 
 
C4-5:  Normal endplates. Minimal disc space height narrowing. Mild 
degenerative anterolisthesis of C4 on C5. Normal central canal and 
intervertebral neural foramina. 
 
C5-6:  Normal endplates. Normal disc height. Minimal ventral extradural 
defect due to tiny posterior bulging annulus. Normal central canal and 
intervertebral neural foramina. 
 
C6-7:  Normal endplates. Normal disc height. Mild ventral extradural defect 
due to posterior marginal spurs. Normal central canal and intervertebral 
neural foramina. 
 
C7-T1:  Normal endplates.  Normal disc height, signal and morphology. 
Normal central canal and intervertebral neural foramina. 
 
T1-T2, T2-3, T3-T4, T4-5 and T5-T6: (Sagittal only). Normal endplates. 
Normal disc height and morphology. Normal central canal and intervertebral 
neural foramina. 
 
Normal cervical cord.  Normal upper thoracic spinal cord. Normal included 
portions of the brainstem and cerebellum. Normal midline pituitary gland. 
 
Normal visualized soft tissue structures. 
___________________________________ 
 
ORDER #: [id number] MRI/Spine  Cervical (Routine)  
IMPRESSION:  
1.  Moderately pronounced stenosis of the left C3-C4 intervertebral neural  
foramen due to osteophytes arising from pronounced left degenerative facet  
arthropathy.  
 
  
2.  Mild degenerative anterolisthesis of C4 on C5.  
 
  
3.  Small C5-C6 posterior bulging annulus.  
 
  
4.  Mild ventral extradural defect at C6-C7 disc space level due to  
posterior marginal spurs.  
 
  
5.  No MRI evidence of cervical extruded disc fragment or cervical disc  
protrusion.  
 
  
6.  Normal cervical spinal cord.  
 
  
Electronically Signed:  
[place] MD  
2024/03/05 at 9:09 EST  
Reading Location ID and State: [address] / [place]  
Tel [phone], Service support  [phone], Fax [phone]

## 2024-03-13 ENCOUNTER — LAB (OUTPATIENT)
Dept: LAB | Facility: LAB | Age: 69
End: 2024-03-13
Payer: MEDICARE

## 2024-03-13 ENCOUNTER — HOSPITAL ENCOUNTER (OUTPATIENT)
Dept: RADIOLOGY | Facility: HOSPITAL | Age: 69
Discharge: HOME | End: 2024-03-13
Payer: MEDICARE

## 2024-03-13 DIAGNOSIS — Z01.811 PREOP RESPIRATORY EXAM: ICD-10-CM

## 2024-03-13 DIAGNOSIS — D64.9 ANEMIA, UNSPECIFIED TYPE: ICD-10-CM

## 2024-03-13 DIAGNOSIS — I10 BENIGN ESSENTIAL HTN: ICD-10-CM

## 2024-03-13 LAB
ANION GAP SERPL CALC-SCNC: 12 MMOL/L (ref 10–20)
BASOPHILS # BLD AUTO: 0.06 X10*3/UL (ref 0–0.1)
BASOPHILS NFR BLD AUTO: 1.2 %
BUN SERPL-MCNC: 15 MG/DL (ref 6–23)
CALCIUM SERPL-MCNC: 9.1 MG/DL (ref 8.6–10.3)
CHLORIDE SERPL-SCNC: 106 MMOL/L (ref 98–107)
CO2 SERPL-SCNC: 29 MMOL/L (ref 21–32)
CREAT SERPL-MCNC: 0.7 MG/DL (ref 0.5–1.05)
EGFRCR SERPLBLD CKD-EPI 2021: >90 ML/MIN/1.73M*2
EOSINOPHIL # BLD AUTO: 0.25 X10*3/UL (ref 0–0.7)
EOSINOPHIL NFR BLD AUTO: 5 %
ERYTHROCYTE [DISTWIDTH] IN BLOOD BY AUTOMATED COUNT: 13.2 % (ref 11.5–14.5)
GLUCOSE SERPL-MCNC: 91 MG/DL (ref 74–99)
HCT VFR BLD AUTO: 39.2 % (ref 36–46)
HGB BLD-MCNC: 12.7 G/DL (ref 12–16)
IMM GRANULOCYTES # BLD AUTO: 0.01 X10*3/UL (ref 0–0.7)
IMM GRANULOCYTES NFR BLD AUTO: 0.2 % (ref 0–0.9)
LYMPHOCYTES # BLD AUTO: 2.09 X10*3/UL (ref 1.2–4.8)
LYMPHOCYTES NFR BLD AUTO: 42.1 %
MCH RBC QN AUTO: 29 PG (ref 26–34)
MCHC RBC AUTO-ENTMCNC: 32.4 G/DL (ref 32–36)
MCV RBC AUTO: 90 FL (ref 80–100)
MONOCYTES # BLD AUTO: 0.55 X10*3/UL (ref 0.1–1)
MONOCYTES NFR BLD AUTO: 11.1 %
NEUTROPHILS # BLD AUTO: 2.01 X10*3/UL (ref 1.2–7.7)
NEUTROPHILS NFR BLD AUTO: 40.4 %
NRBC BLD-RTO: 0 /100 WBCS (ref 0–0)
PLATELET # BLD AUTO: 220 X10*3/UL (ref 150–450)
POTASSIUM SERPL-SCNC: 4.5 MMOL/L (ref 3.5–5.3)
RBC # BLD AUTO: 4.38 X10*6/UL (ref 4–5.2)
SODIUM SERPL-SCNC: 142 MMOL/L (ref 136–145)
WBC # BLD AUTO: 5 X10*3/UL (ref 4.4–11.3)

## 2024-03-13 PROCEDURE — 80048 BASIC METABOLIC PNL TOTAL CA: CPT

## 2024-03-13 PROCEDURE — 85025 COMPLETE CBC W/AUTO DIFF WBC: CPT

## 2024-03-13 PROCEDURE — 71046 X-RAY EXAM CHEST 2 VIEWS: CPT

## 2024-03-13 PROCEDURE — 36415 COLL VENOUS BLD VENIPUNCTURE: CPT

## 2024-03-14 ENCOUNTER — OFFICE VISIT (OUTPATIENT)
Dept: PRIMARY CARE | Facility: CLINIC | Age: 69
End: 2024-03-14
Payer: MEDICARE

## 2024-03-14 ENCOUNTER — TELEPHONE (OUTPATIENT)
Dept: PRIMARY CARE | Facility: CLINIC | Age: 69
End: 2024-03-14

## 2024-03-14 VITALS
SYSTOLIC BLOOD PRESSURE: 126 MMHG | DIASTOLIC BLOOD PRESSURE: 77 MMHG | HEART RATE: 77 BPM | WEIGHT: 164.2 LBS | HEIGHT: 64 IN | BODY MASS INDEX: 28.03 KG/M2

## 2024-03-14 DIAGNOSIS — R73.01 IMPAIRED FASTING GLUCOSE: ICD-10-CM

## 2024-03-14 DIAGNOSIS — G89.29 CHRONIC MIDLINE LOW BACK PAIN WITH BILATERAL SCIATICA: ICD-10-CM

## 2024-03-14 DIAGNOSIS — E78.5 HYPERLIPIDEMIA, UNSPECIFIED HYPERLIPIDEMIA TYPE: ICD-10-CM

## 2024-03-14 DIAGNOSIS — R20.0 NUMBNESS AND TINGLING OF BOTH FEET: ICD-10-CM

## 2024-03-14 DIAGNOSIS — R20.2 NUMBNESS AND TINGLING OF BOTH FEET: ICD-10-CM

## 2024-03-14 DIAGNOSIS — Z01.818 VISIT FOR PRE-OPERATIVE EXAMINATION: Primary | ICD-10-CM

## 2024-03-14 DIAGNOSIS — M54.42 CHRONIC MIDLINE LOW BACK PAIN WITH BILATERAL SCIATICA: ICD-10-CM

## 2024-03-14 DIAGNOSIS — I10 BENIGN ESSENTIAL HTN: ICD-10-CM

## 2024-03-14 DIAGNOSIS — M54.41 CHRONIC MIDLINE LOW BACK PAIN WITH BILATERAL SCIATICA: ICD-10-CM

## 2024-03-14 PROBLEM — M25.522 LEFT ELBOW PAIN: Status: RESOLVED | Noted: 2023-10-05 | Resolved: 2024-03-14

## 2024-03-14 PROBLEM — E74.39 GLUCOSE INTOLERANCE: Status: RESOLVED | Noted: 2023-02-06 | Resolved: 2024-03-14

## 2024-03-14 PROBLEM — Z86.39 H/O HYPOGLYCEMIA: Status: RESOLVED | Noted: 2023-02-06 | Resolved: 2024-03-14

## 2024-03-14 PROBLEM — R53.83 FATIGUE: Status: RESOLVED | Noted: 2023-02-06 | Resolved: 2024-03-14

## 2024-03-14 PROBLEM — R63.4 WEIGHT LOSS: Status: RESOLVED | Noted: 2023-02-06 | Resolved: 2024-03-14

## 2024-03-14 PROCEDURE — 99214 OFFICE O/P EST MOD 30 MIN: CPT | Performed by: INTERNAL MEDICINE

## 2024-03-14 PROCEDURE — 1160F RVW MEDS BY RX/DR IN RCRD: CPT | Performed by: INTERNAL MEDICINE

## 2024-03-14 PROCEDURE — 3008F BODY MASS INDEX DOCD: CPT | Performed by: INTERNAL MEDICINE

## 2024-03-14 PROCEDURE — 1123F ACP DISCUSS/DSCN MKR DOCD: CPT | Performed by: INTERNAL MEDICINE

## 2024-03-14 PROCEDURE — 1159F MED LIST DOCD IN RCRD: CPT | Performed by: INTERNAL MEDICINE

## 2024-03-14 PROCEDURE — 93000 ELECTROCARDIOGRAM COMPLETE: CPT | Performed by: INTERNAL MEDICINE

## 2024-03-14 PROCEDURE — 3078F DIAST BP <80 MM HG: CPT | Performed by: INTERNAL MEDICINE

## 2024-03-14 PROCEDURE — 3074F SYST BP LT 130 MM HG: CPT | Performed by: INTERNAL MEDICINE

## 2024-03-14 PROCEDURE — 1036F TOBACCO NON-USER: CPT | Performed by: INTERNAL MEDICINE

## 2024-03-14 PROCEDURE — 1158F ADVNC CARE PLAN TLK DOCD: CPT | Performed by: INTERNAL MEDICINE

## 2024-03-14 ASSESSMENT — ENCOUNTER SYMPTOMS
HEADACHES: 0
SHORTNESS OF BREATH: 0
EYES NEGATIVE: 1
GASTROINTESTINAL NEGATIVE: 1
LIGHT-HEADEDNESS: 0
WHEEZING: 0
FEVER: 0
DYSURIA: 0
CARDIOVASCULAR NEGATIVE: 1
VOMITING: 0
ENDOCRINE NEGATIVE: 1
WEAKNESS: 0
DIARRHEA: 0
BACK PAIN: 1
ABDOMINAL DISTENTION: 0
ABDOMINAL PAIN: 0
PALPITATIONS: 0
CONSTIPATION: 0
AGITATION: 0
RHINORRHEA: 0
PSYCHIATRIC NEGATIVE: 1
CHILLS: 0
COUGH: 0
NAUSEA: 0
DIZZINESS: 0
NUMBNESS: 1

## 2024-03-14 NOTE — TELEPHONE ENCOUNTER
PLEASE SEND A COPY OF MY TODAY'S NOTE TO HER SURGEON FOR CLEARANCE FOR BACK SURGERY ([facility]) . CALL THEIR OFFICE TOO THAT PT IS CLEAR.

## 2024-03-14 NOTE — PROGRESS NOTES
Subjective   Patient ID: Yolette Morrow is a 68 y.o. female who presents for Follow-up (Pre op clearance. Back surgery).  HPI  F/U ON LABS ,CXR.PREOP VISIT FOR LUMBAR SURGERY, FOR DISC HERNIATION AND FORAMINAL STENOSIS .HAS CHRONIC LOW BACK PAIN AND NUMBNESS OF FEET .DENIES HAVING CHEST PAIN OR SOB.  Review of Systems   Constitutional:  Negative for chills and fever.   HENT: Negative.  Negative for congestion, postnasal drip and rhinorrhea.    Eyes: Negative.  Negative for visual disturbance.   Respiratory:  Negative for cough, shortness of breath and wheezing.    Cardiovascular: Negative.  Negative for chest pain, palpitations and leg swelling.   Gastrointestinal: Negative.  Negative for abdominal distention, abdominal pain, constipation, diarrhea, nausea and vomiting.   Endocrine: Negative.    Genitourinary:  Negative for dysuria and urgency.   Musculoskeletal:  Positive for back pain.   Skin: Negative.  Negative for rash.   Allergic/Immunologic: Negative for immunocompromised state.   Neurological:  Positive for numbness. Negative for dizziness, weakness, light-headedness and headaches.   Psychiatric/Behavioral: Negative.  Negative for agitation.        Objective   Physical Exam  Constitutional:       General: She is not in acute distress.  HENT:      Head: Normocephalic.      Nose: Nose normal.      Mouth/Throat:      Mouth: Mucous membranes are moist.   Eyes:      Conjunctiva/sclera: Conjunctivae normal.      Pupils: Pupils are equal, round, and reactive to light.   Cardiovascular:      Rate and Rhythm: Normal rate and regular rhythm.      Pulses: Normal pulses.      Heart sounds: Normal heart sounds.   Pulmonary:      Effort: No respiratory distress.      Breath sounds: No wheezing.   Chest:      Chest wall: No tenderness.   Abdominal:      General: Abdomen is flat. Bowel sounds are normal.      Palpations: Abdomen is soft.      Tenderness: There is no abdominal tenderness.   Musculoskeletal:          General: Tenderness present. Normal range of motion.      Cervical back: Normal range of motion.   Lymphadenopathy:      Cervical: No cervical adenopathy.   Skin:     General: Skin is warm and dry.      Findings: No rash.   Neurological:      General: No focal deficit present.      Mental Status: She is alert. Mental status is at baseline.   Psychiatric:         Mood and Affect: Mood normal.         Behavior: Behavior normal.         Assessment/Plan   1. Visit for pre-operative examination  ECG 12 Lead      2. Hyperlipidemia, unspecified hyperlipidemia type  Lipid Panel      3. Benign essential HTN  Comprehensive Metabolic Panel      4. Impaired fasting glucose  Comprehensive Metabolic Panel    Hemoglobin A1C      5. Numbness and tingling of both feet        6. Chronic midline low back pain with bilateral sciatica          TEST RESULTS WERE DISCUSSED.  PT IS IN STABLE MEDICAL CONDITION AND THERE IS NO CONTRAINDICATIONS AT THIS STAGE FOR THE PLANED SURGERY .  MONITOR BP   GOAL BP LOWER THAN 130/80  LOW SALT  EXERCISE DAILY.ADVISED FOR FALL PRECAUTION.    MDM    1) COMPLEXITY: MORE THAN 1 STABLE CHRONIC CONDITION ADDRESSED  2)DATA: TESTS INTERPRETED AND OR ORDERED, TOOK INDEPENDENT HISTORY OR RECORDS REVIEWED  3)RISK: MODERATE RISK DUE TO NATURE OF MEDICAL CONDITIONS/COMORBIDITY OR MEDICATIONS ORDERED OR SURGICAL OR PROCEDURE REFERRAL, .         3 mon

## 2024-04-07 ENCOUNTER — HOSPITAL ENCOUNTER (EMERGENCY)
Facility: HOSPITAL | Age: 69
Discharge: HOME | End: 2024-04-07
Attending: EMERGENCY MEDICINE
Payer: MEDICARE

## 2024-04-07 ENCOUNTER — APPOINTMENT (OUTPATIENT)
Dept: RADIOLOGY | Facility: HOSPITAL | Age: 69
End: 2024-04-07
Payer: MEDICARE

## 2024-04-07 ENCOUNTER — HOSPITAL ENCOUNTER (OUTPATIENT)
Dept: CARDIOLOGY | Facility: HOSPITAL | Age: 69
Discharge: HOME | End: 2024-04-07
Payer: MEDICARE

## 2024-04-07 VITALS
OXYGEN SATURATION: 97 % | HEIGHT: 64 IN | RESPIRATION RATE: 18 BRPM | TEMPERATURE: 98 F | DIASTOLIC BLOOD PRESSURE: 76 MMHG | HEART RATE: 60 BPM | SYSTOLIC BLOOD PRESSURE: 134 MMHG | WEIGHT: 160 LBS | BODY MASS INDEX: 27.31 KG/M2

## 2024-04-07 DIAGNOSIS — M54.6 ACUTE RIGHT-SIDED THORACIC BACK PAIN: Primary | ICD-10-CM

## 2024-04-07 LAB
ANION GAP SERPL CALC-SCNC: 10 MMOL/L (ref 10–20)
BASOPHILS # BLD AUTO: 0.05 X10*3/UL (ref 0–0.1)
BASOPHILS NFR BLD AUTO: 0.7 %
BUN SERPL-MCNC: 14 MG/DL (ref 6–23)
CALCIUM SERPL-MCNC: 8.9 MG/DL (ref 8.6–10.3)
CARDIAC TROPONIN I PNL SERPL HS: 4 NG/L (ref 0–13)
CARDIAC TROPONIN I PNL SERPL HS: 4 NG/L (ref 0–13)
CHLORIDE SERPL-SCNC: 100 MMOL/L (ref 98–107)
CO2 SERPL-SCNC: 30 MMOL/L (ref 21–32)
CREAT SERPL-MCNC: 0.62 MG/DL (ref 0.5–1.05)
EGFRCR SERPLBLD CKD-EPI 2021: >90 ML/MIN/1.73M*2
EOSINOPHIL # BLD AUTO: 0.23 X10*3/UL (ref 0–0.7)
EOSINOPHIL NFR BLD AUTO: 3.4 %
ERYTHROCYTE [DISTWIDTH] IN BLOOD BY AUTOMATED COUNT: 13.3 % (ref 11.5–14.5)
GLUCOSE SERPL-MCNC: 105 MG/DL (ref 74–99)
HCT VFR BLD AUTO: 40.2 % (ref 36–46)
HGB BLD-MCNC: 12.9 G/DL (ref 12–16)
IMM GRANULOCYTES # BLD AUTO: 0.06 X10*3/UL (ref 0–0.7)
IMM GRANULOCYTES NFR BLD AUTO: 0.9 % (ref 0–0.9)
LYMPHOCYTES # BLD AUTO: 1.65 X10*3/UL (ref 1.2–4.8)
LYMPHOCYTES NFR BLD AUTO: 24.7 %
MCH RBC QN AUTO: 28.7 PG (ref 26–34)
MCHC RBC AUTO-ENTMCNC: 32.1 G/DL (ref 32–36)
MCV RBC AUTO: 89 FL (ref 80–100)
MONOCYTES # BLD AUTO: 0.57 X10*3/UL (ref 0.1–1)
MONOCYTES NFR BLD AUTO: 8.5 %
NEUTROPHILS # BLD AUTO: 4.13 X10*3/UL (ref 1.2–7.7)
NEUTROPHILS NFR BLD AUTO: 61.8 %
NRBC BLD-RTO: 0 /100 WBCS (ref 0–0)
PLATELET # BLD AUTO: 239 X10*3/UL (ref 150–450)
POTASSIUM SERPL-SCNC: 3.9 MMOL/L (ref 3.5–5.3)
RBC # BLD AUTO: 4.5 X10*6/UL (ref 4–5.2)
SODIUM SERPL-SCNC: 136 MMOL/L (ref 136–145)
WBC # BLD AUTO: 6.7 X10*3/UL (ref 4.4–11.3)

## 2024-04-07 PROCEDURE — 96375 TX/PRO/DX INJ NEW DRUG ADDON: CPT

## 2024-04-07 PROCEDURE — 36415 COLL VENOUS BLD VENIPUNCTURE: CPT | Performed by: EMERGENCY MEDICINE

## 2024-04-07 PROCEDURE — 84484 ASSAY OF TROPONIN QUANT: CPT | Performed by: EMERGENCY MEDICINE

## 2024-04-07 PROCEDURE — 71275 CT ANGIOGRAPHY CHEST: CPT

## 2024-04-07 PROCEDURE — 80048 BASIC METABOLIC PNL TOTAL CA: CPT | Performed by: EMERGENCY MEDICINE

## 2024-04-07 PROCEDURE — 96374 THER/PROPH/DIAG INJ IV PUSH: CPT | Mod: 59

## 2024-04-07 PROCEDURE — 2500000004 HC RX 250 GENERAL PHARMACY W/ HCPCS (ALT 636 FOR OP/ED): Performed by: EMERGENCY MEDICINE

## 2024-04-07 PROCEDURE — 99285 EMERGENCY DEPT VISIT HI MDM: CPT | Mod: 25

## 2024-04-07 PROCEDURE — 71275 CT ANGIOGRAPHY CHEST: CPT | Performed by: RADIOLOGY

## 2024-04-07 PROCEDURE — 85025 COMPLETE CBC W/AUTO DIFF WBC: CPT | Performed by: EMERGENCY MEDICINE

## 2024-04-07 PROCEDURE — 93005 ELECTROCARDIOGRAM TRACING: CPT

## 2024-04-07 PROCEDURE — 2550000001 HC RX 255 CONTRASTS: Performed by: EMERGENCY MEDICINE

## 2024-04-07 RX ORDER — OXYCODONE HYDROCHLORIDE 5 MG/1
5 TABLET ORAL EVERY 6 HOURS PRN
COMMUNITY

## 2024-04-07 RX ORDER — HYDROMORPHONE HYDROCHLORIDE 1 MG/ML
1 INJECTION, SOLUTION INTRAMUSCULAR; INTRAVENOUS; SUBCUTANEOUS ONCE
Status: COMPLETED | OUTPATIENT
Start: 2024-04-07 | End: 2024-04-07

## 2024-04-07 RX ORDER — ONDANSETRON HYDROCHLORIDE 2 MG/ML
4 INJECTION, SOLUTION INTRAVENOUS ONCE
Status: COMPLETED | OUTPATIENT
Start: 2024-04-07 | End: 2024-04-07

## 2024-04-07 RX ADMIN — IOHEXOL 69 ML: 350 INJECTION, SOLUTION INTRAVENOUS at 10:55

## 2024-04-07 RX ADMIN — HYDROMORPHONE HYDROCHLORIDE 1 MG: 1 INJECTION, SOLUTION INTRAMUSCULAR; INTRAVENOUS; SUBCUTANEOUS at 09:43

## 2024-04-07 RX ADMIN — ONDANSETRON 4 MG: 2 INJECTION INTRAMUSCULAR; INTRAVENOUS at 09:43

## 2024-04-07 ASSESSMENT — PAIN DESCRIPTION - ONSET
ONSET: AWAKENED FROM SLEEP
ONSET: ONGOING

## 2024-04-07 ASSESSMENT — PAIN DESCRIPTION - DESCRIPTORS
DESCRIPTORS: PATIENT UNABLE TO DESCRIBE
DESCRIPTORS: PATIENT UNABLE TO DESCRIBE

## 2024-04-07 ASSESSMENT — PAIN SCALES - GENERAL
PAINLEVEL_OUTOF10: 10 - WORST POSSIBLE PAIN
PAINLEVEL_OUTOF10: 3

## 2024-04-07 ASSESSMENT — PAIN DESCRIPTION - PAIN TYPE
TYPE: ACUTE PAIN
TYPE: ACUTE PAIN

## 2024-04-07 ASSESSMENT — PAIN - FUNCTIONAL ASSESSMENT
PAIN_FUNCTIONAL_ASSESSMENT: 0-10
PAIN_FUNCTIONAL_ASSESSMENT: 0-10

## 2024-04-07 ASSESSMENT — COLUMBIA-SUICIDE SEVERITY RATING SCALE - C-SSRS
2. HAVE YOU ACTUALLY HAD ANY THOUGHTS OF KILLING YOURSELF?: NO
6. HAVE YOU EVER DONE ANYTHING, STARTED TO DO ANYTHING, OR PREPARED TO DO ANYTHING TO END YOUR LIFE?: NO
1. IN THE PAST MONTH, HAVE YOU WISHED YOU WERE DEAD OR WISHED YOU COULD GO TO SLEEP AND NOT WAKE UP?: NO

## 2024-04-07 ASSESSMENT — PAIN DESCRIPTION - LOCATION
LOCATION: BACK
LOCATION: BACK

## 2024-04-07 ASSESSMENT — PAIN DESCRIPTION - FREQUENCY: FREQUENCY: CONSTANT/CONTINUOUS

## 2024-04-07 ASSESSMENT — PAIN DESCRIPTION - ORIENTATION
ORIENTATION: RIGHT;UPPER
ORIENTATION: RIGHT

## 2024-04-07 NOTE — ED PROVIDER NOTES
HPI   Chief Complaint   Patient presents with    Back Pain     Pt comes in for upper back pain. Pt states that on 4/3 she had some back surgery due to a fall. Pt states that the pain was bad after the surgery, but starting yesterday she had worsening pain. Pt states taht the pain radiates to the right side of her chest. She called her on call provider and he instructed her to come to the ER to be evaluated for blood clots.        Patient presents to the emergency department as directed by her orthopedic physician.  The patient had lumbar surgery this past week which apparently was a 2-1/2-hour procedure with both an anterior and posterior approach.  This was performed at the [facility].  The patient states that she is now having pain to the right upper thoracic region.  She states that this has been persistent over several days and not letting up with oxycodone at home.  She spoke to her orthopedic physician on-call today who instructed her to report to the emergency room to have a blood clot ruled out.      History provided by:  Patient   used: No                        No data recorded                   Patient History   Past Medical History:   Diagnosis Date    Carpal tunnel syndrome, bilateral upper limbs     Bilateral carpal tunnel syndrome 03/21/2018    Colitis     Gastro-esophageal reflux disease with esophagitis, without bleeding     Esophagitis, reflux 12/04/2019    Hypertension     Personal history of malignant neoplasm of other organs and systems     History of squamous cell carcinoma    Personal history of other endocrine, nutritional and metabolic disease 12/02/2019    History of hypoglycemia    Personal history of other medical treatment 11/25/2019    History of mammogram    Personal history of other medical treatment     H/O bone density study    Personal history of other specified conditions     History of dysphagia 01/13/2020     Past Surgical History:   Procedure Laterality  Date    BLADDER SURGERY  2020    Bladder Surgery    CHOLECYSTECTOMY  2023    DR. ASHLEIGH MCKEON    OTHER SURGICAL HISTORY Left 2018    Peripheral Nerve Block Wrist Median Left 2018    OTHER SURGICAL HISTORY Right 2018    Peripheral Nerve Block Wrist Median Right 2018    OTHER SURGICAL HISTORY      Dilation and curettage 2020    OTHER SURGICAL HISTORY  2020    Trigger finger repair 2020    OTHER SURGICAL HISTORY  2022    Esophagogastroduodenoscopy 2022    OTHER SURGICAL HISTORY  2019    Placement of esophageal sphincter augmentation magnetic device    OTHER SURGICAL HISTORY  2020    Gastrectomy Sleeve Laparoscopic    OTHER SURGICAL HISTORY  2020    Appendicostomy    OTHER SURGICAL HISTORY  2020    Carpal tunnel surgery    OTHER SURGICAL HISTORY  2020    Excision of squamous cell carcinoma    OTHER SURGICAL HISTORY  2019    Diaphragmatic hernia repair    OTHER SURGICAL HISTORY  2022    Colonoscopy     Family History   Problem Relation Name Age of Onset    Other (CARDIAC DISORDER) Mother      Diabetes Father      Hypertension Father      Lung cancer Father      Other (MALIGNANT NEOPLASM OF PROSTATE) Father      Hypothyroidism Sister      Other (MALIGNANT NEOPLASM OF STOMACH) Other MAT GREAT GRANDMOTHER     Breast cancer Father's Sister      Breast cancer Mother's Sister       Social History     Tobacco Use    Smoking status: Former     Types: Cigarettes     Quit date:      Years since quittin.2    Smokeless tobacco: Never   Vaping Use    Vaping Use: Never used   Substance Use Topics    Alcohol use: Yes     Comment: SOCIALLY    Drug use: Never       Physical Exam   ED Triage Vitals [24 0846]   Temperature Heart Rate Respirations BP   36.7 °C (98 °F) 70 18 142/84      Pulse Ox Temp Source Heart Rate Source Patient Position   99 % Temporal Monitor Sitting      BP Location FiO2 (%)     Left arm --        Physical Exam  Vitals and nursing note reviewed.   Constitutional:       General: She is not in acute distress.     Appearance: Normal appearance. She is normal weight. She is not ill-appearing, toxic-appearing or diaphoretic.      Comments: Patient is sitting up in bed smiling.  She is resting comfortably in bed.  She is able to find a position of comfort.  She is not dyspneic or diaphoretic.  She is pleasant and appears quite well.   HENT:      Head: Normocephalic and atraumatic.      Nose: Nose normal. No rhinorrhea.   Neck:      Comments: Trachea is midline  Cardiovascular:      Rate and Rhythm: Normal rate and regular rhythm.      Heart sounds: No murmur heard.  Pulmonary:      Effort: Pulmonary effort is normal.      Breath sounds: Normal breath sounds. No wheezing.   Abdominal:      General: Abdomen is flat. Bowel sounds are normal. There is no distension.      Palpations: Abdomen is soft.      Tenderness: There is no abdominal tenderness.   Musculoskeletal:         General: Tenderness present. Normal range of motion.      Cervical back: Normal range of motion.      Comments: Patient has easily reproducible tenderness to palpation over the medial aspect of the right scapula.  This is over the lateral to the midline spinous processes.   Skin:     General: Skin is warm and dry.      Findings: No rash.      Comments: Patient's postoperative sites, both over the anterior/inferior abdomen as well as over the posterior lumbar spine, are all unremarkable.  There is no evidence of secondary infection or dehiscence.  Steri-Strips are intact.  Additionally, there is no zoster type rash is identified anywhere over the patient's torso.   Neurological:      General: No focal deficit present.      Mental Status: She is alert and oriented to person, place, and time. Mental status is at baseline.   Psychiatric:         Mood and Affect: Mood normal.         Behavior: Behavior normal.         Thought Content: Thought  content normal.         Judgment: Judgment normal.         ED Course & MDM   ED Course as of 04/07/24 1207   Sun Apr 07, 2024   1159 CT angio chest for pulmonary embolism [SM]      ED Course User Index  [SM] Florian Jones DO         Diagnoses as of 04/07/24 1207   Acute right-sided thoracic back pain       Medical Decision Making  Twelve-lead EKG was interpreted by myself and this was noted to contribute directly to patient care.  Studies noted reveal normal sinus rhythm at 64 bpm, normal axis, normal R wave progression, no acute ischemic changes, no S1 Q3 T3 is identified.    Patient was medicated for pain with good improvement of her symptoms.  I gave the patient a copy of her CAT scan report and went over the incidental findings of atelectasis, lower esophageal wall thickening, and a stable nodule.  She is aware of the esophageal changes from prior surgery and she understands the importance of continued follow-up about the nodule.    She will be discharged home.  Rest and limit activity as tolerated.  Apply ice to the area and she already has a prescription for pain medications and no further prescriptions were furnished.  Follow-up with her established physician and return if worse.        Procedure  Procedures     Florian Jones DO  04/07/24 1211

## 2024-04-08 LAB
ATRIAL RATE: 64 BPM
P AXIS: 45 DEGREES
P OFFSET: 204 MS
P ONSET: 151 MS
PR INTERVAL: 142 MS
Q ONSET: 222 MS
QRS COUNT: 11 BEATS
QRS DURATION: 78 MS
QT INTERVAL: 426 MS
QTC CALCULATION(BAZETT): 439 MS
QTC FREDERICIA: 435 MS
R AXIS: 50 DEGREES
T AXIS: 23 DEGREES
T OFFSET: 435 MS
VENTRICULAR RATE: 64 BPM

## 2024-04-11 ENCOUNTER — APPOINTMENT (OUTPATIENT)
Dept: PRIMARY CARE | Facility: CLINIC | Age: 69
End: 2024-04-11
Payer: MEDICARE

## 2024-04-18 ENCOUNTER — APPOINTMENT (OUTPATIENT)
Dept: PRIMARY CARE | Facility: CLINIC | Age: 69
End: 2024-04-18
Payer: MEDICARE

## 2024-06-27 ENCOUNTER — APPOINTMENT (OUTPATIENT)
Dept: PRIMARY CARE | Facility: CLINIC | Age: 69
End: 2024-06-27
Payer: MEDICARE

## 2024-06-27 VITALS
HEIGHT: 64 IN | HEART RATE: 67 BPM | WEIGHT: 159 LBS | DIASTOLIC BLOOD PRESSURE: 76 MMHG | OXYGEN SATURATION: 98 % | BODY MASS INDEX: 27.14 KG/M2 | SYSTOLIC BLOOD PRESSURE: 132 MMHG

## 2024-06-27 DIAGNOSIS — I10 BENIGN ESSENTIAL HTN: Primary | ICD-10-CM

## 2024-06-27 DIAGNOSIS — E78.5 HYPERLIPIDEMIA, UNSPECIFIED HYPERLIPIDEMIA TYPE: ICD-10-CM

## 2024-06-27 DIAGNOSIS — Z98.890 H/O LUMBOSACRAL SPINE SURGERY: ICD-10-CM

## 2024-06-27 DIAGNOSIS — D64.9 ANEMIA, UNSPECIFIED TYPE: ICD-10-CM

## 2024-06-27 DIAGNOSIS — R73.01 IMPAIRED FASTING GLUCOSE: ICD-10-CM

## 2024-06-27 PROBLEM — M79.672 PAIN IN BOTH FEET: Status: RESOLVED | Noted: 2023-02-06 | Resolved: 2024-06-27

## 2024-06-27 PROBLEM — M25.641 STIFFNESS OF FINGER JOINT OF RIGHT HAND: Status: RESOLVED | Noted: 2023-02-06 | Resolved: 2024-06-27

## 2024-06-27 PROBLEM — M79.671 PAIN IN BOTH FEET: Status: RESOLVED | Noted: 2023-02-06 | Resolved: 2024-06-27

## 2024-06-27 PROCEDURE — 1036F TOBACCO NON-USER: CPT | Performed by: INTERNAL MEDICINE

## 2024-06-27 PROCEDURE — 1159F MED LIST DOCD IN RCRD: CPT | Performed by: INTERNAL MEDICINE

## 2024-06-27 PROCEDURE — 99214 OFFICE O/P EST MOD 30 MIN: CPT | Performed by: INTERNAL MEDICINE

## 2024-06-27 PROCEDURE — 1160F RVW MEDS BY RX/DR IN RCRD: CPT | Performed by: INTERNAL MEDICINE

## 2024-06-27 PROCEDURE — 3075F SYST BP GE 130 - 139MM HG: CPT | Performed by: INTERNAL MEDICINE

## 2024-06-27 PROCEDURE — 3078F DIAST BP <80 MM HG: CPT | Performed by: INTERNAL MEDICINE

## 2024-06-27 ASSESSMENT — ENCOUNTER SYMPTOMS
AGITATION: 0
RHINORRHEA: 0
PSYCHIATRIC NEGATIVE: 1
CONSTIPATION: 0
LIGHT-HEADEDNESS: 0
ABDOMINAL PAIN: 0
DIZZINESS: 0
MUSCULOSKELETAL NEGATIVE: 1
ENDOCRINE NEGATIVE: 1
WHEEZING: 0
GASTROINTESTINAL NEGATIVE: 1
DYSURIA: 0
WEAKNESS: 0
BACK PAIN: 0
FEVER: 0
CHILLS: 0
EYES NEGATIVE: 1
NEUROLOGICAL NEGATIVE: 1
NAUSEA: 0
VOMITING: 0
HEADACHES: 0
PALPITATIONS: 0
SHORTNESS OF BREATH: 0
ABDOMINAL DISTENTION: 0
CARDIOVASCULAR NEGATIVE: 1
DIARRHEA: 0
COUGH: 0

## 2024-06-27 NOTE — PROGRESS NOTES
Subjective   Patient ID: Yolette Morrow is a 68 y.o. female who presents for 3 month follow up  (Review labs ).  HPI  Lab F/U (WAS DONE 2 MONTHS AGO). S/P SPINAL SURGERY WHICH HELPED WITH CHRONIC LOW BACK PAIN AND CHRONIC DIARRHEA.  Review of Systems   Constitutional:  Negative for chills and fever.   HENT: Negative.  Negative for congestion, postnasal drip and rhinorrhea.    Eyes: Negative.  Negative for visual disturbance.   Respiratory:  Negative for cough, shortness of breath and wheezing.    Cardiovascular: Negative.  Negative for chest pain, palpitations and leg swelling.   Gastrointestinal: Negative.  Negative for abdominal distention, abdominal pain, constipation, diarrhea, nausea and vomiting.   Endocrine: Negative.    Genitourinary:  Negative for dysuria and urgency.   Musculoskeletal: Negative.  Negative for back pain.   Skin: Negative.  Negative for rash.   Allergic/Immunologic: Negative for immunocompromised state.   Neurological: Negative.  Negative for dizziness, weakness, light-headedness and headaches.   Psychiatric/Behavioral: Negative.  Negative for agitation.        Objective   Physical Exam  Constitutional:       General: She is not in acute distress.  HENT:      Head: Normocephalic.      Nose: Nose normal.      Mouth/Throat:      Mouth: Mucous membranes are moist.   Eyes:      Conjunctiva/sclera: Conjunctivae normal.      Pupils: Pupils are equal, round, and reactive to light.   Cardiovascular:      Rate and Rhythm: Normal rate and regular rhythm.      Pulses: Normal pulses.      Heart sounds: Normal heart sounds.   Pulmonary:      Effort: No respiratory distress.      Breath sounds: No wheezing.   Chest:      Chest wall: No tenderness.   Abdominal:      General: Abdomen is flat. Bowel sounds are normal.      Palpations: Abdomen is soft.      Tenderness: There is no abdominal tenderness.   Musculoskeletal:         General: No tenderness. Normal range of motion.      Cervical back: Normal  range of motion.   Lymphadenopathy:      Cervical: No cervical adenopathy.   Skin:     General: Skin is warm and dry.      Findings: No rash.   Neurological:      General: No focal deficit present.      Mental Status: She is alert. Mental status is at baseline.   Psychiatric:         Mood and Affect: Mood normal.         Behavior: Behavior normal.         Assessment/Plan   1. Benign essential HTN  Comprehensive Metabolic Panel      2. Impaired fasting glucose  Comprehensive Metabolic Panel    Hemoglobin A1C      3. Hyperlipidemia, unspecified hyperlipidemia type  Lipid Panel      4. Anemia, unspecified type  CBC and Auto Differential    Vitamin B12    Methylmalonic Acid    Folate    Iron and TIBC    Ferritin      5. H/O lumbosacral spine surgery        TEST RESULTS WERE DISCUSSED.  ADVISED TO HAVE LOW FAT AND LOW CALORIE DIET AND TO LOOSE WEIGHT, DAILY EXERCISE.    HTN addressed as follow:    MONITOR BP   GOAL BP LOWER THAN 130/80  LOW SALT  EXERCISE DAILY, FALL PRECAUTION.    MDM    1) COMPLEXITY: MORE THAN 1 STABLE CHRONIC CONDITION ADDRESSED  2)DATA: TESTS INTERPRETED AND OR ORDERED, TOOK INDEPENDENT HISTORY OR RECORDS REVIEWED  3)RISK: MODERATE RISK DUE TO NATURE OF MEDICAL CONDITIONS/COMORBIDITY OR MEDICATIONS ORDERED OR SURGICAL OR PROCEDURE REFERRAL, .         1 mon

## 2024-07-09 ENCOUNTER — APPOINTMENT (OUTPATIENT)
Dept: PRIMARY CARE | Facility: CLINIC | Age: 69
End: 2024-07-09
Payer: MEDICARE

## 2024-07-10 ENCOUNTER — APPOINTMENT (OUTPATIENT)
Dept: PRIMARY CARE | Facility: CLINIC | Age: 69
End: 2024-07-10
Payer: MEDICARE

## 2024-07-18 ENCOUNTER — LAB (OUTPATIENT)
Dept: LAB | Facility: LAB | Age: 69
End: 2024-07-18
Payer: MEDICARE

## 2024-07-18 DIAGNOSIS — R73.01 IMPAIRED FASTING GLUCOSE: ICD-10-CM

## 2024-07-18 DIAGNOSIS — I10 BENIGN ESSENTIAL HTN: ICD-10-CM

## 2024-07-18 DIAGNOSIS — D64.9 ANEMIA, UNSPECIFIED TYPE: ICD-10-CM

## 2024-07-18 DIAGNOSIS — E78.5 HYPERLIPIDEMIA, UNSPECIFIED HYPERLIPIDEMIA TYPE: ICD-10-CM

## 2024-07-18 LAB
ALBUMIN SERPL BCP-MCNC: 4.3 G/DL (ref 3.4–5)
ALP SERPL-CCNC: 78 U/L (ref 33–136)
ALT SERPL W P-5'-P-CCNC: 18 U/L (ref 7–45)
ANION GAP SERPL CALC-SCNC: 10 MMOL/L (ref 10–20)
AST SERPL W P-5'-P-CCNC: 22 U/L (ref 9–39)
BASOPHILS # BLD AUTO: 0.05 X10*3/UL (ref 0–0.1)
BASOPHILS NFR BLD AUTO: 1.2 %
BILIRUB SERPL-MCNC: 0.4 MG/DL (ref 0–1.2)
BUN SERPL-MCNC: 11 MG/DL (ref 6–23)
CALCIUM SERPL-MCNC: 9 MG/DL (ref 8.6–10.3)
CHLORIDE SERPL-SCNC: 105 MMOL/L (ref 98–107)
CHOLEST SERPL-MCNC: 165 MG/DL (ref 0–199)
CHOLESTEROL/HDL RATIO: 3.4
CO2 SERPL-SCNC: 27 MMOL/L (ref 21–32)
CREAT SERPL-MCNC: 0.66 MG/DL (ref 0.5–1.05)
EGFRCR SERPLBLD CKD-EPI 2021: >90 ML/MIN/1.73M*2
EOSINOPHIL # BLD AUTO: 0.17 X10*3/UL (ref 0–0.7)
EOSINOPHIL NFR BLD AUTO: 4.2 %
ERYTHROCYTE [DISTWIDTH] IN BLOOD BY AUTOMATED COUNT: 13 % (ref 11.5–14.5)
EST. AVERAGE GLUCOSE BLD GHB EST-MCNC: 117 MG/DL
FERRITIN SERPL-MCNC: 23 NG/ML (ref 8–150)
FOLATE SERPL-MCNC: >22.3 NG/ML
GLUCOSE SERPL-MCNC: 90 MG/DL (ref 74–99)
HBA1C MFR BLD: 5.7 %
HCT VFR BLD AUTO: 40.1 % (ref 36–46)
HDLC SERPL-MCNC: 48 MG/DL
HGB BLD-MCNC: 12.7 G/DL (ref 12–16)
IMM GRANULOCYTES # BLD AUTO: 0.01 X10*3/UL (ref 0–0.7)
IMM GRANULOCYTES NFR BLD AUTO: 0.2 % (ref 0–0.9)
IRON SATN MFR SERPL: 15 % (ref 25–45)
IRON SERPL-MCNC: 57 UG/DL (ref 35–150)
LDLC SERPL CALC-MCNC: 84 MG/DL
LYMPHOCYTES # BLD AUTO: 1.86 X10*3/UL (ref 1.2–4.8)
LYMPHOCYTES NFR BLD AUTO: 45.5 %
MCH RBC QN AUTO: 28 PG (ref 26–34)
MCHC RBC AUTO-ENTMCNC: 31.7 G/DL (ref 32–36)
MCV RBC AUTO: 88 FL (ref 80–100)
MONOCYTES # BLD AUTO: 0.46 X10*3/UL (ref 0.1–1)
MONOCYTES NFR BLD AUTO: 11.2 %
NEUTROPHILS # BLD AUTO: 1.54 X10*3/UL (ref 1.2–7.7)
NEUTROPHILS NFR BLD AUTO: 37.7 %
NON HDL CHOLESTEROL: 117 MG/DL (ref 0–149)
NRBC BLD-RTO: 0 /100 WBCS (ref 0–0)
PLATELET # BLD AUTO: 264 X10*3/UL (ref 150–450)
POTASSIUM SERPL-SCNC: 4 MMOL/L (ref 3.5–5.3)
PROT SERPL-MCNC: 6.3 G/DL (ref 6.4–8.2)
RBC # BLD AUTO: 4.54 X10*6/UL (ref 4–5.2)
SODIUM SERPL-SCNC: 138 MMOL/L (ref 136–145)
TIBC SERPL-MCNC: 385 UG/DL (ref 240–445)
TRIGL SERPL-MCNC: 163 MG/DL (ref 0–149)
UIBC SERPL-MCNC: 328 UG/DL (ref 110–370)
VIT B12 SERPL-MCNC: 2894 PG/ML (ref 211–911)
VLDL: 33 MG/DL (ref 0–40)
WBC # BLD AUTO: 4.1 X10*3/UL (ref 4.4–11.3)

## 2024-07-18 PROCEDURE — 83540 ASSAY OF IRON: CPT

## 2024-07-18 PROCEDURE — 83036 HEMOGLOBIN GLYCOSYLATED A1C: CPT

## 2024-07-18 PROCEDURE — 83550 IRON BINDING TEST: CPT

## 2024-07-18 PROCEDURE — 85025 COMPLETE CBC W/AUTO DIFF WBC: CPT

## 2024-07-18 PROCEDURE — 80061 LIPID PANEL: CPT

## 2024-07-18 PROCEDURE — 82728 ASSAY OF FERRITIN: CPT

## 2024-07-18 PROCEDURE — 83921 ORGANIC ACID SINGLE QUANT: CPT

## 2024-07-18 PROCEDURE — 80053 COMPREHEN METABOLIC PANEL: CPT

## 2024-07-18 PROCEDURE — 36415 COLL VENOUS BLD VENIPUNCTURE: CPT

## 2024-07-18 PROCEDURE — 82746 ASSAY OF FOLIC ACID SERUM: CPT

## 2024-07-18 PROCEDURE — 82607 VITAMIN B-12: CPT

## 2024-07-23 ENCOUNTER — OFFICE VISIT (OUTPATIENT)
Dept: PRIMARY CARE | Facility: CLINIC | Age: 69
End: 2024-07-23
Payer: MEDICARE

## 2024-07-23 VITALS
HEIGHT: 64 IN | DIASTOLIC BLOOD PRESSURE: 85 MMHG | BODY MASS INDEX: 26.46 KG/M2 | HEART RATE: 65 BPM | SYSTOLIC BLOOD PRESSURE: 133 MMHG | WEIGHT: 155 LBS

## 2024-07-23 DIAGNOSIS — J45.20 MILD INTERMITTENT ASTHMA WITHOUT COMPLICATION (HHS-HCC): ICD-10-CM

## 2024-07-23 DIAGNOSIS — R05.3 CHRONIC COUGH: Primary | ICD-10-CM

## 2024-07-23 DIAGNOSIS — R74.8 ELEVATED VITAMIN B12 LEVEL: ICD-10-CM

## 2024-07-23 DIAGNOSIS — I10 BENIGN ESSENTIAL HTN: ICD-10-CM

## 2024-07-23 DIAGNOSIS — E77.8 HYPOPROTEINEMIA (MULTI): ICD-10-CM

## 2024-07-23 DIAGNOSIS — R73.01 IMPAIRED FASTING GLUCOSE: ICD-10-CM

## 2024-07-23 PROBLEM — R79.89 ELEVATED VITAMIN B12 LEVEL: Status: ACTIVE | Noted: 2024-07-23

## 2024-07-23 PROCEDURE — 1036F TOBACCO NON-USER: CPT | Performed by: INTERNAL MEDICINE

## 2024-07-23 PROCEDURE — 3008F BODY MASS INDEX DOCD: CPT | Performed by: INTERNAL MEDICINE

## 2024-07-23 PROCEDURE — 1159F MED LIST DOCD IN RCRD: CPT | Performed by: INTERNAL MEDICINE

## 2024-07-23 PROCEDURE — 3075F SYST BP GE 130 - 139MM HG: CPT | Performed by: INTERNAL MEDICINE

## 2024-07-23 PROCEDURE — 3079F DIAST BP 80-89 MM HG: CPT | Performed by: INTERNAL MEDICINE

## 2024-07-23 PROCEDURE — 99214 OFFICE O/P EST MOD 30 MIN: CPT | Performed by: INTERNAL MEDICINE

## 2024-07-23 PROCEDURE — 1160F RVW MEDS BY RX/DR IN RCRD: CPT | Performed by: INTERNAL MEDICINE

## 2024-07-23 RX ORDER — MONTELUKAST SODIUM 10 MG/1
10 TABLET ORAL NIGHTLY PRN
Qty: 30 TABLET | Refills: 5 | Status: SHIPPED | OUTPATIENT
Start: 2024-07-23 | End: 2025-01-19

## 2024-07-23 RX ORDER — ALBUTEROL SULFATE 90 UG/1
2 AEROSOL, METERED RESPIRATORY (INHALATION) 4 TIMES DAILY PRN
Qty: 8 G | Refills: 3 | Status: SHIPPED | OUTPATIENT
Start: 2024-07-23 | End: 2025-07-23

## 2024-07-23 ASSESSMENT — ENCOUNTER SYMPTOMS
DIZZINESS: 0
MUSCULOSKELETAL NEGATIVE: 1
LIGHT-HEADEDNESS: 0
SHORTNESS OF BREATH: 0
COUGH: 1
DIARRHEA: 0
ABDOMINAL DISTENTION: 0
RHINORRHEA: 0
ABDOMINAL PAIN: 0
CHILLS: 0
CONSTIPATION: 0
VOMITING: 0
HEADACHES: 0
PALPITATIONS: 0
DYSURIA: 0
BACK PAIN: 0
FEVER: 0
EYES NEGATIVE: 1
GASTROINTESTINAL NEGATIVE: 1
CARDIOVASCULAR NEGATIVE: 1
ENDOCRINE NEGATIVE: 1
WEAKNESS: 0
AGITATION: 0
NEUROLOGICAL NEGATIVE: 1
WHEEZING: 0
PSYCHIATRIC NEGATIVE: 1
NAUSEA: 0

## 2024-07-23 NOTE — PROGRESS NOTES
Subjective   Patient ID: Yolette Morrow is a 69 y.o. female who presents for Follow-up (1 MO F/U WITH LABS. C/O COUGH X1 MO).  HPI  LAB F/U . CHRONIC DRY COUGH X 1 MONTH . HAD URGENT CARE VISIT ONE MOTH AGO FOR URI. FINISHED THE COURSE OF Z-PACK BUT STILL HAS THE COUGH. OTC ANTIHISTAMINE DIDN'T HELP.  Review of Systems   Constitutional:  Negative for chills and fever.   HENT: Negative.  Negative for congestion, postnasal drip and rhinorrhea.    Eyes: Negative.  Negative for visual disturbance.   Respiratory:  Positive for cough. Negative for shortness of breath and wheezing.    Cardiovascular: Negative.  Negative for chest pain, palpitations and leg swelling.   Gastrointestinal: Negative.  Negative for abdominal distention, abdominal pain, constipation, diarrhea, nausea and vomiting.   Endocrine: Negative.    Genitourinary:  Negative for dysuria and urgency.   Musculoskeletal: Negative.  Negative for back pain.   Skin: Negative.  Negative for rash.   Allergic/Immunologic: Negative for immunocompromised state.   Neurological: Negative.  Negative for dizziness, weakness, light-headedness and headaches.   Psychiatric/Behavioral: Negative.  Negative for agitation.        Objective   Physical Exam  Constitutional:       General: She is not in acute distress.  HENT:      Head: Normocephalic.      Nose: Nose normal.      Mouth/Throat:      Mouth: Mucous membranes are moist.   Eyes:      Conjunctiva/sclera: Conjunctivae normal.      Pupils: Pupils are equal, round, and reactive to light.   Cardiovascular:      Rate and Rhythm: Normal rate and regular rhythm.      Pulses: Normal pulses.      Heart sounds: Normal heart sounds.   Pulmonary:      Effort: No respiratory distress.      Breath sounds: No wheezing.   Chest:      Chest wall: No tenderness.   Abdominal:      General: Abdomen is flat. Bowel sounds are normal.      Palpations: Abdomen is soft.      Tenderness: There is no abdominal tenderness.   Musculoskeletal:          General: No tenderness. Normal range of motion.      Cervical back: Normal range of motion.   Lymphadenopathy:      Cervical: No cervical adenopathy.   Skin:     General: Skin is warm and dry.      Findings: No rash.   Neurological:      General: No focal deficit present.      Mental Status: She is alert. Mental status is at baseline.   Psychiatric:         Mood and Affect: Mood normal.         Behavior: Behavior normal.         Assessment/Plan   1. Chronic cough  montelukast (Singulair) 10 mg tablet    albuterol (Ventolin HFA) 90 mcg/actuation inhaler      2. Mild intermittent asthma without complication ([facility])  albuterol (Ventolin HFA) 90 mcg/actuation inhaler      3. Benign essential HTN        4. Impaired fasting glucose        5. Elevated vitamin B12 level        6. Hypoproteinemia (Multi)          TEST RESULTS WERE DISCUSSED.  ADVISED TO HAVE LOW FAT AND LOW CALORIE , HIGH PROTEIN DIET AND TO LOOSE WEIGHT, DAILY EXERCISE. ADVISED TO STOP THE OTC B12.  ADVISED TO ELEVATE THE HEAD OF THE BED FOR SLEEP AND TO SLEEP WITH WARM HUMIDIFIER.  EXPLAINED THE DIFFERENTIAL DX OF CHRONIC COUGH.    MDM    1) COMPLEXITY: 1 UNDIAGNOSED NEW PROBLEM WITH UNCERTAIN PROGNOSIS  2)DATA: TESTS INTERPRETED AND OR ORDERED, TOOK INDEPENDENT HISTORY OR RECORDS REVIEWED  3)RISK: MODERATE RISK DUE TO NATURE OF MEDICAL CONDITIONS/COMORBIDITY OR MEDICATIONS ORDERED OR SURGICAL OR PROCEDURE REFERRAL, .       3 weeks.

## 2024-07-24 LAB — METHYLMALONATE SERPL-SCNC: <0.1 UMOL/L (ref 0–0.4)

## 2024-07-25 ENCOUNTER — APPOINTMENT (OUTPATIENT)
Dept: PRIMARY CARE | Facility: CLINIC | Age: 69
End: 2024-07-25
Payer: MEDICARE

## 2024-08-13 ENCOUNTER — TELEPHONE (OUTPATIENT)
Dept: PRIMARY CARE | Facility: CLINIC | Age: 69
End: 2024-08-13

## 2024-08-13 ENCOUNTER — APPOINTMENT (OUTPATIENT)
Dept: PRIMARY CARE | Facility: CLINIC | Age: 69
End: 2024-08-13
Payer: MEDICARE

## 2024-08-13 VITALS
BODY MASS INDEX: 27.14 KG/M2 | WEIGHT: 159 LBS | SYSTOLIC BLOOD PRESSURE: 107 MMHG | HEART RATE: 60 BPM | DIASTOLIC BLOOD PRESSURE: 69 MMHG | HEIGHT: 64 IN

## 2024-08-13 DIAGNOSIS — R05.3 CHRONIC COUGH: ICD-10-CM

## 2024-08-13 DIAGNOSIS — D50.9 IRON DEFICIENCY ANEMIA, UNSPECIFIED IRON DEFICIENCY ANEMIA TYPE: ICD-10-CM

## 2024-08-13 DIAGNOSIS — K21.9 GASTROESOPHAGEAL REFLUX DISEASE WITHOUT ESOPHAGITIS: ICD-10-CM

## 2024-08-13 DIAGNOSIS — J45.20 MILD INTERMITTENT ASTHMA WITHOUT COMPLICATION (HHS-HCC): ICD-10-CM

## 2024-08-13 DIAGNOSIS — R73.01 IMPAIRED FASTING GLUCOSE: ICD-10-CM

## 2024-08-13 DIAGNOSIS — R53.83 FATIGUE, UNSPECIFIED TYPE: ICD-10-CM

## 2024-08-13 DIAGNOSIS — I10 BENIGN ESSENTIAL HTN: Primary | ICD-10-CM

## 2024-08-13 DIAGNOSIS — R74.8 ELEVATED VITAMIN B12 LEVEL: ICD-10-CM

## 2024-08-13 DIAGNOSIS — Z12.31 ENCOUNTER FOR SCREENING MAMMOGRAM FOR MALIGNANT NEOPLASM OF BREAST: ICD-10-CM

## 2024-08-13 PROCEDURE — 99214 OFFICE O/P EST MOD 30 MIN: CPT | Performed by: INTERNAL MEDICINE

## 2024-08-13 PROCEDURE — 3008F BODY MASS INDEX DOCD: CPT | Performed by: INTERNAL MEDICINE

## 2024-08-13 PROCEDURE — 1159F MED LIST DOCD IN RCRD: CPT | Performed by: INTERNAL MEDICINE

## 2024-08-13 PROCEDURE — 3074F SYST BP LT 130 MM HG: CPT | Performed by: INTERNAL MEDICINE

## 2024-08-13 PROCEDURE — 1160F RVW MEDS BY RX/DR IN RCRD: CPT | Performed by: INTERNAL MEDICINE

## 2024-08-13 PROCEDURE — 1036F TOBACCO NON-USER: CPT | Performed by: INTERNAL MEDICINE

## 2024-08-13 PROCEDURE — 3078F DIAST BP <80 MM HG: CPT | Performed by: INTERNAL MEDICINE

## 2024-08-13 ASSESSMENT — ENCOUNTER SYMPTOMS
GASTROINTESTINAL NEGATIVE: 1
WHEEZING: 0
CONSTIPATION: 0
LIGHT-HEADEDNESS: 0
FATIGUE: 1
NAUSEA: 0
AGITATION: 0
DIZZINESS: 0
FEVER: 0
CHILLS: 0
NEUROLOGICAL NEGATIVE: 1
PALPITATIONS: 0
DIARRHEA: 0
WEAKNESS: 0
BACK PAIN: 0
HEADACHES: 0
ABDOMINAL DISTENTION: 0
ENDOCRINE NEGATIVE: 1
EYES NEGATIVE: 1
MUSCULOSKELETAL NEGATIVE: 1
PSYCHIATRIC NEGATIVE: 1
ABDOMINAL PAIN: 0
VOMITING: 0
DYSURIA: 0
RHINORRHEA: 0
SHORTNESS OF BREATH: 0
CARDIOVASCULAR NEGATIVE: 1

## 2024-08-13 NOTE — TELEPHONE ENCOUNTER
I PLACED THE ORDER FOR SCREENING MAMMO AFTER SHE LEFT, BUT I DISCUSSED THE NEED FOR MAMMO AT THE VISIT.PLEASE SET IT UP.

## 2024-10-15 ENCOUNTER — TELEPHONE (OUTPATIENT)
Dept: PRIMARY CARE | Facility: CLINIC | Age: 69
End: 2024-10-15
Payer: MEDICARE

## 2024-10-17 ENCOUNTER — TELEPHONE (OUTPATIENT)
Dept: PRIMARY CARE | Facility: CLINIC | Age: 69
End: 2024-10-17
Payer: MEDICARE

## 2024-10-17 NOTE — TELEPHONE ENCOUNTER
Patient is asking if she can take 12 hour Mucinex with the Allegra and Montelukast?  She states she needs some additional help treating allergies.

## 2024-10-17 NOTE — PROGRESS NOTES
I saw and evaluated the patient. I personally obtained the key and critical portions of the history and physical exam or was physically present for key and critical portions performed by the resident/fellow. I reviewed the resident/fellow's documentation and discussed the patient with the resident/fellow. I agree with the resident/fellow's medical decision making as documented in the note.    Corinne A Bazella, MD       Subjective   Patient ID: Yolette Morrow is a 69 y.o. female who presents for Follow-up (3 WEEK F/U FOR A COUGH).  HPI  F/U ON ASTHMA AND CHRONIC COUGH. SINGULAIR HELPS , ASTHMA HAS BEEN STABLE. OMEPRAZOLE AND OTC ANTIHISTAMINE HELP COUGH TOO. FATIGUE (SINCE STOPPED TAKING THE OTC B12 WITH HAVING ELEVATED B12).  Review of Systems   Constitutional:  Positive for fatigue. Negative for chills and fever.   HENT: Negative.  Negative for congestion, postnasal drip and rhinorrhea.    Eyes: Negative.  Negative for visual disturbance.   Respiratory:  Negative for shortness of breath and wheezing.         CHRONIC COUGH PER HPI   Cardiovascular: Negative.  Negative for chest pain, palpitations and leg swelling.   Gastrointestinal: Negative.  Negative for abdominal distention, abdominal pain, constipation, diarrhea, nausea and vomiting.   Endocrine: Negative.    Genitourinary:  Negative for dysuria and urgency.   Musculoskeletal: Negative.  Negative for back pain.   Skin: Negative.  Negative for rash.   Allergic/Immunologic: Negative for immunocompromised state.   Neurological: Negative.  Negative for dizziness, weakness, light-headedness and headaches.   Psychiatric/Behavioral: Negative.  Negative for agitation.        Objective   Physical Exam  Constitutional:       General: She is not in acute distress.  HENT:      Head: Normocephalic.      Nose: Nose normal.      Mouth/Throat:      Mouth: Mucous membranes are moist.   Eyes:      Conjunctiva/sclera: Conjunctivae normal.      Pupils: Pupils are equal, round, and reactive to light.   Cardiovascular:      Rate and Rhythm: Normal rate and regular rhythm.      Pulses: Normal pulses.      Heart sounds: Normal heart sounds.   Pulmonary:      Effort: No respiratory distress.      Breath sounds: No wheezing.   Chest:      Chest wall: No tenderness.   Abdominal:      General: Abdomen is flat. Bowel sounds are normal.      Palpations: Abdomen is soft.      Tenderness: There is no  abdominal tenderness.   Musculoskeletal:         General: No tenderness. Normal range of motion.      Cervical back: Normal range of motion.   Lymphadenopathy:      Cervical: No cervical adenopathy.   Skin:     General: Skin is warm and dry.      Findings: No rash.   Neurological:      General: No focal deficit present.      Mental Status: She is alert. Mental status is at baseline.   Psychiatric:         Mood and Affect: Mood normal.         Behavior: Behavior normal.         Assessment/Plan   1. Benign essential HTN  Comprehensive Metabolic Panel      2. Iron deficiency anemia, unspecified iron deficiency anemia type  Iron and TIBC    Ferritin      3. Elevated vitamin B12 level  Vitamin B12      4. Fatigue, unspecified type  Comprehensive Metabolic Panel    TSH with reflex to Free T4 if abnormal    CBC and Auto Differential    Methylmalonic Acid    Folate    Iron and TIBC    Ferritin      5. Impaired fasting glucose  Hemoglobin A1C      6. Encounter for screening mammogram for malignant neoplasm of breast  BI mammo bilateral screening tomosynthesis      7. Chronic cough        8. Mild intermittent asthma without complication ([facility])        9. Gastroesophageal reflux disease without esophagitis          ADVISED TO CONTINUE THE ANTIHISTAMINE, SINGULAIR AND PPI FOR CHRONIC COUGH.  ADVISED TO Lose weight.  Do not overeat. Eat small portions at meals and snacks.  Avoid tight clothing and tight-fitting belts. Do not lie down or bend over within the first 15-30 minutes after eating.  Do not chew gum or suck on hard candy. Swallowing air with chewing gum and sucking on hard candy can cause belching and reflux.  Raise the head of your bed 6-8 inches.  Do not eat/drink: chocolate, tomatoes, tomato sauces, oranges, pineapple, grapefruit, mints, coffee, alcohol, carbonated beverages, and black pepper.  Eat a low fat diet. Fatty and greasy foods cause your stomach to produce more acid.    MDM    1) COMPLEXITY: MORE THAN 1  STABLE CHRONIC CONDITION ADDRESSED  2)DATA: TESTS INTERPRETED AND OR ORDERED, TOOK INDEPENDENT HISTORY OR RECORDS REVIEWED  3)RISK: MODERATE RISK DUE TO NATURE OF MEDICAL CONDITIONS/COMORBIDITY OR MEDICATIONS ORDERED OR SURGICAL OR PROCEDURE REFERRAL, .       6 mon

## 2024-10-29 ENCOUNTER — TELEPHONE (OUTPATIENT)
Dept: PRIMARY CARE | Facility: CLINIC | Age: 69
End: 2024-10-29

## 2024-10-29 ENCOUNTER — TELEMEDICINE (OUTPATIENT)
Dept: PRIMARY CARE | Facility: CLINIC | Age: 69
End: 2024-10-29
Payer: MEDICARE

## 2024-10-29 DIAGNOSIS — R05.2 SUBACUTE COUGH: ICD-10-CM

## 2024-10-29 DIAGNOSIS — J45.40 MODERATE PERSISTENT ASTHMA WITHOUT COMPLICATION (HHS-HCC): Primary | ICD-10-CM

## 2024-10-29 DIAGNOSIS — J01.90 ACUTE NON-RECURRENT SINUSITIS, UNSPECIFIED LOCATION: ICD-10-CM

## 2024-10-29 PROCEDURE — 99214 OFFICE O/P EST MOD 30 MIN: CPT | Performed by: INTERNAL MEDICINE

## 2024-10-29 PROCEDURE — 1036F TOBACCO NON-USER: CPT | Performed by: INTERNAL MEDICINE

## 2024-10-29 PROCEDURE — 1160F RVW MEDS BY RX/DR IN RCRD: CPT | Performed by: INTERNAL MEDICINE

## 2024-10-29 PROCEDURE — 1159F MED LIST DOCD IN RCRD: CPT | Performed by: INTERNAL MEDICINE

## 2024-10-29 RX ORDER — DOXYCYCLINE 100 MG/1
100 CAPSULE ORAL 2 TIMES DAILY
Qty: 14 CAPSULE | Refills: 0 | Status: SHIPPED | OUTPATIENT
Start: 2024-10-29 | End: 2024-11-05

## 2024-10-29 RX ORDER — FLUTICASONE PROPIONATE 44 UG/1
2 AEROSOL, METERED RESPIRATORY (INHALATION)
Qty: 10.6 G | Refills: 11 | Status: SHIPPED | OUTPATIENT
Start: 2024-10-29 | End: 2024-10-29

## 2024-10-29 RX ORDER — METHYLPREDNISOLONE 4 MG/1
TABLET ORAL
Qty: 21 TABLET | Refills: 0 | Status: SHIPPED | OUTPATIENT
Start: 2024-10-29

## 2024-10-29 ASSESSMENT — ENCOUNTER SYMPTOMS
COUGH: 1
AGITATION: 0
GASTROINTESTINAL NEGATIVE: 1
LIGHT-HEADEDNESS: 0
EYES NEGATIVE: 1
DIARRHEA: 0
SHORTNESS OF BREATH: 0
DYSURIA: 0
BACK PAIN: 0
WHEEZING: 0
VOMITING: 0
CARDIOVASCULAR NEGATIVE: 1
MUSCULOSKELETAL NEGATIVE: 1
WEAKNESS: 0
NAUSEA: 0
ABDOMINAL DISTENTION: 0
HEADACHES: 1
PALPITATIONS: 0
CONSTIPATION: 0
RHINORRHEA: 0
CHILLS: 0
ENDOCRINE NEGATIVE: 1
PSYCHIATRIC NEGATIVE: 1
DIZZINESS: 0
ABDOMINAL PAIN: 0
FEVER: 0

## 2024-10-29 ASSESSMENT — PATIENT HEALTH QUESTIONNAIRE - PHQ9
SUM OF ALL RESPONSES TO PHQ9 QUESTIONS 1 AND 2: 0
1. LITTLE INTEREST OR PLEASURE IN DOING THINGS: NOT AT ALL
2. FEELING DOWN, DEPRESSED OR HOPELESS: NOT AT ALL

## 2024-11-12 ENCOUNTER — APPOINTMENT (OUTPATIENT)
Dept: PRIMARY CARE | Facility: CLINIC | Age: 69
End: 2024-11-12
Payer: MEDICARE

## 2024-11-12 VITALS
SYSTOLIC BLOOD PRESSURE: 130 MMHG | WEIGHT: 162.2 LBS | HEART RATE: 60 BPM | HEIGHT: 64 IN | DIASTOLIC BLOOD PRESSURE: 80 MMHG | BODY MASS INDEX: 27.69 KG/M2

## 2024-11-12 DIAGNOSIS — R73.01 IMPAIRED FASTING GLUCOSE: ICD-10-CM

## 2024-11-12 DIAGNOSIS — R20.2 NUMBNESS AND TINGLING OF BOTH FEET: Primary | ICD-10-CM

## 2024-11-12 DIAGNOSIS — G62.9 NEUROPATHY: ICD-10-CM

## 2024-11-12 DIAGNOSIS — R20.0 NUMBNESS AND TINGLING OF BOTH FEET: Primary | ICD-10-CM

## 2024-11-12 DIAGNOSIS — J45.20 MILD INTERMITTENT ASTHMA WITHOUT COMPLICATION (HHS-HCC): ICD-10-CM

## 2024-11-12 DIAGNOSIS — I10 BENIGN ESSENTIAL HTN: ICD-10-CM

## 2024-11-12 PROCEDURE — 3079F DIAST BP 80-89 MM HG: CPT | Performed by: INTERNAL MEDICINE

## 2024-11-12 PROCEDURE — 1036F TOBACCO NON-USER: CPT | Performed by: INTERNAL MEDICINE

## 2024-11-12 PROCEDURE — 3075F SYST BP GE 130 - 139MM HG: CPT | Performed by: INTERNAL MEDICINE

## 2024-11-12 PROCEDURE — 1159F MED LIST DOCD IN RCRD: CPT | Performed by: INTERNAL MEDICINE

## 2024-11-12 PROCEDURE — 3008F BODY MASS INDEX DOCD: CPT | Performed by: INTERNAL MEDICINE

## 2024-11-12 PROCEDURE — 1160F RVW MEDS BY RX/DR IN RCRD: CPT | Performed by: INTERNAL MEDICINE

## 2024-11-12 PROCEDURE — 99214 OFFICE O/P EST MOD 30 MIN: CPT | Performed by: INTERNAL MEDICINE

## 2024-11-12 ASSESSMENT — ENCOUNTER SYMPTOMS
CARDIOVASCULAR NEGATIVE: 1
EYES NEGATIVE: 1
PALPITATIONS: 0
WHEEZING: 0
ABDOMINAL PAIN: 0
COUGH: 0
VOMITING: 0
FEVER: 0
DIZZINESS: 0
DIARRHEA: 0
GASTROINTESTINAL NEGATIVE: 1
ABDOMINAL DISTENTION: 0
DYSURIA: 0
WEAKNESS: 0
ENDOCRINE NEGATIVE: 1
CONSTIPATION: 0
SHORTNESS OF BREATH: 0
CHILLS: 0
BACK PAIN: 0
MUSCULOSKELETAL NEGATIVE: 1
AGITATION: 0
RHINORRHEA: 0
LIGHT-HEADEDNESS: 0
HEADACHES: 0
PSYCHIATRIC NEGATIVE: 1
NUMBNESS: 1
NAUSEA: 0

## 2024-11-12 NOTE — PROGRESS NOTES
Subjective   Patient ID: Yolette Morrow is a 69 y.o. female who presents for Follow-up (2 WK ILLNESS CHECK. FEELING MUCH BETTER).  HPI  F/U ON ASTHMA EXACERBATION, WHICH IS RESOLVED. STOPPED USING THE DAILY PULMICORT, AND DOESN'T NEED TO USE THE PRN ALBUTEROL DAILY. WANTS TO HAVE NEUTOLOGIST REFERRAL FOR NUMBNESS AND TINGLING OF LEGS.   Review of Systems   Constitutional:  Negative for chills and fever.   HENT: Negative.  Negative for congestion, postnasal drip and rhinorrhea.    Eyes: Negative.  Negative for visual disturbance.   Respiratory:  Negative for cough, shortness of breath and wheezing.    Cardiovascular: Negative.  Negative for chest pain, palpitations and leg swelling.   Gastrointestinal: Negative.  Negative for abdominal distention, abdominal pain, constipation, diarrhea, nausea and vomiting.   Endocrine: Negative.    Genitourinary:  Negative for dysuria and urgency.   Musculoskeletal: Negative.  Negative for back pain.   Skin: Negative.  Negative for rash.   Allergic/Immunologic: Negative for immunocompromised state.   Neurological:  Positive for numbness. Negative for dizziness, weakness, light-headedness and headaches.   Psychiatric/Behavioral: Negative.  Negative for agitation.        Objective   Physical Exam  Constitutional:       General: She is not in acute distress.  HENT:      Head: Normocephalic.      Nose: Nose normal.      Mouth/Throat:      Mouth: Mucous membranes are moist.   Eyes:      Conjunctiva/sclera: Conjunctivae normal.      Pupils: Pupils are equal, round, and reactive to light.   Cardiovascular:      Rate and Rhythm: Normal rate and regular rhythm.      Pulses: Normal pulses.      Heart sounds: Normal heart sounds.   Pulmonary:      Effort: No respiratory distress.      Breath sounds: No wheezing.   Chest:      Chest wall: No tenderness.   Abdominal:      General: Abdomen is flat. Bowel sounds are normal.      Palpations: Abdomen is soft.      Tenderness: There is no  abdominal tenderness.   Musculoskeletal:         General: No tenderness. Normal range of motion.      Cervical back: Normal range of motion.   Lymphadenopathy:      Cervical: No cervical adenopathy.   Skin:     General: Skin is warm and dry.      Findings: No rash.   Neurological:      General: No focal deficit present.      Mental Status: She is alert. Mental status is at baseline.   Psychiatric:         Mood and Affect: Mood normal.         Behavior: Behavior normal.         Assessment/Plan   1. Numbness and tingling of both feet  Referral to Neurology      2. Neuropathy  Referral to Neurology      3. Benign essential HTN        4. Impaired fasting glucose        5. Mild intermittent asthma without complication ([facility])          ALL ASSESSED CHRONIC CONDITIONS ARE STABLE OR ADDRESSED.  HTN addressed as follow:    MONITOR BP   GOAL BP LOWER THAN 130/80  LOW SALT  EXERCISE DAILY, FALL PRECAUTION.  ADVISED TO HAVE LOW FAT AND LOW CALORIE DIET AND TO LOOSE WEIGHT, DAILY EXERCISE.  ADVISED TO CUT DOWN CAFFEINE.      MDM    1) COMPLEXITY: MORE THAN 1 STABLE CHRONIC CONDITION ADDRESSED  2)DATA: TESTS INTERPRETED AND OR ORDERED, TOOK INDEPENDENT HISTORY OR RECORDS REVIEWED  3)RISK: MODERATE RISK DUE TO NATURE OF MEDICAL CONDITIONS/COMORBIDITY OR MEDICATIONS ORDERED OR SURGICAL OR PROCEDURE REFERRAL, .       Has F/U

## 2024-11-14 ENCOUNTER — HOSPITAL ENCOUNTER (OUTPATIENT)
Dept: RADIOLOGY | Facility: HOSPITAL | Age: 69
Discharge: HOME | End: 2024-11-14
Payer: MEDICARE

## 2024-11-14 VITALS — HEIGHT: 64 IN | WEIGHT: 162.26 LBS | BODY MASS INDEX: 27.7 KG/M2

## 2024-11-14 DIAGNOSIS — Z12.31 ENCOUNTER FOR SCREENING MAMMOGRAM FOR MALIGNANT NEOPLASM OF BREAST: ICD-10-CM

## 2024-11-14 PROCEDURE — 77067 SCR MAMMO BI INCL CAD: CPT

## 2024-11-14 PROCEDURE — 77067 SCR MAMMO BI INCL CAD: CPT | Performed by: RADIOLOGY

## 2024-11-14 PROCEDURE — 77063 BREAST TOMOSYNTHESIS BI: CPT | Performed by: RADIOLOGY

## 2024-11-25 DIAGNOSIS — I10 BENIGN ESSENTIAL HTN: ICD-10-CM

## 2024-11-25 RX ORDER — METOPROLOL TARTRATE 25 MG/1
25 TABLET, FILM COATED ORAL 2 TIMES DAILY
Qty: 180 TABLET | Refills: 3 | Status: SHIPPED | OUTPATIENT
Start: 2024-11-25

## 2024-12-12 DIAGNOSIS — K21.9 GASTROESOPHAGEAL REFLUX DISEASE WITHOUT ESOPHAGITIS: ICD-10-CM

## 2024-12-12 DIAGNOSIS — I10 BENIGN ESSENTIAL HTN: ICD-10-CM

## 2024-12-12 RX ORDER — OMEPRAZOLE 40 MG/1
40 CAPSULE, DELAYED RELEASE ORAL
Qty: 90 CAPSULE | Refills: 3 | Status: SHIPPED | OUTPATIENT
Start: 2024-12-12

## 2024-12-12 RX ORDER — AMLODIPINE BESYLATE 5 MG/1
5 TABLET ORAL DAILY
Qty: 90 TABLET | Refills: 3 | Status: SHIPPED | OUTPATIENT
Start: 2024-12-12

## 2024-12-12 RX ORDER — FAMOTIDINE 20 MG/1
20 TABLET, FILM COATED ORAL 2 TIMES DAILY PRN
Qty: 180 TABLET | Refills: 3 | Status: SHIPPED | OUTPATIENT
Start: 2024-12-12

## 2025-01-06 ENCOUNTER — TELEPHONE (OUTPATIENT)
Dept: PRIMARY CARE | Facility: CLINIC | Age: 70
End: 2025-01-06
Payer: MEDICARE

## 2025-01-06 DIAGNOSIS — Z87.898 HISTORY OF MOTION SICKNESS: Primary | ICD-10-CM

## 2025-01-06 RX ORDER — SCOPOLAMINE 1 MG/3D
1 PATCH, EXTENDED RELEASE TRANSDERMAL
Qty: 10 PATCH | Refills: 0 | Status: SHIPPED | OUTPATIENT
Start: 2025-01-06 | End: 2025-03-07

## 2025-01-06 NOTE — TELEPHONE ENCOUNTER
"PATIENT CALLED STATING SHE IS GOING ON A CRUISE SOON AND REQUESTING RX FOR \"SEA SICK PATCH\". WILL YOU PRESCRIBE?   [facility]  "

## 2025-01-09 DIAGNOSIS — R05.3 CHRONIC COUGH: ICD-10-CM

## 2025-01-09 RX ORDER — MONTELUKAST SODIUM 10 MG/1
10 TABLET ORAL NIGHTLY PRN
Qty: 90 TABLET | Refills: 1 | Status: SHIPPED | OUTPATIENT
Start: 2025-01-09 | End: 2025-07-08

## 2025-01-27 ENCOUNTER — OFFICE VISIT (OUTPATIENT)
Dept: URGENT CARE | Facility: CLINIC | Age: 70
End: 2025-01-27
Payer: MEDICARE

## 2025-01-27 VITALS
OXYGEN SATURATION: 98 % | RESPIRATION RATE: 14 BRPM | WEIGHT: 155 LBS | HEIGHT: 64 IN | DIASTOLIC BLOOD PRESSURE: 86 MMHG | TEMPERATURE: 98.2 F | BODY MASS INDEX: 26.46 KG/M2 | SYSTOLIC BLOOD PRESSURE: 137 MMHG | HEART RATE: 66 BPM

## 2025-01-27 DIAGNOSIS — B96.89 ACUTE BACTERIAL SINUSITIS: Primary | ICD-10-CM

## 2025-01-27 DIAGNOSIS — J01.90 ACUTE BACTERIAL SINUSITIS: Primary | ICD-10-CM

## 2025-01-27 DIAGNOSIS — J45.21 MILD INTERMITTENT ASTHMA WITH ACUTE EXACERBATION (HHS-HCC): ICD-10-CM

## 2025-01-27 PROCEDURE — 99213 OFFICE O/P EST LOW 20 MIN: CPT | Performed by: NURSE PRACTITIONER

## 2025-01-27 RX ORDER — PREDNISONE 10 MG/1
30 TABLET ORAL DAILY
Qty: 21 TABLET | Refills: 0 | Status: SHIPPED | OUTPATIENT
Start: 2025-01-27 | End: 2025-02-03

## 2025-01-27 RX ORDER — BENZONATATE 100 MG/1
100 CAPSULE ORAL 3 TIMES DAILY PRN
Qty: 20 CAPSULE | Refills: 0 | Status: SHIPPED | OUTPATIENT
Start: 2025-01-27 | End: 2025-02-03

## 2025-01-27 RX ORDER — AMOXICILLIN AND CLAVULANATE POTASSIUM 875; 125 MG/1; MG/1
1 TABLET, FILM COATED ORAL 2 TIMES DAILY
Qty: 20 TABLET | Refills: 0 | Status: SHIPPED | OUTPATIENT
Start: 2025-01-27 | End: 2025-02-06

## 2025-01-27 NOTE — PROGRESS NOTES
[facility]   WENDY NOTE:      Name: Yolette Morrow, 69 y.o.    CSN:[id number]   MRN:[id number]    PCP: Renee Oh MD    ALL:    Allergies   Allergen Reactions    Milk Nausea Only    Mold Runny nose    Whey Hives       History:    Chief Complaint: URI (Cough, bi-lat ear pain, fatigued, congestion, sore throat off and on, sinus pressure x 11 days)    Encounter Date: 1/27/2025      HPI: The history was obtained from the patient. Yolette is a 69 y.o. female, who presents with a chief complaint of URI (Cough, bi-lat ear pain, fatigued, congestion, sore throat off and on, sinus pressure x 11 days) Sinus pressure and pain, nasal congestion with green mucus, bilateral ear pain/fullness, productive cough with green sputum, mild sore throat, and fatigue. Mild shortness of breath with activity, denies wheezing. Symptoms began days ago, reports symptoms have progressively worsened since onset. Denies any body aches, abdominal pain, chest pain, rashes, urinary symptoms, vomiting, and diarrhea. Denies any lightheadedness or dizziness; no changes in mental status. No swelling in legs. Appetite is decreased; is able to eat and drink fluids without difficulty. Has been taking utilized Mucinex without much relief; no other over-the-counter medications or home remedies for symptom management.  Denies any recent antibiotic use      PMHx:    Past Medical History:   Diagnosis Date    Allergic     Carpal tunnel syndrome, bilateral upper limbs     Bilateral carpal tunnel syndrome 03/21/2018    Colitis     Gastro-esophageal reflux disease with esophagitis, without bleeding     Esophagitis, reflux 12/04/2019    GERD (gastroesophageal reflux disease)     Headache     Hypertension     Personal history of malignant neoplasm of other organs and systems     History of squamous cell carcinoma    Personal history of other endocrine, nutritional and metabolic disease 12/02/2019    History of hypoglycemia    Personal  history of other medical treatment 11/25/2019    History of mammogram    Personal history of other medical treatment     H/O bone density study    Personal history of other specified conditions     History of dysphagia 01/13/2020              Current Outpatient Medications   Medication Sig Dispense Refill    albuterol (Ventolin HFA) 90 mcg/actuation inhaler Inhale 2 puffs 4 times a day as needed for wheezing or shortness of breath (Cough, SOB ,WHEEZING). 8 g 3    amLODIPine (Norvasc) 5 mg tablet TAKE 1 TABLET BY MOUTH EVERY DAY 90 tablet 3    blood sugar diagnostic (Easy Touch Test Strip) strip USE AS DIRECTED.      cyanocobalamin (Vitamin B-12) 500 mcg tablet Take 1 tablet (500 mcg) by mouth once daily.      famotidine (Pepcid) 20 mg tablet TAKE 1 TABLET (20 MG) BY MOUTH 2 TIMES A DAY AS NEEDED FOR HEARTBURN. 180 tablet 3    fexofenadine (Allegra) 180 mg tablet Take 1 tablet (180 mg) by mouth once daily. PRN 90 tablet 3    gabapentin (Neurontin) 100 mg capsule Take 2 capsules (200 mg) by mouth once daily at bedtime.      Lactobacillus acidophilus (PROBIOTIC ORAL) Take 1 capsule by mouth early in the morning.. neotonics      metoprolol tartrate (Lopressor) 25 mg tablet Take 1 tablet (25 mg) by mouth 2 times a day. 180 tablet 3    montelukast (Singulair) 10 mg tablet TAKE 1 TABLET (10 MG) BY MOUTH AS NEEDED AT BEDTIME (COUGH). 90 tablet 1    multivitamin tablet Take 1 tablet by mouth once daily.      omeprazole (PriLOSEC) 40 mg DR capsule TAKE 1 CAPSULE (40 MG) BY MOUTH ONCE DAILY IN THE MORNING. TAKE BEFORE MEALS. 90 capsule 3    amoxicillin-pot clavulanate (Augmentin) 875-125 mg tablet Take 1 tablet by mouth 2 times a day for 10 days. 20 tablet 0    benzonatate (Tessalon) 100 mg capsule Take 1 capsule (100 mg) by mouth 3 times a day as needed for cough for up to 7 days. Do not crush or chew. 20 capsule 0    methylPREDNISolone (Medrol Dospak) 4 mg tablets Follow schedule on package instructions (Patient not taking:  Reported on 1/27/2025) 21 tablet 0    oxyCODONE (Roxicodone) 5 mg immediate release tablet Take 1 tablet (5 mg) by mouth every 6 hours if needed for severe pain (7 - 10) or moderate pain (4 - 6). (Patient not taking: Reported on 1/27/2025)      predniSONE (Deltasone) 10 mg tablet Take 3 tablets (30 mg) by mouth once daily for 7 days. 21 tablet 0    scopolamine (Transderm-Scop) 1 mg over 3 days patch 3 day Place 1 patch over 72 hours on the skin every 3rd day if needed (nausea). (Patient not taking: Reported on 1/27/2025) 10 patch 0     Current Facility-Administered Medications   Medication Dose Route Frequency Provider Last Rate Last Admin    docusate sodium (Colace) capsule 100 mg  100 mg oral Once Renee Oh MD             PMSx:    Past Surgical History:   Procedure Laterality Date    BLADDER SURGERY  04/03/2020    Bladder Surgery    CHOLECYSTECTOMY  04/20/2023    DR. ASHLEIGH MCKEON    OTHER SURGICAL HISTORY Left 09/05/2018    Peripheral Nerve Block Wrist Median Left 09/05/2018    OTHER SURGICAL HISTORY Right 09/05/2018    Peripheral Nerve Block Wrist Median Right 09/05/2018    OTHER SURGICAL HISTORY      Dilation and curettage 04/03/2020    OTHER SURGICAL HISTORY  04/03/2020    Trigger finger repair 04/03/2020    OTHER SURGICAL HISTORY  09/01/2022    Esophagogastroduodenoscopy 09/01/2022    OTHER SURGICAL HISTORY  06/04/2019    Placement of esophageal sphincter augmentation magnetic device    OTHER SURGICAL HISTORY  04/03/2020    Gastrectomy Sleeve Laparoscopic    OTHER SURGICAL HISTORY  04/03/2020    Appendicostomy    OTHER SURGICAL HISTORY  04/03/2020    Carpal tunnel surgery    OTHER SURGICAL HISTORY  04/03/2020    Excision of squamous cell carcinoma    OTHER SURGICAL HISTORY  01/23/2019    Diaphragmatic hernia repair    OTHER SURGICAL HISTORY  09/01/2022    Colonoscopy    OTHER SURGICAL HISTORY  2024    spinal surgery       Fam Hx:   Family History   Problem Relation Name Age of Onset    Other (CARDIAC  DISORDER) Mother      Diabetes Father Dad     Hypertension Father Dad     Lung cancer Father Dad     Other (MALIGNANT NEOPLASM OF PROSTATE) Father Dad     Hypothyroidism Sister      Other (MALIGNANT NEOPLASM OF STOMACH) Other MAT GREAT GRANDMOTHER     Breast cancer Father's Sister      Breast cancer Mother's Sister         SOC. Hx:     Social History     Socioeconomic History    Marital status:      Spouse name: Not on file    Number of children: Not on file    Years of education: Not on file    Highest education level: Not on file   Occupational History    Not on file   Tobacco Use    Smoking status: Former     Current packs/day: 0.00     Types: Cigarettes     Quit date:      Years since quittin.1    Smokeless tobacco: Never   Vaping Use    Vaping status: Never Used   Substance and Sexual Activity    Alcohol use: Yes     Comment: SOCIALLY    Drug use: Never    Sexual activity: Not on file   Other Topics Concern    Not on file   Social History Narrative    Not on file     Social Drivers of Health     Financial Resource Strain: Not on file   Food Insecurity: Not on file   Transportation Needs: Not on file   Physical Activity: Not on file   Stress: Not on file   Social Connections: Not on file   Intimate Partner Violence: Not on file   Housing Stability: Not on file         Vitals:    25 1327   BP: 137/86   Pulse: 66   Resp: 14   Temp: 36.8 °C (98.2 °F)   SpO2: 98%     70.3 kg (155 lb)          Physical Exam  Vitals and nursing note reviewed.   Constitutional:       Appearance: Normal appearance.   HENT:      Head: Normocephalic and atraumatic.      Right Ear: Hearing, ear canal and external ear normal. A middle ear effusion is present. Tympanic membrane is erythematous. Tympanic membrane is not bulging. Tympanic membrane has normal mobility.      Left Ear: Hearing, ear canal and external ear normal. A middle ear effusion is present. Tympanic membrane is erythematous. Tympanic membrane is not  bulging. Tympanic membrane has normal mobility.      Nose: Congestion and rhinorrhea present. Rhinorrhea is purulent.      Right Sinus: Maxillary sinus tenderness and frontal sinus tenderness present.      Left Sinus: Maxillary sinus tenderness and frontal sinus tenderness present.      Mouth/Throat:      Lips: Pink.      Mouth: Mucous membranes are moist.      Pharynx: Uvula midline. Posterior oropharyngeal erythema present. No pharyngeal swelling or oropharyngeal exudate.      Tonsils: No tonsillar exudate.   Cardiovascular:      Rate and Rhythm: Normal rate and regular rhythm.      Heart sounds: Normal heart sounds.   Pulmonary:      Effort: Pulmonary effort is normal. No respiratory distress.      Breath sounds: No stridor. Wheezing present. No rhonchi or rales.   Chest:      Chest wall: No tenderness.   Abdominal:      General: Bowel sounds are normal.   Skin:     General: Skin is warm and dry.   Neurological:      Mental Status: She is alert and oriented to person, place, and time.       ____________________________________________________________________    I did personally review Yolette's past medical history, surgical history, social history, as well as family history (when relevant).  In this case, I also oversaw the her drug management by reviewing her medication list, allergy list, as well as the medications that I prescribed during the UC course and/or recommended as an out-patient (including possible OTC medications such as acetaminophen, NSAIDs , etc).    After reviewing the items above, I did look at previous medical documentation, such as recent hospitalizations, office visits, and/or recent consultations with PCP/specialist.                          SDOH:   Another factor that I considered in Yolette's care was her Social Determinants of Health (SDOH). During this UC encounter, she did not have social determinants of health. Those SDOH influencing Yolette's care are:  none      _____________________________________________________________________       COURSE/MEDICAL DECISION MAKING:    Yolette is a 69 y.o., who presents with a working diagnosis of   1. Acute bacterial sinusitis    2. Mild intermittent asthma with acute exacerbation ([facility])        Yolette was seen today for uri.  Diagnoses and all orders for this visit:  Acute bacterial sinusitis (Primary)  -     amoxicillin-pot clavulanate (Augmentin) 875-125 mg tablet; Take 1 tablet by mouth 2 times a day for 10 days.  -     benzonatate (Tessalon) 100 mg capsule; Take 1 capsule (100 mg) by mouth 3 times a day as needed for cough for up to 7 days. Do not crush or chew.  Mild intermittent asthma with acute exacerbation ([facility])  -     predniSONE (Deltasone) 10 mg tablet; Take 3 tablets (30 mg) by mouth once daily for 7 days.         Plan of care reviewed with patient.  If symptoms change and/or worsen will follow-up with primary care provider, return to urgent care, or go to the nearest emergency department for further evaluation.  Patient states understanding and is agreeable with plan of care.      PATTI Sharma, DAVINA   Advanced Practice Provider  [facility]    Please note: While the patient may or may not have received printed discharge paperwork, all relevant medical findings, test results, and treatment details are accessible through the electronic medical record system. The patient is encouraged to review their chart via the patient portal for comprehensive information and follow-up instructions.

## 2025-02-09 LAB
ALBUMIN SERPL-MCNC: 4.1 G/DL (ref 3.6–5.1)
ALP SERPL-CCNC: 59 U/L (ref 37–153)
ALT SERPL-CCNC: 19 U/L (ref 6–29)
ANION GAP SERPL CALCULATED.4IONS-SCNC: 8 MMOL/L (CALC) (ref 7–17)
AST SERPL-CCNC: 21 U/L (ref 10–35)
BASOPHILS # BLD AUTO: 40 CELLS/UL (ref 0–200)
BASOPHILS NFR BLD AUTO: 0.6 %
BILIRUB SERPL-MCNC: 0.5 MG/DL (ref 0.2–1.2)
BUN SERPL-MCNC: 11 MG/DL (ref 7–25)
CALCIUM SERPL-MCNC: 8.8 MG/DL (ref 8.6–10.4)
CHLORIDE SERPL-SCNC: 104 MMOL/L (ref 98–110)
CO2 SERPL-SCNC: 28 MMOL/L (ref 20–32)
CREAT SERPL-MCNC: 0.71 MG/DL (ref 0.5–1.05)
EGFRCR SERPLBLD CKD-EPI 2021: 93 ML/MIN/1.73M2
EOSINOPHIL # BLD AUTO: 178 CELLS/UL (ref 15–500)
EOSINOPHIL NFR BLD AUTO: 2.7 %
ERYTHROCYTE [DISTWIDTH] IN BLOOD BY AUTOMATED COUNT: 13.7 % (ref 11–15)
EST. AVERAGE GLUCOSE BLD GHB EST-MCNC: 123 MG/DL
EST. AVERAGE GLUCOSE BLD GHB EST-SCNC: 6.8 MMOL/L
FERRITIN SERPL-MCNC: 11 NG/ML (ref 16–288)
FOLATE SERPL-MCNC: >24 NG/ML
GLUCOSE SERPL-MCNC: 86 MG/DL (ref 65–99)
HBA1C MFR BLD: 5.9 % OF TOTAL HGB
HCT VFR BLD AUTO: 39.3 % (ref 35–45)
HGB BLD-MCNC: 12.3 G/DL (ref 11.7–15.5)
IRON SATN MFR SERPL: 42 % (CALC) (ref 16–45)
IRON SERPL-MCNC: 149 MCG/DL (ref 45–160)
LYMPHOCYTES # BLD AUTO: 2574 CELLS/UL (ref 850–3900)
LYMPHOCYTES NFR BLD AUTO: 39 %
MCH RBC QN AUTO: 27.6 PG (ref 27–33)
MCHC RBC AUTO-ENTMCNC: 31.3 G/DL (ref 32–36)
MCV RBC AUTO: 88.1 FL (ref 80–100)
METHYLMALONATE SERPL-SCNC: 87 NMOL/L (ref 69–390)
MONOCYTES # BLD AUTO: 673 CELLS/UL (ref 200–950)
MONOCYTES NFR BLD AUTO: 10.2 %
NEUTROPHILS # BLD AUTO: 3135 CELLS/UL (ref 1500–7800)
NEUTROPHILS NFR BLD AUTO: 47.5 %
PLATELET # BLD AUTO: 325 THOUSAND/UL (ref 140–400)
PMV BLD REES-ECKER: 10.5 FL (ref 7.5–12.5)
POTASSIUM SERPL-SCNC: 4.6 MMOL/L (ref 3.5–5.3)
PROT SERPL-MCNC: 6.4 G/DL (ref 6.1–8.1)
RBC # BLD AUTO: 4.46 MILLION/UL (ref 3.8–5.1)
SODIUM SERPL-SCNC: 140 MMOL/L (ref 135–146)
TIBC SERPL-MCNC: 355 MCG/DL (CALC) (ref 250–450)
TSH SERPL-ACNC: 0.86 MIU/L (ref 0.4–4.5)
VIT B12 SERPL-MCNC: 1256 PG/ML (ref 200–1100)
WBC # BLD AUTO: 6.6 THOUSAND/UL (ref 3.8–10.8)

## 2025-02-13 ENCOUNTER — APPOINTMENT (OUTPATIENT)
Dept: PRIMARY CARE | Facility: CLINIC | Age: 70
End: 2025-02-13
Payer: MEDICARE

## 2025-02-13 VITALS
WEIGHT: 140 LBS | SYSTOLIC BLOOD PRESSURE: 124 MMHG | HEIGHT: 64 IN | DIASTOLIC BLOOD PRESSURE: 80 MMHG | HEART RATE: 80 BPM | BODY MASS INDEX: 23.9 KG/M2

## 2025-02-13 DIAGNOSIS — R42 LIGHT HEADEDNESS: ICD-10-CM

## 2025-02-13 DIAGNOSIS — K92.1 BLOOD IN STOOL: ICD-10-CM

## 2025-02-13 DIAGNOSIS — E61.1 IRON DEFICIENCY: ICD-10-CM

## 2025-02-13 DIAGNOSIS — R79.89 ELEVATED VITAMIN B12 LEVEL: ICD-10-CM

## 2025-02-13 DIAGNOSIS — R63.4 WEIGHT LOSS: ICD-10-CM

## 2025-02-13 DIAGNOSIS — R10.32 ABDOMINAL PAIN, LLQ: ICD-10-CM

## 2025-02-13 DIAGNOSIS — I10 BENIGN ESSENTIAL HTN: ICD-10-CM

## 2025-02-13 DIAGNOSIS — Z00.00 ROUTINE GENERAL MEDICAL EXAMINATION AT HEALTH CARE FACILITY: Primary | ICD-10-CM

## 2025-02-13 PROBLEM — R71.8 MICROCYTOSIS: Status: RESOLVED | Noted: 2023-02-06 | Resolved: 2025-02-13

## 2025-02-13 PROBLEM — R10.9 CHRONIC RIGHT FLANK PAIN: Status: RESOLVED | Noted: 2023-02-06 | Resolved: 2025-02-13

## 2025-02-13 PROBLEM — G89.29 CHRONIC RIGHT FLANK PAIN: Status: RESOLVED | Noted: 2023-02-06 | Resolved: 2025-02-13

## 2025-02-13 PROBLEM — E77.8 HYPOPROTEINEMIA (MULTI): Status: RESOLVED | Noted: 2023-02-06 | Resolved: 2025-02-13

## 2025-02-13 PROBLEM — Z86.16 HISTORY OF COVID-19: Status: RESOLVED | Noted: 2023-02-06 | Resolved: 2025-02-13

## 2025-02-13 PROBLEM — M65.331 TRIGGER MIDDLE FINGER OF RIGHT HAND: Status: RESOLVED | Noted: 2023-02-06 | Resolved: 2025-02-13

## 2025-02-13 PROBLEM — D50.9 IRON DEFICIENCY ANEMIA: Status: RESOLVED | Noted: 2023-02-06 | Resolved: 2025-02-13

## 2025-02-13 PROCEDURE — 1170F FXNL STATUS ASSESSED: CPT | Performed by: INTERNAL MEDICINE

## 2025-02-13 PROCEDURE — 1036F TOBACCO NON-USER: CPT | Performed by: INTERNAL MEDICINE

## 2025-02-13 PROCEDURE — G0439 PPPS, SUBSEQ VISIT: HCPCS | Performed by: INTERNAL MEDICINE

## 2025-02-13 PROCEDURE — 1160F RVW MEDS BY RX/DR IN RCRD: CPT | Performed by: INTERNAL MEDICINE

## 2025-02-13 PROCEDURE — 3079F DIAST BP 80-89 MM HG: CPT | Performed by: INTERNAL MEDICINE

## 2025-02-13 PROCEDURE — 1124F ACP DISCUSS-NO DSCNMKR DOCD: CPT | Performed by: INTERNAL MEDICINE

## 2025-02-13 PROCEDURE — 3008F BODY MASS INDEX DOCD: CPT | Performed by: INTERNAL MEDICINE

## 2025-02-13 PROCEDURE — 99497 ADVNCD CARE PLAN 30 MIN: CPT | Performed by: INTERNAL MEDICINE

## 2025-02-13 PROCEDURE — 3074F SYST BP LT 130 MM HG: CPT | Performed by: INTERNAL MEDICINE

## 2025-02-13 PROCEDURE — 1159F MED LIST DOCD IN RCRD: CPT | Performed by: INTERNAL MEDICINE

## 2025-02-13 PROCEDURE — 99214 OFFICE O/P EST MOD 30 MIN: CPT | Performed by: INTERNAL MEDICINE

## 2025-02-13 ASSESSMENT — ENCOUNTER SYMPTOMS
DYSURIA: 0
WEAKNESS: 0
SHORTNESS OF BREATH: 0
ABDOMINAL DISTENTION: 0
HEADACHES: 0
APPETITE CHANGE: 1
CARDIOVASCULAR NEGATIVE: 1
VOMITING: 0
WHEEZING: 0
ENDOCRINE NEGATIVE: 1
RHINORRHEA: 0
PSYCHIATRIC NEGATIVE: 1
LIGHT-HEADEDNESS: 0
MUSCULOSKELETAL NEGATIVE: 1
COUGH: 0
DIARRHEA: 0
ROS GI COMMENTS: PER HPI
EYES NEGATIVE: 1
BACK PAIN: 0
CHILLS: 0
DIZZINESS: 0
AGITATION: 0
NEUROLOGICAL NEGATIVE: 1
PALPITATIONS: 0
ABDOMINAL PAIN: 0
NAUSEA: 0
FEVER: 0
CONSTIPATION: 0

## 2025-02-13 ASSESSMENT — PATIENT HEALTH QUESTIONNAIRE - PHQ9
1. LITTLE INTEREST OR PLEASURE IN DOING THINGS: NOT AT ALL
2. FEELING DOWN, DEPRESSED OR HOPELESS: NOT AT ALL
2. FEELING DOWN, DEPRESSED OR HOPELESS: NOT AT ALL
1. LITTLE INTEREST OR PLEASURE IN DOING THINGS: NOT AT ALL
SUM OF ALL RESPONSES TO PHQ9 QUESTIONS 1 AND 2: 0
SUM OF ALL RESPONSES TO PHQ9 QUESTIONS 1 AND 2: 0

## 2025-02-13 ASSESSMENT — ACTIVITIES OF DAILY LIVING (ADL)
DOING_HOUSEWORK: INDEPENDENT
BATHING: INDEPENDENT
MANAGING_FINANCES: INDEPENDENT
GROCERY_SHOPPING: INDEPENDENT
TAKING_MEDICATION: INDEPENDENT
DRESSING: INDEPENDENT

## 2025-02-13 NOTE — PROGRESS NOTES
"Subjective   Reason for Visit: Yolette Morrow is an 69 y.o. female here for a Medicare Wellness visit.     Past Medical, Surgical, and Family History reviewed and updated in chart.    Reviewed all medications by prescribing practitioner or clinical pharmacist (such as prescriptions, OTCs, herbal therapies and supplements) and documented in the medical record.    HPI  Lab F/U , UNINTENTIONAL WEIGHT LOSS (18 LBS IN 3 MONTHS) . HAD EPISODE OF PASSING FRESH RED BLOOD IN STOOL X 1 DAY WITH ABDOMINAL PAIN ,2-3 WEEKS AGO. PIN IS RESOLVED. FOLLOWS DAIRY FREE DIET . RECENT POOR APPETITE . LIGHT HEADEDNESS , FEELING OF PASSING OUT ON AND OFF.  MEDICARE WELLNESS EXAM.  Patient Care Team:  Renee Oh MD as PCP - General  Renee Oh MD as PCP [place] [facility] [place]     Review of Systems   Constitutional:  Positive for appetite change. Negative for chills and fever.        WEIGHT LOSS   HENT: Negative.  Negative for congestion, postnasal drip and rhinorrhea.    Eyes: Negative.  Negative for visual disturbance.   Respiratory:  Negative for cough, shortness of breath and wheezing.    Cardiovascular: Negative.  Negative for chest pain, palpitations and leg swelling.   Gastrointestinal:  Negative for abdominal distention, abdominal pain, constipation, diarrhea, nausea and vomiting.        PER HPI   Endocrine: Negative.    Genitourinary:  Negative for dysuria and urgency.   Musculoskeletal: Negative.  Negative for back pain.   Skin: Negative.  Negative for rash.   Allergic/Immunologic: Negative for immunocompromised state.   Neurological: Negative.  Negative for dizziness, weakness, light-headedness and headaches.   Psychiatric/Behavioral: Negative.  Negative for agitation.        Objective   Vitals:  /80   Pulse 80   Ht 1.626 m (5' 4\")   Wt 63.5 kg (140 lb)   BMI 24.03 kg/m²       Physical Exam  Constitutional:       General: She is not in acute distress.  HENT:      Head: Normocephalic.      Nose: " Nose normal.      Mouth/Throat:      Mouth: Mucous membranes are moist.   Eyes:      Conjunctiva/sclera: Conjunctivae normal.      Pupils: Pupils are equal, round, and reactive to light.   Cardiovascular:      Rate and Rhythm: Normal rate and regular rhythm.      Pulses: Normal pulses.      Heart sounds: Normal heart sounds.   Pulmonary:      Effort: No respiratory distress.      Breath sounds: No wheezing.   Chest:      Chest wall: No tenderness.   Abdominal:      General: Abdomen is flat. Bowel sounds are normal.      Palpations: Abdomen is soft.      Tenderness: There is abdominal tenderness.      Comments: LLQ TENDERNESS   Musculoskeletal:         General: No tenderness. Normal range of motion.      Cervical back: Normal range of motion.   Lymphadenopathy:      Cervical: No cervical adenopathy.   Skin:     General: Skin is warm and dry.      Findings: No rash.   Neurological:      General: No focal deficit present.      Mental Status: She is alert. Mental status is at baseline.   Psychiatric:         Mood and Affect: Mood normal.         Behavior: Behavior normal.         Assessment & Plan  Routine general medical examination at health care facility    Orders:    1 Year Follow Up In Primary Care - Wellness Exam; Future    Benign essential HTN         Blood in stool    Orders:    CT abdomen pelvis w IV contrast; Future    Weight loss    Orders:    CT abdomen pelvis w IV contrast; Future    Iron deficiency         Abdominal pain, LLQ    Orders:    CT abdomen pelvis w IV contrast; Future    Elevated vitamin B12 level         Light headedness          TEST RESULTS WERE DISCUSSED.  Advanced Care Planing discussed, diagnosis , treatment and prognosis discussed with pt for 16 minutes,pt has capacity to make own decision, pt has a living will, to bring a copy for the chart.  ALL ASSESSED CHRONIC CONDITIONS ARE STABLE OR ADDRESSED.  ADVISED TO HAVE LOW FAT AND LOW CALORIE DIET , FALL PRECAUTION.     HTN addressed as  follow:    MONITOR BP   GOAL BP LOWER THAN 130/80. WILL STOP THE METOPROLOL WITH HAVING LIGHT HEADEDNESS AND WEIGHT LOSS.    MDM    1) COMPLEXITY: 1 UNDIAGNOSED NEW PROBLEM WITH UNCERTAIN PROGNOSIS  2)DATA: TESTS INTERPRETED AND OR ORDERED, TOOK INDEPENDENT HISTORY OR RECORDS REVIEWED  3)RISK: MODERATE RISK DUE TO NATURE OF MEDICAL CONDITIONS/COMORBIDITY OR MEDICATIONS ORDERED OR SURGICAL OR PROCEDURE REFERRAL, .        2 WEEKS

## 2025-02-14 ENCOUNTER — HOSPITAL ENCOUNTER (OUTPATIENT)
Dept: RADIOLOGY | Facility: HOSPITAL | Age: 70
Discharge: HOME | End: 2025-02-14
Payer: MEDICARE

## 2025-02-14 DIAGNOSIS — R63.4 WEIGHT LOSS: ICD-10-CM

## 2025-02-14 DIAGNOSIS — K92.1 BLOOD IN STOOL: ICD-10-CM

## 2025-02-14 DIAGNOSIS — R10.32 ABDOMINAL PAIN, LLQ: ICD-10-CM

## 2025-02-14 PROCEDURE — 74177 CT ABD & PELVIS W/CONTRAST: CPT

## 2025-02-14 PROCEDURE — 2550000001 HC RX 255 CONTRASTS: Performed by: INTERNAL MEDICINE

## 2025-02-14 RX ADMIN — IOHEXOL 72 ML: 350 INJECTION, SOLUTION INTRAVENOUS at 08:49

## 2025-02-24 ENCOUNTER — APPOINTMENT (OUTPATIENT)
Dept: PRIMARY CARE | Facility: CLINIC | Age: 70
End: 2025-02-24
Payer: MEDICARE

## 2025-02-24 VITALS
WEIGHT: 156.7 LBS | HEART RATE: 75 BPM | OXYGEN SATURATION: 98 % | SYSTOLIC BLOOD PRESSURE: 128 MMHG | HEIGHT: 64 IN | DIASTOLIC BLOOD PRESSURE: 86 MMHG | BODY MASS INDEX: 26.75 KG/M2

## 2025-02-24 DIAGNOSIS — R10.31 ABDOMINAL PAIN, RLQ: ICD-10-CM

## 2025-02-24 DIAGNOSIS — Z82.49 FAMILY HISTORY OF EARLY CAD: ICD-10-CM

## 2025-02-24 DIAGNOSIS — R05.1 ACUTE COUGH: ICD-10-CM

## 2025-02-24 DIAGNOSIS — E78.00 HYPERCHOLESTEROLEMIA: ICD-10-CM

## 2025-02-24 DIAGNOSIS — Z98.84 BARIATRIC SURGERY STATUS: ICD-10-CM

## 2025-02-24 DIAGNOSIS — I70.0 ATHEROSCLEROSIS OF ABDOMINAL AORTA (CMS-HCC): ICD-10-CM

## 2025-02-24 DIAGNOSIS — K92.1 BLOOD IN STOOL: ICD-10-CM

## 2025-02-24 DIAGNOSIS — R10.32 ABDOMINAL PAIN, LLQ: Primary | ICD-10-CM

## 2025-02-24 DIAGNOSIS — J06.9 ACUTE URI: ICD-10-CM

## 2025-02-24 PROBLEM — E78.1 HYPERTRIGLYCERIDEMIA: Status: ACTIVE | Noted: 2025-02-24

## 2025-02-24 PROCEDURE — 1160F RVW MEDS BY RX/DR IN RCRD: CPT | Performed by: INTERNAL MEDICINE

## 2025-02-24 PROCEDURE — 3079F DIAST BP 80-89 MM HG: CPT | Performed by: INTERNAL MEDICINE

## 2025-02-24 PROCEDURE — 1036F TOBACCO NON-USER: CPT | Performed by: INTERNAL MEDICINE

## 2025-02-24 PROCEDURE — 3008F BODY MASS INDEX DOCD: CPT | Performed by: INTERNAL MEDICINE

## 2025-02-24 PROCEDURE — 3074F SYST BP LT 130 MM HG: CPT | Performed by: INTERNAL MEDICINE

## 2025-02-24 PROCEDURE — 1159F MED LIST DOCD IN RCRD: CPT | Performed by: INTERNAL MEDICINE

## 2025-02-24 PROCEDURE — 99214 OFFICE O/P EST MOD 30 MIN: CPT | Performed by: INTERNAL MEDICINE

## 2025-02-24 RX ORDER — ATORVASTATIN CALCIUM 20 MG/1
20 TABLET, FILM COATED ORAL DAILY
Qty: 90 TABLET | Refills: 3 | Status: SHIPPED | OUTPATIENT
Start: 2025-02-24

## 2025-02-24 RX ORDER — PREDNISONE 5 MG/1
5 TABLET ORAL DAILY
Qty: 5 TABLET | Refills: 0 | Status: SHIPPED | OUTPATIENT
Start: 2025-02-24 | End: 2025-03-01

## 2025-02-24 RX ORDER — AMOXICILLIN 500 MG/1
500 CAPSULE ORAL EVERY 8 HOURS SCHEDULED
Qty: 21 CAPSULE | Refills: 0 | Status: SHIPPED | OUTPATIENT
Start: 2025-02-24 | End: 2025-03-03

## 2025-02-24 ASSESSMENT — ENCOUNTER SYMPTOMS
LIGHT-HEADEDNESS: 0
RHINORRHEA: 0
CONSTIPATION: 0
NEUROLOGICAL NEGATIVE: 1
SORE THROAT: 1
COUGH: 1
PALPITATIONS: 0
BACK PAIN: 0
SHORTNESS OF BREATH: 0
DYSURIA: 0
CHILLS: 0
WHEEZING: 0
MUSCULOSKELETAL NEGATIVE: 1
HEADACHES: 0
AGITATION: 0
DIZZINESS: 0
ABDOMINAL DISTENTION: 0
FEVER: 0
DIARRHEA: 0
ABDOMINAL PAIN: 1
CARDIOVASCULAR NEGATIVE: 1
NAUSEA: 0
ENDOCRINE NEGATIVE: 1
EYES NEGATIVE: 1
ROS GI COMMENTS: LLQ, RLQ
WEAKNESS: 0
VOMITING: 0
PSYCHIATRIC NEGATIVE: 1

## 2025-02-24 NOTE — PROGRESS NOTES
Subjective   Patient ID: Yolette Morrow is a 69 y.o. female who presents for Follow-up (2 week follow up CT SCAN ABD/PELVIS).  HPI  F/U ON CT OF ABDOMEN AND PELVIS .STILL HAS THE ABDOMINAL PAIN LLQ AND RLQ,BUT LESS SEVERE 3/10 ON AND OFF. HAD EXPOSURE TO YOUNG GRANDKIDS WITH COLD AND COMPLAINS OF SORE THROAT WITH DRY COUGH X 4 DAYS . OTC TX HELPS A LITTLE. FAMILY HISTORY OF EARLY CAD (MOTHER AND GRANDMOTHER  AROUND AGE 50 OF [place])  Review of Systems   Constitutional:  Negative for chills and fever.   HENT:  Positive for congestion and sore throat. Negative for postnasal drip and rhinorrhea.    Eyes: Negative.  Negative for visual disturbance.   Respiratory:  Positive for cough. Negative for shortness of breath and wheezing.    Cardiovascular: Negative.  Negative for chest pain, palpitations and leg swelling.   Gastrointestinal:  Positive for abdominal pain. Negative for abdominal distention, constipation, diarrhea, nausea and vomiting.        LLQ, RLQ     Endocrine: Negative.    Genitourinary:  Negative for dysuria and urgency.   Musculoskeletal: Negative.  Negative for back pain.   Skin: Negative.  Negative for rash.   Allergic/Immunologic: Negative for immunocompromised state.   Neurological: Negative.  Negative for dizziness, weakness, light-headedness and headaches.   Psychiatric/Behavioral: Negative.  Negative for agitation.        Objective   Physical Exam  Constitutional:       General: She is not in acute distress.  HENT:      Head: Normocephalic.      Nose: Nose normal.      Mouth/Throat:      Mouth: Mucous membranes are moist.   Eyes:      Conjunctiva/sclera: Conjunctivae normal.      Pupils: Pupils are equal, round, and reactive to light.   Cardiovascular:      Rate and Rhythm: Normal rate and regular rhythm.      Pulses: Normal pulses.      Heart sounds: Normal heart sounds.   Pulmonary:      Effort: No respiratory distress.      Breath sounds: No wheezing.   Chest:      Chest wall: No tenderness.    Abdominal:      General: Abdomen is flat. Bowel sounds are normal.      Palpations: Abdomen is soft.      Tenderness: There is abdominal tenderness.      Comments: RLQ, LLQ TENDERNESS   Musculoskeletal:         General: No tenderness. Normal range of motion.      Cervical back: Normal range of motion.   Lymphadenopathy:      Cervical: No cervical adenopathy.   Skin:     General: Skin is warm and dry.      Findings: No rash.   Neurological:      General: No focal deficit present.      Mental Status: She is alert. Mental status is at baseline.   Psychiatric:         Mood and Affect: Mood normal.         Behavior: Behavior normal.         Assessment/Plan   1. Abdominal pain, LLQ  Colonoscopy Diagnostic      2. Atherosclerosis of abdominal aorta (CMS-HCC)  atorvastatin (Lipitor) 20 mg tablet      3. Hypercholesterolemia  atorvastatin (Lipitor) 20 mg tablet      4. Bariatric surgery status  Colonoscopy Diagnostic      5. Blood in stool  Colonoscopy Diagnostic      6. Abdominal pain, RLQ  Colonoscopy Diagnostic      7. Acute URI  amoxicillin (Amoxil) 500 mg capsule      8. Acute cough  amoxicillin (Amoxil) 500 mg capsule    predniSONE (Deltasone) 5 mg tablet      9. Family history of early CAD        TEST RESULTS WERE DISCUSSED.  ADVISED TO HAVE LOW FAT AND LOW CALORIE DIET AND TO LOOSE WEIGHT, DAILY EXERCISE.  PT. WAS INSTRUCTED TO INCREASE FLUID INTAKE ,TAKE TYLENOL 650 MG PO Q6H/PRN FOR PAIN OR FEVER AND TAKE ROBITUSSIN OTC 2 TSP Q 6H/PRN FOR COUGH.  ADVISED TO Gargle with WARM SALTWATER.  ADVISED TO ELEVATE THE HEAD OF THE BED FOR SLEEP AND TO SLEEP WITH WARM HUMIDIFIER.  EXPLAINED THE NEED TO START LIPITOR FOR ATHEROSCLEROSIS AND FAMILY HISTORY .    MDM    1) COMPLEXITY: MORE THAN 1 STABLE CHRONIC CONDITION ADDRESSED  2)DATA: TESTS INTERPRETED AND OR ORDERED, TOOK INDEPENDENT HISTORY OR RECORDS REVIEWED  3)RISK: MODERATE RISK DUE TO NATURE OF MEDICAL CONDITIONS/COMORBIDITY OR MEDICATIONS ORDERED OR SURGICAL OR  PROCEDURE REFERRAL, .      7 WEEKS

## 2025-04-03 ENCOUNTER — APPOINTMENT (OUTPATIENT)
Dept: GASTROENTEROLOGY | Facility: CLINIC | Age: 70
End: 2025-04-03
Payer: MEDICARE

## 2025-04-03 ENCOUNTER — HOSPITAL ENCOUNTER (OUTPATIENT)
Dept: OPERATING ROOM | Facility: HOSPITAL | Age: 70
Setting detail: OUTPATIENT SURGERY
Discharge: HOME | End: 2025-04-03
Payer: MEDICARE

## 2025-04-03 VITALS
BODY MASS INDEX: 27.08 KG/M2 | SYSTOLIC BLOOD PRESSURE: 106 MMHG | DIASTOLIC BLOOD PRESSURE: 78 MMHG | WEIGHT: 158.6 LBS | HEART RATE: 61 BPM | OXYGEN SATURATION: 98 % | HEIGHT: 64 IN | TEMPERATURE: 97.4 F | RESPIRATION RATE: 16 BRPM

## 2025-04-03 DIAGNOSIS — R10.31 ABDOMINAL PAIN, RLQ: ICD-10-CM

## 2025-04-03 DIAGNOSIS — K92.1 BLOOD IN STOOL: ICD-10-CM

## 2025-04-03 DIAGNOSIS — R10.32 ABDOMINAL PAIN, LLQ: ICD-10-CM

## 2025-04-03 DIAGNOSIS — Z98.84 BARIATRIC SURGERY STATUS: ICD-10-CM

## 2025-04-03 PROCEDURE — 7100000009 HC PHASE TWO TIME - INITIAL BASE CHARGE

## 2025-04-03 PROCEDURE — 7100000010 HC PHASE TWO TIME - EACH INCREMENTAL 1 MINUTE

## 2025-04-03 PROCEDURE — 3700000012 HC SEDATION LEVEL 5+ TIME - INITIAL 15 MINUTES 5/> YEARS

## 2025-04-03 PROCEDURE — 2500000004 HC RX 250 GENERAL PHARMACY W/ HCPCS (ALT 636 FOR OP/ED): Performed by: INTERNAL MEDICINE

## 2025-04-03 PROCEDURE — 45380 COLONOSCOPY AND BIOPSY: CPT | Performed by: INTERNAL MEDICINE

## 2025-04-03 PROCEDURE — 3600000007 HC OR TIME - EACH INCREMENTAL 1 MINUTE - PROCEDURE LEVEL TWO

## 2025-04-03 PROCEDURE — 3600000002 HC OR TIME - INITIAL BASE CHARGE - PROCEDURE LEVEL TWO

## 2025-04-03 RX ORDER — MIDAZOLAM HYDROCHLORIDE 5 MG/ML
INJECTION, SOLUTION INTRAMUSCULAR; INTRAVENOUS AS NEEDED
Status: COMPLETED | OUTPATIENT
Start: 2025-04-03 | End: 2025-04-03

## 2025-04-03 RX ORDER — SODIUM CHLORIDE, SODIUM LACTATE, POTASSIUM CHLORIDE, CALCIUM CHLORIDE 600; 310; 30; 20 MG/100ML; MG/100ML; MG/100ML; MG/100ML
50 INJECTION, SOLUTION INTRAVENOUS CONTINUOUS
Status: DISCONTINUED | OUTPATIENT
Start: 2025-04-03 | End: 2025-04-04 | Stop reason: HOSPADM

## 2025-04-03 RX ORDER — MEPERIDINE HYDROCHLORIDE 25 MG/ML
INJECTION INTRAMUSCULAR; INTRAVENOUS; SUBCUTANEOUS AS NEEDED
Status: COMPLETED | OUTPATIENT
Start: 2025-04-03 | End: 2025-04-03

## 2025-04-03 RX ADMIN — MIDAZOLAM HYDROCHLORIDE 2.5 MG: 5 INJECTION, SOLUTION INTRAMUSCULAR; INTRAVENOUS at 08:28

## 2025-04-03 RX ADMIN — SODIUM CHLORIDE, SODIUM LACTATE, POTASSIUM CHLORIDE, AND CALCIUM CHLORIDE 50 ML/HR: .6; .31; .03; .02 INJECTION, SOLUTION INTRAVENOUS at 07:54

## 2025-04-03 RX ADMIN — MEPERIDINE HYDROCHLORIDE 25 MG: 25 INJECTION INTRAMUSCULAR; INTRAVENOUS; SUBCUTANEOUS at 08:26

## 2025-04-03 RX ADMIN — MIDAZOLAM HYDROCHLORIDE 2.5 MG: 5 INJECTION, SOLUTION INTRAMUSCULAR; INTRAVENOUS at 08:25

## 2025-04-03 ASSESSMENT — PAIN - FUNCTIONAL ASSESSMENT
PAIN_FUNCTIONAL_ASSESSMENT: 0-10

## 2025-04-03 ASSESSMENT — PAIN SCALES - GENERAL
PAINLEVEL_OUTOF10: 0 - NO PAIN
PAINLEVEL_OUTOF10: 3
PAINLEVEL_OUTOF10: 0 - NO PAIN
PAINLEVEL_OUTOF10: 0 - NO PAIN

## 2025-04-03 ASSESSMENT — COLUMBIA-SUICIDE SEVERITY RATING SCALE - C-SSRS
6. HAVE YOU EVER DONE ANYTHING, STARTED TO DO ANYTHING, OR PREPARED TO DO ANYTHING TO END YOUR LIFE?: NO
1. IN THE PAST MONTH, HAVE YOU WISHED YOU WERE DEAD OR WISHED YOU COULD GO TO SLEEP AND NOT WAKE UP?: NO
2. HAVE YOU ACTUALLY HAD ANY THOUGHTS OF KILLING YOURSELF?: NO

## 2025-04-03 NOTE — DISCHARGE INSTRUCTIONS
Patient Instructions after a Colonoscopy      The anesthetics, sedatives or narcotics which were given to you today will be acting in your body for the next 24 hours, so you might feel a little sleepy or groggy.  This feeling should slowly wear off. Carefully read and follow the instructions.     You received sedation today:  - Do not drive or operate any machinery or power tools of any kind.   - No alcoholic beverages today, not even beer or wine.  - Do not make any important decisions or sign any legal documents.  - No over the counter medications that contain alcohol or that may cause drowsiness.  - Do not make any important decisions or sign any legal documents.  - Make sure you have someone with you for first 24 hours.    While it is common to experience mild to moderate abdominal distention, gas, or belching after your procedure, if any of these symptoms occur following discharge from the GI Lab or within one week of having your procedure, call the [facility] to be advised whether a visit to your nearest Urgent Care or Emergency Department is indicated.  Take this paper with you if you go.     - If you develop an allergic reaction to the medications that were given during your procedure such as difficulty breathing, rash, hives, severe nausea, vomiting or lightheadedness.  - If you experience chest pain, shortness of breath, severe abdominal pain, fevers and chills.  -If you develop signs and symptoms of bleeding such as blood in your spit, if your stools turn black, tarry, or bloody  - If you have not urinated within 8 hours following your procedure.  - If your IV site becomes painful, red, inflamed, or looks infected.    If you received a biopsy/polypectomy/sphincterotomy the following instructions apply below:    __ Do not use Aspirin containing products, non-steroidal medications or anti-coagulants for one week following your procedure. (Examples of these types of medications are: Advil,  Arthrotec, Aleve, Coumadin, Ecotrin, Heparin, Ibuprofen, Indocin, Motrin, Naprosyn, Nuprin, Plavix, Vioxx, and Voltarin, or their generic forms.  This list is not all-inclusive.  Check with your physician or pharmacist before resuming medications.)   __ Eat a soft diet today.  Avoid foods that are poorly digested for the next 24 hours.  These foods would include: nuts, beans, lettuce, red meats, and fried foods. Start with liquids and advance your diet as tolerated, gradually work up to eating solids.   __ Do not have a Barium Study or Enema for one week.    Your physician recommends the additional following instructions:    -You have a contact number available for emergencies. The signs and symptoms of potential delayed complications were discussed with you. You may return to normal activities tomorrow.  -Resume your previous diet.  -Continue your present medications.   -We are waiting for your pathology results.  -Your physician has recommended a repeat colonoscopy (date to be determined after pending pathology results are reviewed) for surveillance based on pathology results.  -The findings and recommendations have been discussed with you.  -The findings and recommendations were discussed with your family.  - Please see Medication Reconciliation Form for new medication/medications prescribed.       If you experience any problems or have any questions following discharge from the GI Lab, please call:        Nurse Signature                                                                        Date___________________                                                                            Patient/Responsible Party Signature                                        Date___________________

## 2025-04-03 NOTE — H&P
Pre procedure H&P  Pt feels fine , no complaint  General appearance: Comfortable, no distress  ROS: No SOB  Medications reviewed  Head: Normal  Neck: Soft  Heart: Regular  Lungs: Clear  Abdomen: soft    Impression: clinically doing fine, proceed with procedure    Problem List Items Addressed This Visit       Bariatric surgery status    Relevant Orders    Colonoscopy Diagnostic     Other Visit Diagnoses       Blood in stool        Relevant Orders    Colonoscopy Diagnostic    Abdominal pain, LLQ        Relevant Orders    Colonoscopy Diagnostic    Abdominal pain, RLQ        Relevant Orders    Colonoscopy Diagnostic

## 2025-04-07 ENCOUNTER — TELEPHONE (OUTPATIENT)
Dept: PRIMARY CARE | Facility: CLINIC | Age: 70
End: 2025-04-07

## 2025-04-07 ENCOUNTER — APPOINTMENT (OUTPATIENT)
Dept: PRIMARY CARE | Facility: CLINIC | Age: 70
End: 2025-04-07
Payer: MEDICARE

## 2025-04-07 VITALS
WEIGHT: 162 LBS | SYSTOLIC BLOOD PRESSURE: 130 MMHG | HEART RATE: 71 BPM | DIASTOLIC BLOOD PRESSURE: 80 MMHG | BODY MASS INDEX: 27.66 KG/M2 | HEIGHT: 64 IN

## 2025-04-07 DIAGNOSIS — K64.8 INTERNAL HEMORRHOID: ICD-10-CM

## 2025-04-07 DIAGNOSIS — J30.9 ALLERGIC RHINITIS, UNSPECIFIED SEASONALITY, UNSPECIFIED TRIGGER: ICD-10-CM

## 2025-04-07 DIAGNOSIS — R79.89 ELEVATED VITAMIN B12 LEVEL: ICD-10-CM

## 2025-04-07 DIAGNOSIS — R73.01 IMPAIRED FASTING GLUCOSE: ICD-10-CM

## 2025-04-07 DIAGNOSIS — E78.00 HYPERCHOLESTEROLEMIA: ICD-10-CM

## 2025-04-07 DIAGNOSIS — R53.82 CHRONIC FATIGUE: ICD-10-CM

## 2025-04-07 DIAGNOSIS — E61.1 IRON DEFICIENCY: Primary | ICD-10-CM

## 2025-04-07 DIAGNOSIS — I10 BENIGN ESSENTIAL HTN: ICD-10-CM

## 2025-04-07 PROCEDURE — 1160F RVW MEDS BY RX/DR IN RCRD: CPT | Performed by: INTERNAL MEDICINE

## 2025-04-07 PROCEDURE — 3075F SYST BP GE 130 - 139MM HG: CPT | Performed by: INTERNAL MEDICINE

## 2025-04-07 PROCEDURE — 1159F MED LIST DOCD IN RCRD: CPT | Performed by: INTERNAL MEDICINE

## 2025-04-07 PROCEDURE — 1036F TOBACCO NON-USER: CPT | Performed by: INTERNAL MEDICINE

## 2025-04-07 PROCEDURE — 3008F BODY MASS INDEX DOCD: CPT | Performed by: INTERNAL MEDICINE

## 2025-04-07 PROCEDURE — 3079F DIAST BP 80-89 MM HG: CPT | Performed by: INTERNAL MEDICINE

## 2025-04-07 PROCEDURE — 99214 OFFICE O/P EST MOD 30 MIN: CPT | Performed by: INTERNAL MEDICINE

## 2025-04-07 RX ORDER — EPINEPHRINE 0.3 MG/.3ML
0.3 INJECTION SUBCUTANEOUS EVERY 5 MIN PRN
Status: CANCELLED | OUTPATIENT
Start: 2025-04-07

## 2025-04-07 RX ORDER — MINERAL OIL
180 ENEMA (ML) RECTAL DAILY
Qty: 90 TABLET | Refills: 3 | Status: SHIPPED | OUTPATIENT
Start: 2025-04-07

## 2025-04-07 RX ORDER — DULOXETIN HYDROCHLORIDE 60 MG/1
60 CAPSULE, DELAYED RELEASE ORAL DAILY
COMMUNITY

## 2025-04-07 RX ORDER — FAMOTIDINE 10 MG/ML
20 INJECTION, SOLUTION INTRAVENOUS ONCE AS NEEDED
Status: CANCELLED | OUTPATIENT
Start: 2025-04-07

## 2025-04-07 RX ORDER — ALBUTEROL SULFATE 0.83 MG/ML
3 SOLUTION RESPIRATORY (INHALATION) AS NEEDED
Status: CANCELLED | OUTPATIENT
Start: 2025-04-07

## 2025-04-07 RX ORDER — DIPHENHYDRAMINE HYDROCHLORIDE 50 MG/ML
50 INJECTION, SOLUTION INTRAMUSCULAR; INTRAVENOUS AS NEEDED
Status: CANCELLED | OUTPATIENT
Start: 2025-04-07

## 2025-04-07 RX ORDER — PREDNISONE 20 MG/1
TABLET ORAL
COMMUNITY
Start: 2025-04-04

## 2025-04-07 ASSESSMENT — ENCOUNTER SYMPTOMS
WEAKNESS: 0
DIZZINESS: 0
CARDIOVASCULAR NEGATIVE: 1
DIARRHEA: 0
EYES NEGATIVE: 1
NAUSEA: 0
COUGH: 0
HEADACHES: 0
ABDOMINAL DISTENTION: 0
NEUROLOGICAL NEGATIVE: 1
AGITATION: 0
ENDOCRINE NEGATIVE: 1
RHINORRHEA: 0
DYSURIA: 0
BACK PAIN: 0
PALPITATIONS: 0
ABDOMINAL PAIN: 0
MUSCULOSKELETAL NEGATIVE: 1
SHORTNESS OF BREATH: 0
GASTROINTESTINAL NEGATIVE: 1
FEVER: 0
VOMITING: 0
CONSTIPATION: 0
PSYCHIATRIC NEGATIVE: 1
CHILLS: 0
WHEEZING: 0
LIGHT-HEADEDNESS: 0

## 2025-04-07 NOTE — PROGRESS NOTES
Subjective   Patient ID: Yolette Morrow is a 69 y.o. female who presents for Follow-up (7 WK F/U WITH COLON).  HPI  Colonoscopy F/U. Has chronic fatigue. F/U ON BP AFTER STOPPING THE METOPROLOL , HYPOTENSION IMPROVED .  Review of Systems   Constitutional:  Negative for chills and fever.   HENT: Negative.  Negative for congestion, postnasal drip and rhinorrhea.    Eyes: Negative.  Negative for visual disturbance.   Respiratory:  Negative for cough, shortness of breath and wheezing.    Cardiovascular: Negative.  Negative for chest pain, palpitations and leg swelling.   Gastrointestinal: Negative.  Negative for abdominal distention, abdominal pain, constipation, diarrhea, nausea and vomiting.   Endocrine: Negative.    Genitourinary:  Negative for dysuria and urgency.   Musculoskeletal: Negative.  Negative for back pain.   Skin: Negative.  Negative for rash.   Allergic/Immunologic: Negative for immunocompromised state.   Neurological: Negative.  Negative for dizziness, weakness, light-headedness and headaches.   Psychiatric/Behavioral: Negative.  Negative for agitation.        Objective   Physical Exam  Constitutional:       General: She is not in acute distress.  HENT:      Head: Normocephalic.      Nose: Nose normal.      Mouth/Throat:      Mouth: Mucous membranes are moist.   Eyes:      Conjunctiva/sclera: Conjunctivae normal.      Pupils: Pupils are equal, round, and reactive to light.   Cardiovascular:      Rate and Rhythm: Normal rate and regular rhythm.      Pulses: Normal pulses.      Heart sounds: Normal heart sounds.   Pulmonary:      Effort: No respiratory distress.      Breath sounds: No wheezing.   Chest:      Chest wall: No tenderness.   Abdominal:      General: Abdomen is flat. Bowel sounds are normal.      Palpations: Abdomen is soft.      Tenderness: There is no abdominal tenderness.   Musculoskeletal:         General: No tenderness. Normal range of motion.      Cervical back: Normal range of motion.    Lymphadenopathy:      Cervical: No cervical adenopathy.   Skin:     General: Skin is warm and dry.      Findings: No rash.   Neurological:      General: No focal deficit present.      Mental Status: She is alert. Mental status is at baseline.   Psychiatric:         Mood and Affect: Mood normal.         Behavior: Behavior normal.         Assessment/Plan   1. Iron deficiency  Ferritin    Iron and TIBC    CBC and Auto Differential    Ferritin    Iron and TIBC    CBC and Auto Differential      2. Allergic rhinitis, unspecified seasonality, unspecified trigger  fexofenadine (Allegra) 180 mg tablet      3. Benign essential HTN  Comprehensive Metabolic Panel    Comprehensive Metabolic Panel      4. Elevated vitamin B12 level  Vitamin B12    Vitamin B12      5. Impaired fasting glucose  Comprehensive Metabolic Panel    Hemoglobin A1C    Comprehensive Metabolic Panel    Hemoglobin A1C      6. Hypercholesterolemia  Lipid Panel    Lipid Panel      7. Internal hemorrhoid        8. Chronic fatigue          TEST RESULTS WERE DISCUSSED.  WILL DO IRON INFUSION,SINCE CAN'T TOLERATE THE PO IRON.  ADVISED TO HAVE LOW FAT AND LOW CALORIE , HIGH IRON DIET AND TO LOOSE WEIGHT, DAILY EXERCISE.  I HIGHLY ADVISED TO STOP THE OTC VIT B12., TO ADD MORE FIBER TO DIET.    MDM    1) COMPLEXITY: MORE THAN 1 STABLE CHRONIC CONDITION ADDRESSED  2)DATA: TESTS INTERPRETED AND OR ORDERED, TOOK INDEPENDENT HISTORY OR RECORDS REVIEWED  3)RISK: MODERATE RISK DUE TO NATURE OF MEDICAL CONDITIONS/COMORBIDITY OR MEDICATIONS ORDERED OR SURGICAL OR PROCEDURE REFERRAL, .           3 MON

## 2025-04-07 NOTE — TELEPHONE ENCOUNTER
I PLACED THE ORDER FOR IRON INFUSION AFTER PT LEFT FOR DX OF IRON DEFICIENCY. PT KNOWS ABOUT THE PLAN . PLEASE SET IT UP AND INFORM HER.

## 2025-04-09 LAB
LABORATORY COMMENT REPORT: NORMAL
PATH REPORT.FINAL DX SPEC: NORMAL
PATH REPORT.GROSS SPEC: NORMAL
PATH REPORT.RELEVANT HX SPEC: NORMAL
PATH REPORT.TOTAL CANCER: NORMAL

## 2025-04-11 ENCOUNTER — INFUSION (OUTPATIENT)
Dept: HEMATOLOGY/ONCOLOGY | Facility: CLINIC | Age: 70
End: 2025-04-11
Payer: MEDICARE

## 2025-04-11 VITALS
RESPIRATION RATE: 18 BRPM | OXYGEN SATURATION: 98 % | TEMPERATURE: 96.1 F | DIASTOLIC BLOOD PRESSURE: 77 MMHG | HEART RATE: 72 BPM | SYSTOLIC BLOOD PRESSURE: 129 MMHG

## 2025-04-11 DIAGNOSIS — D50.8 OTHER IRON DEFICIENCY ANEMIA: ICD-10-CM

## 2025-04-11 DIAGNOSIS — E61.1 IRON DEFICIENCY: ICD-10-CM

## 2025-04-11 PROCEDURE — 96374 THER/PROPH/DIAG INJ IV PUSH: CPT

## 2025-04-11 PROCEDURE — 2500000004 HC RX 250 GENERAL PHARMACY W/ HCPCS (ALT 636 FOR OP/ED): Performed by: INTERNAL MEDICINE

## 2025-04-11 RX ORDER — EPINEPHRINE 0.3 MG/.3ML
0.3 INJECTION SUBCUTANEOUS EVERY 5 MIN PRN
OUTPATIENT
Start: 2025-04-14

## 2025-04-11 RX ORDER — DIPHENHYDRAMINE HYDROCHLORIDE 50 MG/ML
50 INJECTION, SOLUTION INTRAMUSCULAR; INTRAVENOUS AS NEEDED
OUTPATIENT
Start: 2025-04-14

## 2025-04-11 RX ORDER — ALBUTEROL SULFATE 0.83 MG/ML
3 SOLUTION RESPIRATORY (INHALATION) AS NEEDED
OUTPATIENT
Start: 2025-04-14

## 2025-04-11 RX ORDER — FAMOTIDINE 10 MG/ML
20 INJECTION, SOLUTION INTRAVENOUS ONCE AS NEEDED
OUTPATIENT
Start: 2025-04-14

## 2025-04-11 RX ADMIN — IRON SUCROSE 200 MG: 20 INJECTION, SOLUTION INTRAVENOUS at 13:55

## 2025-04-11 ASSESSMENT — PAIN SCALES - GENERAL: PAINLEVEL_OUTOF10: 8

## 2025-04-15 ENCOUNTER — INFUSION (OUTPATIENT)
Dept: HEMATOLOGY/ONCOLOGY | Facility: CLINIC | Age: 70
End: 2025-04-15
Payer: MEDICARE

## 2025-04-15 VITALS
DIASTOLIC BLOOD PRESSURE: 77 MMHG | OXYGEN SATURATION: 98 % | TEMPERATURE: 97 F | RESPIRATION RATE: 18 BRPM | HEART RATE: 88 BPM | SYSTOLIC BLOOD PRESSURE: 138 MMHG

## 2025-04-15 DIAGNOSIS — E61.1 IRON DEFICIENCY: ICD-10-CM

## 2025-04-15 DIAGNOSIS — D50.8 OTHER IRON DEFICIENCY ANEMIA: ICD-10-CM

## 2025-04-15 PROCEDURE — 2500000004 HC RX 250 GENERAL PHARMACY W/ HCPCS (ALT 636 FOR OP/ED)

## 2025-04-15 PROCEDURE — 96374 THER/PROPH/DIAG INJ IV PUSH: CPT

## 2025-04-15 RX ORDER — DIPHENHYDRAMINE HYDROCHLORIDE 50 MG/ML
50 INJECTION, SOLUTION INTRAMUSCULAR; INTRAVENOUS AS NEEDED
Status: CANCELLED | OUTPATIENT
Start: 2025-04-17

## 2025-04-15 RX ORDER — FAMOTIDINE 10 MG/ML
20 INJECTION, SOLUTION INTRAVENOUS ONCE AS NEEDED
Status: CANCELLED | OUTPATIENT
Start: 2025-04-17

## 2025-04-15 RX ORDER — ALBUTEROL SULFATE 0.83 MG/ML
3 SOLUTION RESPIRATORY (INHALATION) AS NEEDED
Status: CANCELLED | OUTPATIENT
Start: 2025-04-17

## 2025-04-15 RX ORDER — EPINEPHRINE 0.3 MG/.3ML
0.3 INJECTION SUBCUTANEOUS EVERY 5 MIN PRN
Status: CANCELLED | OUTPATIENT
Start: 2025-04-17

## 2025-04-15 RX ADMIN — IRON SUCROSE 200 MG: 20 INJECTION, SOLUTION INTRAVENOUS at 14:22

## 2025-04-15 ASSESSMENT — PAIN SCALES - GENERAL: PAINLEVEL_OUTOF10: 0-NO PAIN

## 2025-04-17 ENCOUNTER — TELEPHONE (OUTPATIENT)
Dept: PRIMARY CARE | Facility: CLINIC | Age: 70
End: 2025-04-17
Payer: MEDICARE

## 2025-04-17 NOTE — TELEPHONE ENCOUNTER
PATIENT CALLED - C/O IRON INFUSIONS CAUSING UPSET STOMACH, BLOATING, DIARRHEA, AND OVERALL JUST NOT FEELING WELL. SHE QUESTIONS IF THERE IS SOMETHING ELSE SHE CAN DO INSTEAD OF THE INFUSIONS OR IF THERE IS A DIFFERENT FORM OF IRON THAT CAN BE ORDERED FOR THE INFUSION. PLEASE ADVISE.

## 2025-04-17 NOTE — TELEPHONE ENCOUNTER
PATIENT NOTIFIED AND UNDERSTOOD. SHE IS GOING TO PROCEED WITH ANOTHER IRON INFUSION AND SEE IF SHE HAS THE SYMPTOMS AGAIN (SHE DIDN'T HAVE SYMPTOMS AFTER THE FIRST INFUSION, JUST THE SECOND ONE). IF SHE EXPERIENCES SYMPTOMS AGAIN AFTER THIS NEXT INFUSION THEN SHE WILL SCHEDULE AN APPOINTMENT TO FURTHER EVALUATE AND DISCUSS OPTIONS.

## 2025-04-18 ENCOUNTER — INFUSION (OUTPATIENT)
Dept: HEMATOLOGY/ONCOLOGY | Facility: CLINIC | Age: 70
End: 2025-04-18
Payer: MEDICARE

## 2025-04-18 VITALS
RESPIRATION RATE: 76 BRPM | OXYGEN SATURATION: 96 % | TEMPERATURE: 97.7 F | HEART RATE: 76 BPM | DIASTOLIC BLOOD PRESSURE: 78 MMHG | SYSTOLIC BLOOD PRESSURE: 122 MMHG

## 2025-04-18 DIAGNOSIS — E61.1 IRON DEFICIENCY: ICD-10-CM

## 2025-04-18 DIAGNOSIS — D50.8 OTHER IRON DEFICIENCY ANEMIA: ICD-10-CM

## 2025-04-18 PROCEDURE — 2500000004 HC RX 250 GENERAL PHARMACY W/ HCPCS (ALT 636 FOR OP/ED): Mod: JZ | Performed by: INTERNAL MEDICINE

## 2025-04-18 PROCEDURE — 96374 THER/PROPH/DIAG INJ IV PUSH: CPT

## 2025-04-18 RX ORDER — FAMOTIDINE 10 MG/ML
20 INJECTION, SOLUTION INTRAVENOUS ONCE AS NEEDED
OUTPATIENT
Start: 2025-04-21

## 2025-04-18 RX ORDER — DIPHENHYDRAMINE HYDROCHLORIDE 50 MG/ML
50 INJECTION, SOLUTION INTRAMUSCULAR; INTRAVENOUS AS NEEDED
OUTPATIENT
Start: 2025-04-21

## 2025-04-18 RX ORDER — EPINEPHRINE 0.3 MG/.3ML
0.3 INJECTION SUBCUTANEOUS EVERY 5 MIN PRN
OUTPATIENT
Start: 2025-04-21

## 2025-04-18 RX ORDER — ALBUTEROL SULFATE 0.83 MG/ML
3 SOLUTION RESPIRATORY (INHALATION) AS NEEDED
OUTPATIENT
Start: 2025-04-21

## 2025-04-18 RX ADMIN — IRON SUCROSE 200 MG: 20 INJECTION, SOLUTION INTRAVENOUS at 10:40

## 2025-04-18 ASSESSMENT — PAIN SCALES - GENERAL: PAINLEVEL_OUTOF10: 0-NO PAIN

## 2025-04-22 ENCOUNTER — INFUSION (OUTPATIENT)
Dept: HEMATOLOGY/ONCOLOGY | Facility: CLINIC | Age: 70
End: 2025-04-22
Payer: MEDICARE

## 2025-04-22 VITALS
DIASTOLIC BLOOD PRESSURE: 65 MMHG | OXYGEN SATURATION: 97 % | RESPIRATION RATE: 16 BRPM | TEMPERATURE: 97.7 F | HEART RATE: 85 BPM | SYSTOLIC BLOOD PRESSURE: 95 MMHG

## 2025-04-22 DIAGNOSIS — D50.8 OTHER IRON DEFICIENCY ANEMIA: ICD-10-CM

## 2025-04-22 DIAGNOSIS — E61.1 IRON DEFICIENCY: ICD-10-CM

## 2025-04-22 PROCEDURE — 96374 THER/PROPH/DIAG INJ IV PUSH: CPT

## 2025-04-22 PROCEDURE — 2500000004 HC RX 250 GENERAL PHARMACY W/ HCPCS (ALT 636 FOR OP/ED): Mod: JZ

## 2025-04-22 RX ORDER — DIPHENHYDRAMINE HYDROCHLORIDE 50 MG/ML
50 INJECTION, SOLUTION INTRAMUSCULAR; INTRAVENOUS AS NEEDED
Status: CANCELLED | OUTPATIENT
Start: 2025-04-24

## 2025-04-22 RX ORDER — DIPHENHYDRAMINE HYDROCHLORIDE 50 MG/ML
50 INJECTION, SOLUTION INTRAMUSCULAR; INTRAVENOUS AS NEEDED
Status: CANCELLED | OUTPATIENT
Start: 2025-04-25

## 2025-04-22 RX ORDER — ALBUTEROL SULFATE 0.83 MG/ML
3 SOLUTION RESPIRATORY (INHALATION) AS NEEDED
Status: CANCELLED | OUTPATIENT
Start: 2025-04-25

## 2025-04-22 RX ORDER — EPINEPHRINE 0.3 MG/.3ML
0.3 INJECTION SUBCUTANEOUS EVERY 5 MIN PRN
Status: CANCELLED | OUTPATIENT
Start: 2025-04-24

## 2025-04-22 RX ORDER — ALBUTEROL SULFATE 0.83 MG/ML
3 SOLUTION RESPIRATORY (INHALATION) AS NEEDED
Status: CANCELLED | OUTPATIENT
Start: 2025-04-24

## 2025-04-22 RX ORDER — EPINEPHRINE 0.3 MG/.3ML
0.3 INJECTION SUBCUTANEOUS EVERY 5 MIN PRN
Status: CANCELLED | OUTPATIENT
Start: 2025-04-25

## 2025-04-22 RX ORDER — FAMOTIDINE 10 MG/ML
20 INJECTION, SOLUTION INTRAVENOUS ONCE AS NEEDED
Status: CANCELLED | OUTPATIENT
Start: 2025-04-25

## 2025-04-22 RX ORDER — FAMOTIDINE 10 MG/ML
20 INJECTION, SOLUTION INTRAVENOUS ONCE AS NEEDED
Status: CANCELLED | OUTPATIENT
Start: 2025-04-24

## 2025-04-22 RX ADMIN — IRON SUCROSE 200 MG: 20 INJECTION, SOLUTION INTRAVENOUS at 09:16

## 2025-04-22 ASSESSMENT — PAIN SCALES - GENERAL: PAINLEVEL_OUTOF10: 0-NO PAIN

## 2025-04-25 ENCOUNTER — INFUSION (OUTPATIENT)
Dept: HEMATOLOGY/ONCOLOGY | Facility: CLINIC | Age: 70
End: 2025-04-25
Payer: MEDICARE

## 2025-04-25 VITALS
TEMPERATURE: 97.5 F | RESPIRATION RATE: 18 BRPM | DIASTOLIC BLOOD PRESSURE: 73 MMHG | SYSTOLIC BLOOD PRESSURE: 113 MMHG | HEART RATE: 73 BPM | OXYGEN SATURATION: 98 %

## 2025-04-25 DIAGNOSIS — D50.8 OTHER IRON DEFICIENCY ANEMIA: ICD-10-CM

## 2025-04-25 DIAGNOSIS — E61.1 IRON DEFICIENCY: Primary | ICD-10-CM

## 2025-04-25 PROCEDURE — 2500000004 HC RX 250 GENERAL PHARMACY W/ HCPCS (ALT 636 FOR OP/ED): Mod: JZ | Performed by: INTERNAL MEDICINE

## 2025-04-25 PROCEDURE — 96374 THER/PROPH/DIAG INJ IV PUSH: CPT

## 2025-04-25 RX ORDER — FAMOTIDINE 10 MG/ML
20 INJECTION, SOLUTION INTRAVENOUS ONCE AS NEEDED
OUTPATIENT
Start: 2025-04-25

## 2025-04-25 RX ORDER — ALBUTEROL SULFATE 0.83 MG/ML
3 SOLUTION RESPIRATORY (INHALATION) AS NEEDED
OUTPATIENT
Start: 2025-04-25

## 2025-04-25 RX ORDER — EPINEPHRINE 0.3 MG/.3ML
0.3 INJECTION SUBCUTANEOUS EVERY 5 MIN PRN
OUTPATIENT
Start: 2025-04-25

## 2025-04-25 RX ORDER — DIPHENHYDRAMINE HYDROCHLORIDE 50 MG/ML
50 INJECTION, SOLUTION INTRAMUSCULAR; INTRAVENOUS AS NEEDED
OUTPATIENT
Start: 2025-04-25

## 2025-04-25 RX ADMIN — IRON SUCROSE 200 MG: 20 INJECTION, SOLUTION INTRAVENOUS at 08:23

## 2025-04-25 ASSESSMENT — PAIN SCALES - GENERAL: PAINLEVEL_OUTOF10: 0-NO PAIN

## 2025-05-13 ENCOUNTER — TELEPHONE (OUTPATIENT)
Dept: PRIMARY CARE | Facility: CLINIC | Age: 70
End: 2025-05-13
Payer: MEDICARE

## 2025-05-13 NOTE — TELEPHONE ENCOUNTER
PATIENT RECEIVED A BILL FROM [employer] FOR $300.00 FOR B 12 AND FOLIC ACID DONE IN FEB 2025.  I WAS UNABLE TO SEE WHAT CODES WE USED as ONCE LABS ARE DRAWN THE DX CODE DISAPPEARS FROM THE ORDERS.    I EMAILED [employer] WITH THE FOLLOWING CODES TO [employer] AND SEE IF THEY WILL COVER THEM  D64.9, E61.1, D51.0, I10 AND R68.89    THANKS

## 2025-06-16 ENCOUNTER — HOSPITAL ENCOUNTER (EMERGENCY)
Facility: HOSPITAL | Age: 70
Discharge: HOME | End: 2025-06-16
Payer: MEDICARE

## 2025-06-16 ENCOUNTER — TELEPHONE (OUTPATIENT)
Dept: PRIMARY CARE | Facility: CLINIC | Age: 70
End: 2025-06-16
Payer: MEDICARE

## 2025-06-16 ENCOUNTER — APPOINTMENT (OUTPATIENT)
Dept: RADIOLOGY | Facility: HOSPITAL | Age: 70
End: 2025-06-16
Payer: MEDICARE

## 2025-06-16 VITALS
OXYGEN SATURATION: 98 % | HEART RATE: 68 BPM | DIASTOLIC BLOOD PRESSURE: 84 MMHG | SYSTOLIC BLOOD PRESSURE: 135 MMHG | RESPIRATION RATE: 20 BRPM | TEMPERATURE: 97.2 F | WEIGHT: 162 LBS | BODY MASS INDEX: 27.81 KG/M2

## 2025-06-16 DIAGNOSIS — R11.0 NAUSEA: ICD-10-CM

## 2025-06-16 DIAGNOSIS — R19.7 DIARRHEA, UNSPECIFIED TYPE: Primary | ICD-10-CM

## 2025-06-16 DIAGNOSIS — R14.0 ABDOMINAL BLOATING: ICD-10-CM

## 2025-06-16 LAB
ALBUMIN SERPL BCP-MCNC: 4.4 G/DL (ref 3.4–5)
ALP SERPL-CCNC: 56 U/L (ref 33–136)
ALT SERPL W P-5'-P-CCNC: 84 U/L (ref 7–45)
ANION GAP SERPL CALC-SCNC: 9 MMOL/L (ref 10–20)
APPEARANCE UR: CLEAR
AST SERPL W P-5'-P-CCNC: 50 U/L (ref 9–39)
BASOPHILS # BLD AUTO: 0.05 X10*3/UL (ref 0–0.1)
BASOPHILS NFR BLD AUTO: 0.8 %
BILIRUB DIRECT SERPL-MCNC: 0.1 MG/DL (ref 0–0.3)
BILIRUB SERPL-MCNC: 0.5 MG/DL (ref 0–1.2)
BILIRUB UR STRIP.AUTO-MCNC: NEGATIVE MG/DL
BUN SERPL-MCNC: 15 MG/DL (ref 6–23)
CALCIUM SERPL-MCNC: 9 MG/DL (ref 8.6–10.3)
CHLORIDE SERPL-SCNC: 100 MMOL/L (ref 98–107)
CO2 SERPL-SCNC: 31 MMOL/L (ref 21–32)
COLOR UR: COLORLESS
CREAT SERPL-MCNC: 0.66 MG/DL (ref 0.5–1.05)
EGFRCR SERPLBLD CKD-EPI 2021: >90 ML/MIN/1.73M*2
EOSINOPHIL # BLD AUTO: 0.22 X10*3/UL (ref 0–0.7)
EOSINOPHIL NFR BLD AUTO: 3.5 %
ERYTHROCYTE [DISTWIDTH] IN BLOOD BY AUTOMATED COUNT: 14.6 % (ref 11.5–14.5)
GLUCOSE SERPL-MCNC: 91 MG/DL (ref 74–99)
GLUCOSE UR STRIP.AUTO-MCNC: NORMAL MG/DL
HCT VFR BLD AUTO: 41.1 % (ref 36–46)
HGB BLD-MCNC: 13.4 G/DL (ref 12–16)
HOLD SPECIMEN: NORMAL
IMM GRANULOCYTES # BLD AUTO: 0.02 X10*3/UL (ref 0–0.7)
IMM GRANULOCYTES NFR BLD AUTO: 0.3 % (ref 0–0.9)
KETONES UR STRIP.AUTO-MCNC: NEGATIVE MG/DL
LEUKOCYTE ESTERASE UR QL STRIP.AUTO: NEGATIVE
LIPASE SERPL-CCNC: 39 U/L (ref 9–82)
LYMPHOCYTES # BLD AUTO: 2.29 X10*3/UL (ref 1.2–4.8)
LYMPHOCYTES NFR BLD AUTO: 35.9 %
MCH RBC QN AUTO: 29.5 PG (ref 26–34)
MCHC RBC AUTO-ENTMCNC: 32.6 G/DL (ref 32–36)
MCV RBC AUTO: 90 FL (ref 80–100)
MONOCYTES # BLD AUTO: 0.66 X10*3/UL (ref 0.1–1)
MONOCYTES NFR BLD AUTO: 10.4 %
NEUTROPHILS # BLD AUTO: 3.13 X10*3/UL (ref 1.2–7.7)
NEUTROPHILS NFR BLD AUTO: 49.1 %
NITRITE UR QL STRIP.AUTO: NEGATIVE
NRBC BLD-RTO: 0 /100 WBCS (ref 0–0)
PH UR STRIP.AUTO: 7 [PH]
PLATELET # BLD AUTO: 223 X10*3/UL (ref 150–450)
POTASSIUM SERPL-SCNC: 3.7 MMOL/L (ref 3.5–5.3)
PROT SERPL-MCNC: 6.4 G/DL (ref 6.4–8.2)
PROT UR STRIP.AUTO-MCNC: NEGATIVE MG/DL
RBC # BLD AUTO: 4.55 X10*6/UL (ref 4–5.2)
RBC # UR STRIP.AUTO: NEGATIVE MG/DL
SODIUM SERPL-SCNC: 136 MMOL/L (ref 136–145)
SP GR UR STRIP.AUTO: 1.01
UROBILINOGEN UR STRIP.AUTO-MCNC: NORMAL MG/DL
WBC # BLD AUTO: 6.4 X10*3/UL (ref 4.4–11.3)

## 2025-06-16 PROCEDURE — 82248 BILIRUBIN DIRECT: CPT | Performed by: PHYSICIAN ASSISTANT

## 2025-06-16 PROCEDURE — 83690 ASSAY OF LIPASE: CPT | Performed by: PHYSICIAN ASSISTANT

## 2025-06-16 PROCEDURE — 74177 CT ABD & PELVIS W/CONTRAST: CPT | Performed by: RADIOLOGY

## 2025-06-16 PROCEDURE — 2550000001 HC RX 255 CONTRASTS: Performed by: PHYSICIAN ASSISTANT

## 2025-06-16 PROCEDURE — 36415 COLL VENOUS BLD VENIPUNCTURE: CPT | Performed by: PHYSICIAN ASSISTANT

## 2025-06-16 PROCEDURE — 99285 EMERGENCY DEPT VISIT HI MDM: CPT | Mod: 25

## 2025-06-16 PROCEDURE — 85025 COMPLETE CBC W/AUTO DIFF WBC: CPT | Performed by: PHYSICIAN ASSISTANT

## 2025-06-16 PROCEDURE — 74177 CT ABD & PELVIS W/CONTRAST: CPT

## 2025-06-16 PROCEDURE — 2500000004 HC RX 250 GENERAL PHARMACY W/ HCPCS (ALT 636 FOR OP/ED): Performed by: PHYSICIAN ASSISTANT

## 2025-06-16 PROCEDURE — 96360 HYDRATION IV INFUSION INIT: CPT

## 2025-06-16 PROCEDURE — 81003 URINALYSIS AUTO W/O SCOPE: CPT | Performed by: PHYSICIAN ASSISTANT

## 2025-06-16 PROCEDURE — 80053 COMPREHEN METABOLIC PANEL: CPT | Performed by: PHYSICIAN ASSISTANT

## 2025-06-16 RX ADMIN — SODIUM CHLORIDE 500 ML: 9 INJECTION, SOLUTION INTRAVENOUS at 10:43

## 2025-06-16 RX ADMIN — IOHEXOL 70 ML: 350 INJECTION, SOLUTION INTRAVENOUS at 11:29

## 2025-06-16 ASSESSMENT — ENCOUNTER SYMPTOMS
ARTHRALGIAS: 0
CHILLS: 0
SEIZURES: 0
VOMITING: 0
EYE PAIN: 0
COLOR CHANGE: 0
COUGH: 0
NAUSEA: 1
SHORTNESS OF BREATH: 0
DIARRHEA: 1
DYSURIA: 0
HEMATURIA: 0
FEVER: 0
PALPITATIONS: 0
SORE THROAT: 0
BACK PAIN: 0
ABDOMINAL PAIN: 1

## 2025-06-16 ASSESSMENT — PAIN - FUNCTIONAL ASSESSMENT: PAIN_FUNCTIONAL_ASSESSMENT: 0-10

## 2025-06-16 NOTE — TELEPHONE ENCOUNTER
"PATIENT CALLED C/O DARK/BLACK DIARRHEA THAT IS TARRY/STICKY WITH VERY STRONG \"BAD\" ODOR. SYMPTOMS STARTED 6/11/25 WHILE SHE WAS ON VACATION AND IS ACCOMPANIED WITH NAUSEA AND STOMACH PAIN. PATIENT ADVISED TO GO TO ER ASAP FOR EVALUATION. PATIENT UNDERSTOOD AND WAS AGREEABLE TO GO TO ER.   "

## 2025-06-16 NOTE — ED PROVIDER NOTES
Patient is a 69-year-old female who presents to the emergency room with a chief complaint of diarrhea.  She states that she has had diarrhea for the past 11 days.  She reports that its dark in color and has a foul odor.  Patient states that she has taken Pepto-Bismol but reports that it was dark prior to the Pepto-Bismol.  She reports that she is nauseous and has bloating and occasional left lower abdominal pain.  She denies any fever or chills.  No vomiting.  She denies being on a blood thinner.  No bright red stool per rectum.  Patient reports that she has had a gastric sleeve in the past and has to get B12 injections and also iron infusions.  Her last iron infusion was in April.  No chest pain or shortness of breath.           Review of Systems   Constitutional:  Negative for chills and fever.   HENT:  Negative for ear pain and sore throat.    Eyes:  Negative for pain and visual disturbance.   Respiratory:  Negative for cough and shortness of breath.    Cardiovascular:  Negative for chest pain and palpitations.   Gastrointestinal:  Positive for abdominal pain, diarrhea and nausea. Negative for vomiting.   Genitourinary:  Negative for dysuria and hematuria.   Musculoskeletal:  Negative for arthralgias and back pain.   Skin:  Negative for color change and rash.   Neurological:  Negative for seizures and syncope.   All other systems reviewed and are negative.       Physical Exam  Vitals and nursing note reviewed.   Constitutional:       General: She is not in acute distress.     Appearance: Normal appearance. She is well-developed.   HENT:      Head: Normocephalic and atraumatic.      Mouth/Throat:      Pharynx: No oropharyngeal exudate or posterior oropharyngeal erythema.   Eyes:      Extraocular Movements: Extraocular movements intact.      Conjunctiva/sclera: Conjunctivae normal.   Cardiovascular:      Rate and Rhythm: Normal rate and regular rhythm.      Heart sounds: No murmur heard.  Pulmonary:      Effort:  Pulmonary effort is normal. No respiratory distress.      Breath sounds: Normal breath sounds. No stridor. No wheezing, rhonchi or rales.   Abdominal:      General: There is no distension.      Palpations: Abdomen is soft. There is no mass.      Tenderness: There is abdominal tenderness (LLQ). There is no guarding or rebound.      Hernia: No hernia is present.   Musculoskeletal:         General: No swelling.      Cervical back: Normal range of motion and neck supple. No rigidity.   Skin:     General: Skin is warm and dry.      Capillary Refill: Capillary refill takes less than 2 seconds.   Neurological:      General: No focal deficit present.      Mental Status: She is alert and oriented to person, place, and time.   Psychiatric:         Mood and Affect: Mood normal.          Labs Reviewed   CBC WITH AUTO DIFFERENTIAL - Abnormal       Result Value    WBC 6.4      nRBC 0.0      RBC 4.55      Hemoglobin 13.4      Hematocrit 41.1      MCV 90      MCH 29.5      MCHC 32.6      RDW 14.6 (*)     Platelets 223      Neutrophils % 49.1      Immature Granulocytes %, Automated 0.3      Lymphocytes % 35.9      Monocytes % 10.4      Eosinophils % 3.5      Basophils % 0.8      Neutrophils Absolute 3.13      Immature Granulocytes Absolute, Automated 0.02      Lymphocytes Absolute 2.29      Monocytes Absolute 0.66      Eosinophils Absolute 0.22      Basophils Absolute 0.05     BASIC METABOLIC PANEL - Abnormal    Glucose 91      Sodium 136      Potassium 3.7      Chloride 100      Bicarbonate 31      Anion Gap 9 (*)     Urea Nitrogen 15      Creatinine 0.66      eGFR >90      Calcium 9.0     HEPATIC FUNCTION PANEL - Abnormal    Albumin 4.4      Bilirubin, Total 0.5      Bilirubin, Direct 0.1      Alkaline Phosphatase 56      ALT 84 (*)     AST 50 (*)     Total Protein 6.4     URINALYSIS WITH REFLEX CULTURE AND MICROSCOPIC - Abnormal    Color, Urine Colorless (*)     Appearance, Urine Clear      Specific Gravity, Urine 1.006      pH,  Urine 7.0      Protein, Urine NEGATIVE      Glucose, Urine Normal      Blood, Urine NEGATIVE      Ketones, Urine NEGATIVE      Bilirubin, Urine NEGATIVE      Urobilinogen, Urine Normal      Nitrite, Urine NEGATIVE      Leukocyte Esterase, Urine NEGATIVE     LIPASE - Normal    Lipase 39      Narrative:     Venipuncture immediately after or during the administration of Metamizole may lead to falsely low results. Testing should be performed immediately prior to Metamizole dosing.   C. DIFFICILE, PCR   STOOL PATHOGEN PANEL, PCR   FECAL OCCULT BLOOD IMMUNOASSAY   URINALYSIS WITH REFLEX CULTURE AND MICROSCOPIC    Narrative:     The following orders were created for panel order Urinalysis with Reflex Culture and Microscopic.  Procedure                               Abnormality         Status                     ---------                               -----------         ------                     Urinalysis with Reflex C...[[id number]]  Abnormal            Final result               Extra Urine Gray Tube[[id number]]                            In process                   Please view results for these tests on the individual orders.   EXTRA URINE GRAY TUBE        CT abdomen pelvis w IV contrast   Final Result   No acute intra-abdominal process.             Signed by: Nick Kennedy 6/16/2025 11:42 AM   Dictation workstation:   [id number]           Procedures     Medical Decision Making  Patient is a 69-year-old female who presents to the emergency room with a chief complaint of diarrhea for the past 11 days she states that this occurs after she eats or drinks.  She states that her stool has been dark in color, does admit to using Pepto-Bismol although she states that prior to using the Pepto-Bismol it was dark in color as well.  She reported some abdominal discomfort.  CT scan of the abdomen pelvis is negative for acute process.  Lab work is also overall unremarkable with a stable hemoglobin and hematocrit, actually better  than her previous lab work in February.  Patient was unable to provide a stool sample while here in the emergency department.  She was discharged home with recommended follow-up with her primary care physician in which she states that she has an appointment tomorrow and return for any new or worsening symptoms.      Differential diagnosis includes but not limited to gastroenteritis, infectious diarrhea, C. difficile, colitis, GI bleed    Amount and/or Complexity of Data Reviewed  Labs: ordered. Decision-making details documented in ED Course.  Radiology: ordered. Decision-making details documented in ED Course.         Diagnoses as of 06/16/25 1526   Diarrhea, unspecified type   Nausea   Abdominal bloating                    Elayne Damon PA-C  06/16/25 1526

## 2025-06-17 ENCOUNTER — OFFICE VISIT (OUTPATIENT)
Dept: PRIMARY CARE | Facility: CLINIC | Age: 70
End: 2025-06-17
Payer: MEDICARE

## 2025-06-17 VITALS
SYSTOLIC BLOOD PRESSURE: 129 MMHG | WEIGHT: 160 LBS | HEART RATE: 87 BPM | HEIGHT: 64 IN | DIASTOLIC BLOOD PRESSURE: 85 MMHG | BODY MASS INDEX: 27.31 KG/M2

## 2025-06-17 DIAGNOSIS — E61.1 IRON DEFICIENCY: ICD-10-CM

## 2025-06-17 DIAGNOSIS — K92.1 BLACK STOOL: Primary | ICD-10-CM

## 2025-06-17 DIAGNOSIS — R10.13 EPIGASTRIC ABDOMINAL PAIN: ICD-10-CM

## 2025-06-17 DIAGNOSIS — R74.8 ABNORMAL LIVER ENZYMES: ICD-10-CM

## 2025-06-17 DIAGNOSIS — D50.8 OTHER IRON DEFICIENCY ANEMIA: ICD-10-CM

## 2025-06-17 DIAGNOSIS — R79.89 ELEVATED VITAMIN B12 LEVEL: ICD-10-CM

## 2025-06-17 DIAGNOSIS — Z98.84 HISTORY OF WEIGHT LOSS SURGERY: ICD-10-CM

## 2025-06-17 DIAGNOSIS — R19.7 DIARRHEA, UNSPECIFIED TYPE: ICD-10-CM

## 2025-06-17 PROCEDURE — 3079F DIAST BP 80-89 MM HG: CPT | Performed by: INTERNAL MEDICINE

## 2025-06-17 PROCEDURE — 3008F BODY MASS INDEX DOCD: CPT | Performed by: INTERNAL MEDICINE

## 2025-06-17 PROCEDURE — 1159F MED LIST DOCD IN RCRD: CPT | Performed by: INTERNAL MEDICINE

## 2025-06-17 PROCEDURE — 1160F RVW MEDS BY RX/DR IN RCRD: CPT | Performed by: INTERNAL MEDICINE

## 2025-06-17 PROCEDURE — 99214 OFFICE O/P EST MOD 30 MIN: CPT | Performed by: INTERNAL MEDICINE

## 2025-06-17 PROCEDURE — 3074F SYST BP LT 130 MM HG: CPT | Performed by: INTERNAL MEDICINE

## 2025-06-17 PROCEDURE — 1036F TOBACCO NON-USER: CPT | Performed by: INTERNAL MEDICINE

## 2025-06-17 RX ORDER — METRONIDAZOLE 500 MG/1
500 TABLET ORAL 3 TIMES DAILY
Qty: 21 TABLET | Refills: 0 | Status: SHIPPED | OUTPATIENT
Start: 2025-06-17 | End: 2025-06-24

## 2025-06-17 ASSESSMENT — ENCOUNTER SYMPTOMS
NEUROLOGICAL NEGATIVE: 1
COUGH: 0
FEVER: 0
LIGHT-HEADEDNESS: 0
CONSTIPATION: 0
RHINORRHEA: 0
ABDOMINAL PAIN: 1
SHORTNESS OF BREATH: 0
HEADACHES: 0
ENDOCRINE NEGATIVE: 1
BACK PAIN: 0
CHILLS: 0
DIARRHEA: 1
MUSCULOSKELETAL NEGATIVE: 1
AGITATION: 0
CARDIOVASCULAR NEGATIVE: 1
DIZZINESS: 0
PALPITATIONS: 0
PSYCHIATRIC NEGATIVE: 1
DYSURIA: 0
WEAKNESS: 0
ABDOMINAL DISTENTION: 0
NAUSEA: 1
VOMITING: 0
EYES NEGATIVE: 1
WHEEZING: 0

## 2025-06-17 NOTE — PROGRESS NOTES
Subjective   Patient ID: Yolette Morrow is a 69 y.o. female who presents for ER Follow-up (F/U ER VISIT 6/16/25 - DARK TARRY STOOLS. SYMPTOMS STARTED 12 DAYS AGO - ABDOMINAL DISCOMFORT, LOW APPETITE, NAUSEA. ).  HPI  F/U AFTER ER VISIT YESTERDAY FOR DIARRHEA WITH DARK BLACK STOOL AND ABDOMINAL PAIN. HAD LABS , CT OF ABDOMEN AND PELVIS, PENDING STOOL TEST. SYMPTOMS HAVE BEEN GOING ON FOR ALMOST 2 WEEKS WITH NAUSEA . JUST CAME BACK FROM VACATION, BUT FOLLOWED HER ROUTINE DIET DURING THE TRIP. FINISHED IRON INFUSION 2 MONTHS AGO.  Review of Systems   Constitutional:  Negative for chills and fever.   HENT: Negative.  Negative for congestion, postnasal drip and rhinorrhea.    Eyes: Negative.  Negative for visual disturbance.   Respiratory:  Negative for cough, shortness of breath and wheezing.    Cardiovascular: Negative.  Negative for chest pain, palpitations and leg swelling.   Gastrointestinal:  Positive for abdominal pain, diarrhea and nausea. Negative for abdominal distention, constipation and vomiting.   Endocrine: Negative.    Genitourinary:  Negative for dysuria and urgency.   Musculoskeletal: Negative.  Negative for back pain.   Skin: Negative.  Negative for rash.   Allergic/Immunologic: Negative for immunocompromised state.   Neurological: Negative.  Negative for dizziness, weakness, light-headedness and headaches.   Psychiatric/Behavioral: Negative.  Negative for agitation.        Objective   Physical Exam  Constitutional:       General: She is not in acute distress.  HENT:      Head: Normocephalic.      Nose: Nose normal.      Mouth/Throat:      Mouth: Mucous membranes are moist.   Eyes:      Conjunctiva/sclera: Conjunctivae normal.      Pupils: Pupils are equal, round, and reactive to light.   Cardiovascular:      Rate and Rhythm: Normal rate and regular rhythm.      Pulses: Normal pulses.      Heart sounds: Normal heart sounds.   Pulmonary:      Effort: No respiratory distress.      Breath sounds: No  wheezing.   Chest:      Chest wall: No tenderness.   Abdominal:      General: Abdomen is flat. Bowel sounds are normal.      Palpations: Abdomen is soft.      Tenderness: There is abdominal tenderness.      Comments: MILD EPIGASTRIC TENDERNESS     Musculoskeletal:         General: No tenderness. Normal range of motion.      Cervical back: Normal range of motion.   Lymphadenopathy:      Cervical: No cervical adenopathy.   Skin:     General: Skin is warm and dry.      Findings: No rash.   Neurological:      General: No focal deficit present.      Mental Status: She is alert. Mental status is at baseline.   Psychiatric:         Mood and Affect: Mood normal.         Behavior: Behavior normal.         Assessment/Plan   1. Black stool  Esophagogastroduodenoscopy (EGD)      2. Epigastric abdominal pain  Esophagogastroduodenoscopy (EGD)      3. History of weight loss surgery  Esophagogastroduodenoscopy (EGD)      4. Elevated vitamin B12 level        5. Iron deficiency  Vitamin B12    Vitamin B12      6. Other iron deficiency anemia  CBC and Auto Differential    Ferritin    Iron and TIBC    CBC and Auto Differential    Ferritin    Iron and TIBC      7. Abnormal liver enzymes  AST    ALT    AST    ALT      8. Diarrhea, unspecified type  metroNIDAZOLE (Flagyl) 500 mg tablet        TEST RESULTS WERE DISCUSSED.  ADVISED FOR SMALLER MEAL PORTIONS MORE FREQUENT SERVINGS AND TO HAVE MORE GATORADE ,AND TO HAVE LOW FAT DIET. CUT BACK ON CAFFEINE, TO AVOID TAKING NSAIDS.    ADVISED NOT TO overeat. Eat small portions at meals and snacks.  Avoid tight clothing and tight-fitting belts. Do not lie down or bend over within the first 15-30 minutes after eating.  Do not chew gum or suck on hard candy. Swallowing air with chewing gum and sucking on hard candy can cause belching and reflux.  Raise the head of your bed 6-8 inches.  Do not eat/drink: chocolate, tomatoes, tomato sauces, oranges, pineapple, grapefruit, mints, coffee, alcohol,  carbonated beverages, and black pepper.  Eat a low fat diet. Fatty and greasy foods cause your stomach to produce more acid.    MDM    1) COMPLEXITY: 1 UNDIAGNOSED NEW PROBLEM WITH UNCERTAIN PROGNOSIS  2)DATA: TESTS INTERPRETED AND OR ORDERED, TOOK INDEPENDENT HISTORY OR RECORDS REVIEWED  3)RISK: MODERATE RISK DUE TO NATURE OF MEDICAL CONDITIONS/COMORBIDITY OR MEDICATIONS ORDERED OR SURGICAL OR PROCEDURE REFERRAL, .       3 weeks

## 2025-06-18 ENCOUNTER — TELEPHONE (OUTPATIENT)
Dept: PRIMARY CARE | Facility: CLINIC | Age: 70
End: 2025-06-18
Payer: MEDICARE

## 2025-06-18 LAB
C COLI+JEJUNI+LARI FUSA STL QL NAA+PROBE: NOT DETECTED
C DIFF TOX GENS STL QL NAA+PROBE: NOT DETECTED
EC STX1 GENE STL QL NAA+PROBE: NOT DETECTED
EC STX2 GENE STL QL NAA+PROBE: NOT DETECTED
NOROV GI+II ORF1-ORF2 JNC STL QL NAA+PR: NOT DETECTED
RVA NSP5 STL QL NAA+PROBE: NOT DETECTED
SALMONELLA SP RPOD STL QL NAA+PROBE: NOT DETECTED
SHIGELLA DNA SPEC QL NAA+PROBE: NOT DETECTED
V CHOL+PARA RFBL+TRKH+TNAA STL QL NAA+PR: NOT DETECTED
Y ENTERO RECN STL QL NAA+PROBE: NOT DETECTED

## 2025-06-18 NOTE — TELEPHONE ENCOUNTER
Egd added to tomorrow per dr gentile to be done sooner and a lot of cancellations tomorrow.  Patient has medicare (red white and blue card)

## 2025-06-19 ENCOUNTER — ANESTHESIA EVENT (OUTPATIENT)
Dept: OPERATING ROOM | Facility: HOSPITAL | Age: 70
End: 2025-06-19
Payer: MEDICARE

## 2025-06-19 ENCOUNTER — HOSPITAL ENCOUNTER (OUTPATIENT)
Dept: OPERATING ROOM | Facility: HOSPITAL | Age: 70
Setting detail: OUTPATIENT SURGERY
Discharge: HOME | End: 2025-06-19
Payer: MEDICARE

## 2025-06-19 ENCOUNTER — ANESTHESIA (OUTPATIENT)
Dept: OPERATING ROOM | Facility: HOSPITAL | Age: 70
End: 2025-06-19
Payer: MEDICARE

## 2025-06-19 ENCOUNTER — TELEPHONE (OUTPATIENT)
Dept: GASTROENTEROLOGY | Facility: CLINIC | Age: 70
End: 2025-06-19

## 2025-06-19 VITALS
HEART RATE: 62 BPM | HEIGHT: 64 IN | TEMPERATURE: 97.3 F | WEIGHT: 155.8 LBS | RESPIRATION RATE: 16 BRPM | BODY MASS INDEX: 26.6 KG/M2 | OXYGEN SATURATION: 100 % | DIASTOLIC BLOOD PRESSURE: 82 MMHG | SYSTOLIC BLOOD PRESSURE: 128 MMHG

## 2025-06-19 DIAGNOSIS — R10.13 EPIGASTRIC ABDOMINAL PAIN: ICD-10-CM

## 2025-06-19 DIAGNOSIS — Z98.84 HISTORY OF WEIGHT LOSS SURGERY: ICD-10-CM

## 2025-06-19 DIAGNOSIS — K92.1 BLACK STOOL: ICD-10-CM

## 2025-06-19 PROCEDURE — 2500000004 HC RX 250 GENERAL PHARMACY W/ HCPCS (ALT 636 FOR OP/ED): Performed by: ANESTHESIOLOGY

## 2025-06-19 PROCEDURE — 7100000010 HC PHASE TWO TIME - EACH INCREMENTAL 1 MINUTE: Performed by: ANESTHESIOLOGY

## 2025-06-19 PROCEDURE — 43239 EGD BIOPSY SINGLE/MULTIPLE: CPT | Performed by: INTERNAL MEDICINE

## 2025-06-19 PROCEDURE — 7100000009 HC PHASE TWO TIME - INITIAL BASE CHARGE: Performed by: ANESTHESIOLOGY

## 2025-06-19 PROCEDURE — 3700000001 HC GENERAL ANESTHESIA TIME - INITIAL BASE CHARGE: Performed by: ANESTHESIOLOGY

## 2025-06-19 PROCEDURE — 3700000002 HC GENERAL ANESTHESIA TIME - EACH INCREMENTAL 1 MINUTE: Performed by: ANESTHESIOLOGY

## 2025-06-19 PROCEDURE — 2500000005 HC RX 250 GENERAL PHARMACY W/O HCPCS: Performed by: ANESTHESIOLOGY

## 2025-06-19 PROCEDURE — 3600000007 HC OR TIME - EACH INCREMENTAL 1 MINUTE - PROCEDURE LEVEL TWO: Performed by: ANESTHESIOLOGY

## 2025-06-19 PROCEDURE — 3600000002 HC OR TIME - INITIAL BASE CHARGE - PROCEDURE LEVEL TWO: Performed by: ANESTHESIOLOGY

## 2025-06-19 RX ORDER — TOPICAL ANESTHETIC 200 MG/ML
SPRAY DENTAL; PERIODONTAL AS NEEDED
Status: DISCONTINUED | OUTPATIENT
Start: 2025-06-19 | End: 2025-06-19

## 2025-06-19 RX ORDER — SODIUM CHLORIDE, SODIUM LACTATE, POTASSIUM CHLORIDE, CALCIUM CHLORIDE 600; 310; 30; 20 MG/100ML; MG/100ML; MG/100ML; MG/100ML
125 INJECTION, SOLUTION INTRAVENOUS CONTINUOUS
OUTPATIENT
Start: 2025-06-19 | End: 2025-06-20

## 2025-06-19 RX ORDER — ONDANSETRON HYDROCHLORIDE 2 MG/ML
4 INJECTION, SOLUTION INTRAVENOUS EVERY 12 HOURS PRN
OUTPATIENT
Start: 2025-06-19

## 2025-06-19 RX ORDER — LIDOCAINE HYDROCHLORIDE 10 MG/ML
INJECTION, SOLUTION EPIDURAL; INFILTRATION; INTRACAUDAL; PERINEURAL AS NEEDED
Status: DISCONTINUED | OUTPATIENT
Start: 2025-06-19 | End: 2025-06-19

## 2025-06-19 RX ORDER — SODIUM CHLORIDE, SODIUM LACTATE, POTASSIUM CHLORIDE, CALCIUM CHLORIDE 600; 310; 30; 20 MG/100ML; MG/100ML; MG/100ML; MG/100ML
20 INJECTION, SOLUTION INTRAVENOUS CONTINUOUS
Status: DISCONTINUED | OUTPATIENT
Start: 2025-06-19 | End: 2025-06-20 | Stop reason: HOSPADM

## 2025-06-19 RX ORDER — SODIUM CHLORIDE, SODIUM LACTATE, POTASSIUM CHLORIDE, CALCIUM CHLORIDE 600; 310; 30; 20 MG/100ML; MG/100ML; MG/100ML; MG/100ML
100 INJECTION, SOLUTION INTRAVENOUS CONTINUOUS
Status: DISCONTINUED | OUTPATIENT
Start: 2025-06-19 | End: 2025-06-20 | Stop reason: HOSPADM

## 2025-06-19 RX ORDER — PROPOFOL 10 MG/ML
INJECTION, EMULSION INTRAVENOUS AS NEEDED
Status: DISCONTINUED | OUTPATIENT
Start: 2025-06-19 | End: 2025-06-19

## 2025-06-19 RX ORDER — MIDAZOLAM HYDROCHLORIDE 1 MG/ML
2 INJECTION INTRAMUSCULAR; INTRAVENOUS ONCE AS NEEDED
Status: COMPLETED | OUTPATIENT
Start: 2025-06-19 | End: 2025-06-19

## 2025-06-19 RX ADMIN — PROPOFOL 40 MG: 10 INJECTION, EMULSION INTRAVENOUS at 10:58

## 2025-06-19 RX ADMIN — PROPOFOL 40 MG: 10 INJECTION, EMULSION INTRAVENOUS at 10:55

## 2025-06-19 RX ADMIN — PROPOFOL 30 MG: 10 INJECTION, EMULSION INTRAVENOUS at 11:01

## 2025-06-19 RX ADMIN — TOPICAL ANESTHETIC 2 SPRAY: 200 SPRAY DENTAL; PERIODONTAL at 10:50

## 2025-06-19 RX ADMIN — LIDOCAINE HYDROCHLORIDE 50 ML: 10 INJECTION, SOLUTION EPIDURAL; INFILTRATION; INTRACAUDAL; PERINEURAL at 10:49

## 2025-06-19 RX ADMIN — MIDAZOLAM HYDROCHLORIDE 2 MG: 1 INJECTION, SOLUTION INTRAMUSCULAR; INTRAVENOUS at 10:49

## 2025-06-19 RX ADMIN — PROPOFOL 60 MG: 10 INJECTION, EMULSION INTRAVENOUS at 10:53

## 2025-06-19 RX ADMIN — SODIUM CHLORIDE, SODIUM LACTATE, POTASSIUM CHLORIDE, AND CALCIUM CHLORIDE 100 ML/HR: .6; .31; .03; .02 INJECTION, SOLUTION INTRAVENOUS at 10:40

## 2025-06-19 SDOH — HEALTH STABILITY: MENTAL HEALTH: CURRENT SMOKER: 0

## 2025-06-19 ASSESSMENT — PAIN - FUNCTIONAL ASSESSMENT
PAIN_FUNCTIONAL_ASSESSMENT: 0-10

## 2025-06-19 ASSESSMENT — PAIN SCALES - GENERAL
PAINLEVEL_OUTOF10: 0 - NO PAIN
PAINLEVEL_OUTOF10: 1
PAINLEVEL_OUTOF10: 0 - NO PAIN
PAINLEVEL_OUTOF10: 0 - NO PAIN

## 2025-06-19 ASSESSMENT — PAIN DESCRIPTION - DESCRIPTORS: DESCRIPTORS: SORE

## 2025-06-19 NOTE — ANESTHESIA PREPROCEDURE EVALUATION
Patient: Yolette Morrow    Procedure Information       Date/Time: 06/19/25 1050    Scheduled providers: Brian Solitario MD    Procedure: EGD    Location: [facility] OR            Relevant Problems   Anesthesia (within normal limits)      Cardiac   (+) Hypercholesterolemia   (+) Hypertriglyceridemia   (-) AICD (automatic cardioverter/defibrillator) present   (-) Chest pain   (-) Congenital heart disease   (-) Dysrhythmias   (-) HTN (hypertension)   (-) History of coronary artery bypass graft   (-) Hx of heart transplant   (-) MI (myocardial infarction) (Multi)   (-) Pacemaker   (-) Presence of heart assist device   (-) Stented coronary artery      Pulmonary   (+) Asthma   (-) Chronic obstructive pulmonary disease (Multi)   (-) MAIKOL (obstructive sleep apnea)   (-) Recent URI      Neuro   (+) Other specified mononeuropathies of right lower limb      GI   (+) Chronic diarrhea   (+) GERD (gastroesophageal reflux disease)   (+) Hiatal hernia   (+) Irritable bowel syndrome with both constipation and diarrhea      /Renal   (-) Chronic renal insufficiency   (-) ESRD (end stage renal disease) (Multi)      Liver   (+) Fatty liver      Endocrine   (-) Diabetes mellitus type 1 (Multi)   (-) Diabetes mellitus, type 2 (Multi)   (-) Hyperthyroidism   (-) Hypothyroidism   (-) Obesity      Hematology   (+) Anemia   (-) Coagulopathy (Multi)      Musculoskeletal   (+) Chronic midline low back pain with bilateral sciatica   (+) Primary osteoarthritis of both ankles       Clinical information reviewed:   Tobacco  Allergies  Meds   Med Hx  Surg Hx  OB Status  Fam Hx  Soc   Hx        NPO Detail:  No data recorded     Physical Exam    Airway  Mallampati: II  TM distance: >3 FB  Mouth opening: 3 or more finger widths     Cardiovascular   Rhythm: regular  Rate: normal     Dental - normal exam     Pulmonary Breath sounds clear to auscultation     Abdominal            Anesthesia Plan    History of general  anesthesia?: yes  History of complications of general anesthesia?: no    ASA 2     MAC     The patient is not a current smoker.  Patient did not smoke on day of procedure.    intravenous induction   Anesthetic plan and risks discussed with patient.

## 2025-06-19 NOTE — DISCHARGE INSTRUCTIONS

## 2025-06-19 NOTE — H&P
Pre procedure H&P  Pt feels fine , no complaint  General appearance: Comfortable, no distress  ROS: No SOB  Medications reviewed  Head: Normal  Neck: Soft  Heart: Regular  Lungs: Clear  Abdomen: soft    Impression: clinically doing fine, proceed with procedure    Problem List Items Addressed This Visit    None  Visit Diagnoses         Black stool        Relevant Orders    Esophagogastroduodenoscopy (EGD)      Epigastric abdominal pain        Relevant Orders    Esophagogastroduodenoscopy (EGD)      History of weight loss surgery        Relevant Orders    Esophagogastroduodenoscopy (EGD)

## 2025-06-20 NOTE — ANESTHESIA POSTPROCEDURE EVALUATION
Patient: Yolette Morrow    Procedure Summary       Date: 06/19/25 Room / Location: [facility] OR    Anesthesia Start: 1045 Anesthesia Stop: 1111    Procedure: EGD Diagnosis:       Black stool      Epigastric abdominal pain      History of weight loss surgery    Scheduled Providers: Brian Solitario MD Responsible Provider: Zaid Juan MD PhD    Anesthesia Type: MAC ASA Status: 2            Anesthesia Type: MAC    Vitals Value Taken Time   /82 06/19/25 11:50   Temp 36.3 °C (97.3 °F) 06/19/25 11:10   Pulse 62 06/19/25 11:50   Resp 16 06/19/25 11:50   SpO2 100 % 06/19/25 11:50       Anesthesia Post Evaluation    Patient location during evaluation: PACU  Patient participation: complete - patient participated  Level of consciousness: awake and alert  Pain management: satisfactory to patient  Airway patency: patent  Cardiovascular status: hemodynamically stable  Respiratory status: spontaneous ventilation and room air  Hydration status: euvolemic  Postoperative Nausea and Vomiting: none        No notable events documented.

## 2025-06-26 LAB
LABORATORY COMMENT REPORT: NORMAL
PATH REPORT.COMMENTS IMP SPEC: NORMAL
PATH REPORT.FINAL DX SPEC: NORMAL
PATH REPORT.GROSS SPEC: NORMAL
PATH REPORT.RELEVANT HX SPEC: NORMAL
PATH REPORT.TOTAL CANCER: NORMAL

## 2025-07-05 DIAGNOSIS — R05.3 CHRONIC COUGH: ICD-10-CM

## 2025-07-08 ENCOUNTER — APPOINTMENT (OUTPATIENT)
Dept: PRIMARY CARE | Facility: CLINIC | Age: 70
End: 2025-07-08
Payer: MEDICARE

## 2025-07-08 RX ORDER — MONTELUKAST SODIUM 10 MG/1
10 TABLET ORAL NIGHTLY PRN
Qty: 90 TABLET | Refills: 3 | Status: SHIPPED | OUTPATIENT
Start: 2025-07-08 | End: 2026-01-04

## 2025-07-09 ENCOUNTER — TELEPHONE (OUTPATIENT)
Dept: PRIMARY CARE | Facility: CLINIC | Age: 70
End: 2025-07-09

## 2025-07-09 ENCOUNTER — APPOINTMENT (OUTPATIENT)
Dept: PRIMARY CARE | Facility: CLINIC | Age: 70
End: 2025-07-09
Payer: MEDICARE

## 2025-07-09 VITALS
DIASTOLIC BLOOD PRESSURE: 85 MMHG | SYSTOLIC BLOOD PRESSURE: 125 MMHG | OXYGEN SATURATION: 95 % | HEART RATE: 79 BPM | HEIGHT: 64 IN | BODY MASS INDEX: 26.72 KG/M2 | WEIGHT: 156.5 LBS

## 2025-07-09 DIAGNOSIS — K29.50 OTHER CHRONIC GASTRITIS WITHOUT HEMORRHAGE: ICD-10-CM

## 2025-07-09 DIAGNOSIS — R68.81 EARLY SATIETY: ICD-10-CM

## 2025-07-09 DIAGNOSIS — Z90.3 HISTORY OF SLEEVE GASTRECTOMY: ICD-10-CM

## 2025-07-09 DIAGNOSIS — K44.9: Primary | ICD-10-CM

## 2025-07-09 DIAGNOSIS — R14.0 ABDOMINAL BLOATING: ICD-10-CM

## 2025-07-09 DIAGNOSIS — R11.0 NAUSEA: ICD-10-CM

## 2025-07-09 DIAGNOSIS — K44.9 LARGE HIATAL HERNIA: ICD-10-CM

## 2025-07-09 PROCEDURE — 1036F TOBACCO NON-USER: CPT | Performed by: INTERNAL MEDICINE

## 2025-07-09 PROCEDURE — 1160F RVW MEDS BY RX/DR IN RCRD: CPT | Performed by: INTERNAL MEDICINE

## 2025-07-09 PROCEDURE — 99214 OFFICE O/P EST MOD 30 MIN: CPT | Performed by: INTERNAL MEDICINE

## 2025-07-09 PROCEDURE — 1159F MED LIST DOCD IN RCRD: CPT | Performed by: INTERNAL MEDICINE

## 2025-07-09 PROCEDURE — 3008F BODY MASS INDEX DOCD: CPT | Performed by: INTERNAL MEDICINE

## 2025-07-09 ASSESSMENT — ENCOUNTER SYMPTOMS
MUSCULOSKELETAL NEGATIVE: 1
WHEEZING: 0
GASTROINTESTINAL NEGATIVE: 1
AGITATION: 0
ABDOMINAL PAIN: 0
PALPITATIONS: 0
DYSURIA: 0
CHILLS: 0
NAUSEA: 0
PSYCHIATRIC NEGATIVE: 1
CARDIOVASCULAR NEGATIVE: 1
DIZZINESS: 0
NEUROLOGICAL NEGATIVE: 1
LIGHT-HEADEDNESS: 0
DIARRHEA: 0
FEVER: 0
SHORTNESS OF BREATH: 0
HEADACHES: 0
COUGH: 0
VOMITING: 0
BACK PAIN: 0
ENDOCRINE NEGATIVE: 1
WEAKNESS: 0
CONSTIPATION: 0
RHINORRHEA: 0
ABDOMINAL DISTENTION: 0
EYES NEGATIVE: 1

## 2025-07-09 ASSESSMENT — PATIENT HEALTH QUESTIONNAIRE - PHQ9
1. LITTLE INTEREST OR PLEASURE IN DOING THINGS: NOT AT ALL
2. FEELING DOWN, DEPRESSED OR HOPELESS: NOT AT ALL
SUM OF ALL RESPONSES TO PHQ9 QUESTIONS 1 AND 2: 0

## 2025-07-09 NOTE — PROGRESS NOTES
Subjective   Patient ID: Yolette Morrow is a 69 y.o. female who presents for Follow-up (3 month fu (blood work not done) ).  HPI  F/U ON EGD AND COLONOSCOPY AND STOOL TEST. LAB F/U WHICH WASN'T DONE .DIARRHEA AND BLOOD IN STOOL RESOLVED. HAS EARLY SATIETY WITH BLOATING , NAUSEA, FOOD INTOLERANCE, S/P GASTRIC SLEEVE SURGERY 17 YEARS AGO.  Review of Systems   Constitutional:  Negative for chills and fever.   HENT: Negative.  Negative for congestion, postnasal drip and rhinorrhea.    Eyes: Negative.  Negative for visual disturbance.   Respiratory:  Negative for cough, shortness of breath and wheezing.    Cardiovascular: Negative.  Negative for chest pain, palpitations and leg swelling.   Gastrointestinal: Negative.  Negative for abdominal distention, abdominal pain, constipation, diarrhea, nausea and vomiting.        BLOATING , EARLY SATIETY   Endocrine: Negative.    Genitourinary:  Negative for dysuria and urgency.   Musculoskeletal: Negative.  Negative for back pain.   Skin: Negative.  Negative for rash.   Allergic/Immunologic: Negative for immunocompromised state.   Neurological: Negative.  Negative for dizziness, weakness, light-headedness and headaches.   Psychiatric/Behavioral: Negative.  Negative for agitation.        Objective   Physical Exam  Constitutional:       General: She is not in acute distress.  HENT:      Head: Normocephalic.      Nose: Nose normal.      Mouth/Throat:      Mouth: Mucous membranes are moist.   Eyes:      Conjunctiva/sclera: Conjunctivae normal.      Pupils: Pupils are equal, round, and reactive to light.   Cardiovascular:      Rate and Rhythm: Normal rate and regular rhythm.      Pulses: Normal pulses.      Heart sounds: Normal heart sounds.   Pulmonary:      Effort: No respiratory distress.      Breath sounds: No wheezing.   Chest:      Chest wall: No tenderness.   Abdominal:      General: Abdomen is flat. Bowel sounds are normal.      Palpations: Abdomen is soft.      Tenderness:  There is no abdominal tenderness.   Musculoskeletal:         General: No tenderness. Normal range of motion.      Cervical back: Normal range of motion.   Lymphadenopathy:      Cervical: No cervical adenopathy.   Skin:     General: Skin is warm and dry.      Findings: No rash.   Neurological:      General: No focal deficit present.      Mental Status: She is alert. Mental status is at baseline.   Psychiatric:         Mood and Affect: Mood normal.         Behavior: Behavior normal.         Assessment/Plan   1. Thoracic stomach        2. Nausea  Referral to Primary Care    FL upper GI single contrast w small bowel follow through    Referral to General Surgery      3. Abdominal bloating  Referral to Primary Care    FL upper GI single contrast w small bowel follow through    Referral to General Surgery      4. Large hiatal hernia  FL upper GI single contrast w small bowel follow through    Referral to General Surgery      5. Early satiety  FL upper GI single contrast w small bowel follow through    Referral to General Surgery      6. History of sleeve gastrectomy  Referral to General Surgery      7. Other chronic gastritis without hemorrhage          TEST RESULTS WERE DISCUSSED.  EXPLAINED THE NEED FOR SURGICAL REPAIR AND THE PRESENCE OF THORACIC STOMACH. WILL DO GI SERIES.  ADVISED TO HAVE LOW FAT AND LOW CALORIE DIET ,DAILY EXERCISE, FALL PRECAUTION.  ADVISED TO CUT DOWN CAFFEINE.    MDM    1) COMPLEXITY: MORE THAN 1 STABLE CHRONIC CONDITION ADDRESSED  2)DATA: TESTS INTERPRETED AND OR ORDERED, TOOK INDEPENDENT HISTORY OR RECORDS REVIEWED  3)RISK: MODERATE RISK DUE TO NATURE OF MEDICAL CONDITIONS/COMORBIDITY OR MEDICATIONS ORDERED OR SURGICAL OR PROCEDURE REFERRAL, .       2 MON

## 2025-07-09 NOTE — TELEPHONE ENCOUNTER
I ORDERED LABS AT LAST VISIT WHICH WASN'T DONE. HER  ASKED ME IF IT'S NEEDED TO BE DONE FOR NEXT VISIT AND I SAID NO. PLEASE LET THEM KNOW AFTER REVEIWING THE ORDERS I RECOMMEND THE LABS TO BE DONE FOR NEXT VISIT .   none

## 2025-07-14 ENCOUNTER — HOSPITAL ENCOUNTER (OUTPATIENT)
Dept: RADIOLOGY | Facility: HOSPITAL | Age: 70
Discharge: HOME | End: 2025-07-14
Payer: MEDICARE

## 2025-07-14 DIAGNOSIS — K44.9 LARGE HIATAL HERNIA: ICD-10-CM

## 2025-07-14 DIAGNOSIS — R14.0 ABDOMINAL BLOATING: ICD-10-CM

## 2025-07-14 DIAGNOSIS — R68.81 EARLY SATIETY: ICD-10-CM

## 2025-07-14 DIAGNOSIS — R11.0 NAUSEA: ICD-10-CM

## 2025-07-14 PROCEDURE — 74248 X-RAY SM INT F-THRU STD: CPT

## 2025-07-14 PROCEDURE — 2500000005 HC RX 250 GENERAL PHARMACY W/O HCPCS: Performed by: INTERNAL MEDICINE

## 2025-07-14 PROCEDURE — A9698 NON-RAD CONTRAST MATERIALNOC: HCPCS | Performed by: INTERNAL MEDICINE

## 2025-07-14 RX ADMIN — BARIUM SULFATE 176 ML: 960 POWDER, FOR SUSPENSION ORAL at 11:14

## 2025-07-14 RX ADMIN — BARIUM SULFATE 340 G: 980 POWDER, FOR SUSPENSION ORAL at 11:04

## 2025-07-14 RX ADMIN — BARIUM SULFATE 176 ML: 960 POWDER, FOR SUSPENSION ORAL at 11:13

## 2025-07-15 ENCOUNTER — APPOINTMENT (OUTPATIENT)
Dept: PRIMARY CARE | Facility: CLINIC | Age: 70
End: 2025-07-15
Payer: MEDICARE

## 2025-08-05 ENCOUNTER — PATIENT MESSAGE (OUTPATIENT)
Dept: SURGERY | Facility: CLINIC | Age: 70
End: 2025-08-05
Payer: MEDICARE

## 2025-08-05 NOTE — TELEPHONE ENCOUNTER
Jose De La Vega, I have forwarded to your stated email the questionnaire for your scheduled appt with Dr. Singh on 8/11/25. Thanks and have a great day!  Regina Chavez LPN Clinic Nurse Dr. MADYSON [facility]

## 2025-08-11 ENCOUNTER — APPOINTMENT (OUTPATIENT)
Dept: SURGERY | Facility: CLINIC | Age: 70
End: 2025-08-11
Payer: MEDICARE

## 2025-08-11 VITALS
WEIGHT: 147 LBS | HEIGHT: 64 IN | HEART RATE: 79 BPM | DIASTOLIC BLOOD PRESSURE: 86 MMHG | SYSTOLIC BLOOD PRESSURE: 131 MMHG | BODY MASS INDEX: 25.1 KG/M2

## 2025-08-11 DIAGNOSIS — K44.9 HIATAL HERNIA: ICD-10-CM

## 2025-08-11 DIAGNOSIS — K21.9 GASTROESOPHAGEAL REFLUX DISEASE WITHOUT ESOPHAGITIS: Primary | ICD-10-CM

## 2025-08-11 DIAGNOSIS — K92.1 BLACK STOOL: ICD-10-CM

## 2025-08-11 PROCEDURE — 3008F BODY MASS INDEX DOCD: CPT | Performed by: SURGERY

## 2025-08-11 PROCEDURE — 1159F MED LIST DOCD IN RCRD: CPT | Performed by: SURGERY

## 2025-08-11 PROCEDURE — 99205 OFFICE O/P NEW HI 60 MIN: CPT | Performed by: SURGERY

## 2025-08-28 ENCOUNTER — APPOINTMENT (OUTPATIENT)
Dept: GASTROENTEROLOGY | Facility: CLINIC | Age: 70
End: 2025-08-28
Payer: MEDICARE

## 2025-08-30 LAB
ALBUMIN SERPL-MCNC: 4.2 G/DL (ref 3.6–5.1)
ALP SERPL-CCNC: 63 U/L (ref 37–153)
ALT SERPL-CCNC: 37 U/L (ref 6–29)
ANION GAP SERPL CALCULATED.4IONS-SCNC: 9 MMOL/L (CALC) (ref 7–17)
AST SERPL-CCNC: 32 U/L (ref 10–35)
BASOPHILS # BLD AUTO: 50 CELLS/UL (ref 0–200)
BASOPHILS NFR BLD AUTO: 1 %
BILIRUB SERPL-MCNC: 0.6 MG/DL (ref 0.2–1.2)
BUN SERPL-MCNC: 14 MG/DL (ref 7–25)
CALCIUM SERPL-MCNC: 9.3 MG/DL (ref 8.6–10.4)
CHLORIDE SERPL-SCNC: 102 MMOL/L (ref 98–110)
CHOLEST SERPL-MCNC: 119 MG/DL
CHOLEST/HDLC SERPL: 2.3 (CALC)
CO2 SERPL-SCNC: 29 MMOL/L (ref 20–32)
CREAT SERPL-MCNC: 0.72 MG/DL (ref 0.5–1.05)
EGFRCR SERPLBLD CKD-EPI 2021: 91 ML/MIN/1.73M2
EOSINOPHIL # BLD AUTO: 200 CELLS/UL (ref 15–500)
EOSINOPHIL NFR BLD AUTO: 4 %
ERYTHROCYTE [DISTWIDTH] IN BLOOD BY AUTOMATED COUNT: 12.9 % (ref 11–15)
EST. AVERAGE GLUCOSE BLD GHB EST-MCNC: 117 MG/DL
EST. AVERAGE GLUCOSE BLD GHB EST-SCNC: 6.5 MMOL/L
FERRITIN SERPL-MCNC: 145 NG/ML (ref 16–288)
GLUCOSE SERPL-MCNC: 91 MG/DL (ref 65–99)
HBA1C MFR BLD: 5.7 %
HCT VFR BLD AUTO: 43.8 % (ref 35–45)
HDLC SERPL-MCNC: 52 MG/DL
HGB BLD-MCNC: 13.8 G/DL (ref 11.7–15.5)
IRON SATN MFR SERPL: 41 % (CALC) (ref 16–45)
IRON SERPL-MCNC: 118 MCG/DL (ref 45–160)
LDLC SERPL CALC-MCNC: 48 MG/DL (CALC)
LYMPHOCYTES # BLD AUTO: 1790 CELLS/UL (ref 850–3900)
LYMPHOCYTES NFR BLD AUTO: 35.8 %
MCH RBC QN AUTO: 30.7 PG (ref 27–33)
MCHC RBC AUTO-ENTMCNC: 31.5 G/DL (ref 32–36)
MCV RBC AUTO: 97.6 FL (ref 80–100)
MONOCYTES # BLD AUTO: 490 CELLS/UL (ref 200–950)
MONOCYTES NFR BLD AUTO: 9.8 %
NEUTROPHILS # BLD AUTO: 2470 CELLS/UL (ref 1500–7800)
NEUTROPHILS NFR BLD AUTO: 49.4 %
NONHDLC SERPL-MCNC: 67 MG/DL (CALC)
PLATELET # BLD AUTO: 222 THOUSAND/UL (ref 140–400)
PMV BLD REES-ECKER: 10.3 FL (ref 7.5–12.5)
POTASSIUM SERPL-SCNC: 4.5 MMOL/L (ref 3.5–5.3)
PROT SERPL-MCNC: 6.3 G/DL (ref 6.1–8.1)
RBC # BLD AUTO: 4.49 MILLION/UL (ref 3.8–5.1)
SODIUM SERPL-SCNC: 140 MMOL/L (ref 135–146)
TIBC SERPL-MCNC: 286 MCG/DL (CALC) (ref 250–450)
TRIGL SERPL-MCNC: 103 MG/DL
WBC # BLD AUTO: 5 THOUSAND/UL (ref 3.8–10.8)

## 2025-09-04 ENCOUNTER — APPOINTMENT (OUTPATIENT)
Dept: PRIMARY CARE | Facility: CLINIC | Age: 70
End: 2025-09-04
Payer: MEDICARE

## 2025-09-09 ENCOUNTER — APPOINTMENT (OUTPATIENT)
Dept: PRIMARY CARE | Facility: CLINIC | Age: 70
End: 2025-09-09
Payer: MEDICARE

## 2025-10-13 ENCOUNTER — APPOINTMENT (OUTPATIENT)
Dept: SURGERY | Facility: CLINIC | Age: 70
End: 2025-10-13
Payer: MEDICARE

## 2025-12-08 ENCOUNTER — APPOINTMENT (OUTPATIENT)
Dept: SURGERY | Facility: CLINIC | Age: 70
End: 2025-12-08
Payer: MEDICARE

## 2026-02-16 ENCOUNTER — APPOINTMENT (OUTPATIENT)
Dept: PRIMARY CARE | Facility: CLINIC | Age: 71
End: 2026-02-16
Payer: MEDICARE